# Patient Record
Sex: FEMALE | Race: BLACK OR AFRICAN AMERICAN | NOT HISPANIC OR LATINO | Employment: UNEMPLOYED | URBAN - METROPOLITAN AREA
[De-identification: names, ages, dates, MRNs, and addresses within clinical notes are randomized per-mention and may not be internally consistent; named-entity substitution may affect disease eponyms.]

---

## 2017-01-03 ENCOUNTER — PATIENT OUTREACH (OUTPATIENT)
Dept: ADMINISTRATIVE | Facility: HOSPITAL | Age: 62
End: 2017-01-03

## 2017-01-24 ENCOUNTER — PATIENT OUTREACH (OUTPATIENT)
Dept: ADMINISTRATIVE | Facility: HOSPITAL | Age: 62
End: 2017-01-24

## 2017-01-25 ENCOUNTER — LAB VISIT (OUTPATIENT)
Dept: LAB | Facility: HOSPITAL | Age: 62
End: 2017-01-25
Attending: FAMILY MEDICINE
Payer: COMMERCIAL

## 2017-01-25 ENCOUNTER — OFFICE VISIT (OUTPATIENT)
Dept: FAMILY MEDICINE | Facility: CLINIC | Age: 62
End: 2017-01-25
Payer: COMMERCIAL

## 2017-01-25 VITALS
OXYGEN SATURATION: 97 % | HEART RATE: 55 BPM | HEIGHT: 65 IN | TEMPERATURE: 95 F | DIASTOLIC BLOOD PRESSURE: 90 MMHG | SYSTOLIC BLOOD PRESSURE: 170 MMHG | BODY MASS INDEX: 26.55 KG/M2 | WEIGHT: 159.38 LBS

## 2017-01-25 DIAGNOSIS — I25.10 CORONARY ARTERY DISEASE INVOLVING NATIVE CORONARY ARTERY OF NATIVE HEART WITHOUT ANGINA PECTORIS: ICD-10-CM

## 2017-01-25 DIAGNOSIS — Z23 NEED FOR PROPHYLACTIC VACCINATION OR INOCULATION AGAINST DIPHTHERIA AND TETANUS: ICD-10-CM

## 2017-01-25 DIAGNOSIS — Z00.00 ANNUAL PHYSICAL EXAM: ICD-10-CM

## 2017-01-25 DIAGNOSIS — I10 ESSENTIAL HYPERTENSION: ICD-10-CM

## 2017-01-25 DIAGNOSIS — G47.33 OSA (OBSTRUCTIVE SLEEP APNEA): ICD-10-CM

## 2017-01-25 DIAGNOSIS — Z78.0 POSTMENOPAUSAL: ICD-10-CM

## 2017-01-25 DIAGNOSIS — Z12.11 COLON CANCER SCREENING: ICD-10-CM

## 2017-01-25 DIAGNOSIS — E78.5 HYPERLIPIDEMIA, UNSPECIFIED HYPERLIPIDEMIA TYPE: ICD-10-CM

## 2017-01-25 DIAGNOSIS — E11.9 TYPE 2 DIABETES MELLITUS WITHOUT COMPLICATION: ICD-10-CM

## 2017-01-25 DIAGNOSIS — Z00.00 ANNUAL PHYSICAL EXAM: Primary | ICD-10-CM

## 2017-01-25 DIAGNOSIS — M54.2 NECK PAIN: ICD-10-CM

## 2017-01-25 DIAGNOSIS — E04.2 MULTINODULAR GOITER: ICD-10-CM

## 2017-01-25 DIAGNOSIS — F41.9 ANXIETY: ICD-10-CM

## 2017-01-25 DIAGNOSIS — Z12.31 OTHER SCREENING MAMMOGRAM: ICD-10-CM

## 2017-01-25 DIAGNOSIS — M47.812 CERVICAL ARTHRITIS: ICD-10-CM

## 2017-01-25 DIAGNOSIS — M54.12 CERVICAL RADICULOPATHY: ICD-10-CM

## 2017-01-25 LAB
ALBUMIN SERPL BCP-MCNC: 3.7 G/DL
ALP SERPL-CCNC: 71 U/L
ALT SERPL W/O P-5'-P-CCNC: 17 U/L
ANION GAP SERPL CALC-SCNC: 6 MMOL/L
AST SERPL-CCNC: 22 U/L
BASOPHILS # BLD AUTO: 0.01 K/UL
BASOPHILS NFR BLD: 0.2 %
BILIRUB SERPL-MCNC: 0.7 MG/DL
BUN SERPL-MCNC: 16 MG/DL
CALCIUM SERPL-MCNC: 10.4 MG/DL
CHLORIDE SERPL-SCNC: 105 MMOL/L
CHOLEST/HDLC SERPL: 2.9 {RATIO}
CHOLEST/HDLC SERPL: 2.9 {RATIO}
CO2 SERPL-SCNC: 29 MMOL/L
CREAT SERPL-MCNC: 1 MG/DL
CREAT UR-MCNC: 104 MG/DL
DIFFERENTIAL METHOD: ABNORMAL
EOSINOPHIL # BLD AUTO: 0.1 K/UL
EOSINOPHIL NFR BLD: 2.9 %
ERYTHROCYTE [DISTWIDTH] IN BLOOD BY AUTOMATED COUNT: 12.3 %
EST. GFR  (AFRICAN AMERICAN): >60 ML/MIN/1.73 M^2
EST. GFR  (NON AFRICAN AMERICAN): >60 ML/MIN/1.73 M^2
GLUCOSE SERPL-MCNC: 120 MG/DL
HCT VFR BLD AUTO: 36.6 %
HDL/CHOLESTEROL RATIO: 34.5 %
HDL/CHOLESTEROL RATIO: 34.5 %
HDLC SERPL-MCNC: 171 MG/DL
HDLC SERPL-MCNC: 171 MG/DL
HDLC SERPL-MCNC: 59 MG/DL
HDLC SERPL-MCNC: 59 MG/DL
HGB BLD-MCNC: 12.8 G/DL
LDLC SERPL CALC-MCNC: 100.8 MG/DL
LDLC SERPL CALC-MCNC: 100.8 MG/DL
LYMPHOCYTES # BLD AUTO: 2.1 K/UL
LYMPHOCYTES NFR BLD: 43 %
MCH RBC QN AUTO: 29.9 PG
MCHC RBC AUTO-ENTMCNC: 35 %
MCV RBC AUTO: 86 FL
MONOCYTES # BLD AUTO: 0.3 K/UL
MONOCYTES NFR BLD: 7.1 %
NEUTROPHILS # BLD AUTO: 2.2 K/UL
NEUTROPHILS NFR BLD: 46.6 %
NONHDLC SERPL-MCNC: 112 MG/DL
NONHDLC SERPL-MCNC: 112 MG/DL
PLATELET # BLD AUTO: 371 K/UL
PMV BLD AUTO: 11.4 FL
POTASSIUM SERPL-SCNC: 4.5 MMOL/L
PROT SERPL-MCNC: 7.5 G/DL
PROT UR-MCNC: 11 MG/DL
PROT/CREAT RATIO, UR: 0.11
RBC # BLD AUTO: 4.28 M/UL
SODIUM SERPL-SCNC: 140 MMOL/L
T4 FREE SERPL-MCNC: 1.08 NG/DL
TRIGL SERPL-MCNC: 56 MG/DL
TRIGL SERPL-MCNC: 56 MG/DL
TSH SERPL DL<=0.005 MIU/L-ACNC: 0.39 UIU/ML
WBC # BLD AUTO: 4.81 K/UL

## 2017-01-25 PROCEDURE — 99999 PR PBB SHADOW E&M-EST. PATIENT-LVL IV: CPT | Mod: PBBFAC,,, | Performed by: FAMILY MEDICINE

## 2017-01-25 PROCEDURE — 84156 ASSAY OF PROTEIN URINE: CPT

## 2017-01-25 PROCEDURE — 90715 TDAP VACCINE 7 YRS/> IM: CPT | Mod: S$GLB,,, | Performed by: FAMILY MEDICINE

## 2017-01-25 PROCEDURE — 84443 ASSAY THYROID STIM HORMONE: CPT

## 2017-01-25 PROCEDURE — 83036 HEMOGLOBIN GLYCOSYLATED A1C: CPT

## 2017-01-25 PROCEDURE — 3077F SYST BP >= 140 MM HG: CPT | Mod: S$GLB,,, | Performed by: FAMILY MEDICINE

## 2017-01-25 PROCEDURE — 3080F DIAST BP >= 90 MM HG: CPT | Mod: S$GLB,,, | Performed by: FAMILY MEDICINE

## 2017-01-25 PROCEDURE — 80053 COMPREHEN METABOLIC PANEL: CPT

## 2017-01-25 PROCEDURE — 80061 LIPID PANEL: CPT

## 2017-01-25 PROCEDURE — 84439 ASSAY OF FREE THYROXINE: CPT

## 2017-01-25 PROCEDURE — 36415 COLL VENOUS BLD VENIPUNCTURE: CPT | Mod: PO

## 2017-01-25 PROCEDURE — 85025 COMPLETE CBC W/AUTO DIFF WBC: CPT

## 2017-01-25 PROCEDURE — 99396 PREV VISIT EST AGE 40-64: CPT | Mod: 25,S$GLB,, | Performed by: FAMILY MEDICINE

## 2017-01-25 PROCEDURE — 90471 IMMUNIZATION ADMIN: CPT | Mod: S$GLB,,, | Performed by: FAMILY MEDICINE

## 2017-01-25 RX ORDER — METFORMIN HYDROCHLORIDE 1000 MG/1
1000 TABLET ORAL 2 TIMES DAILY WITH MEALS
Qty: 180 TABLET | Refills: 1 | Status: SHIPPED | OUTPATIENT
Start: 2017-01-25 | End: 2017-07-26

## 2017-01-25 RX ORDER — SERTRALINE HYDROCHLORIDE 50 MG/1
50 TABLET, FILM COATED ORAL DAILY
Qty: 90 TABLET | Refills: 1 | Status: SHIPPED | OUTPATIENT
Start: 2017-01-25 | End: 2019-09-17 | Stop reason: SDUPTHER

## 2017-01-25 RX ORDER — BENAZEPRIL HYDROCHLORIDE 40 MG/1
40 TABLET ORAL DAILY
COMMUNITY
End: 2017-07-26 | Stop reason: SDUPTHER

## 2017-01-25 RX ORDER — HYDROCORTISONE ACETATE 25 MG/1
25 SUPPOSITORY RECTAL 2 TIMES DAILY PRN
Qty: 24 SUPPOSITORY | Refills: 0 | Status: SHIPPED | OUTPATIENT
Start: 2017-01-25 | End: 2017-02-04

## 2017-01-25 RX ORDER — CYCLOBENZAPRINE HCL 10 MG
10 TABLET ORAL NIGHTLY
Qty: 30 TABLET | Refills: 2 | Status: SHIPPED | OUTPATIENT
Start: 2017-01-25 | End: 2020-12-18

## 2017-01-25 NOTE — MR AVS SNAPSHOT
[facility]  [address] [address]  Phone: [phone]                  Lydia Danielson   2017 9:00 AM   Office Visit    Description:  Female : 1955   Provider:  Chelsea Bowen MD   Department:  [facility]           Reason for Visit     Annual Exam           Diagnoses this Visit        Comments    Annual physical exam    -  Primary     Essential hypertension         Type II or unspecified type diabetes mellitus without mention of complication, uncontrolled         Hyperlipidemia, unspecified hyperlipidemia type         Multinodular goiter         Coronary artery disease involving native coronary artery of native heart without angina pectoris         NATASHA (obstructive sleep apnea)         Neck pain         Cervical arthritis         Cervical radiculopathy         Anxiety         Postmenopausal         Need for prophylactic vaccination or inoculation against diphtheria and tetanus         Colon cancer screening         Other screening mammogram                To Do List           Future Appointments        Provider Department Dept Phone    2017 10:40 AM LABORATORY, [address] [facility] [phone]    2017 8:45 AM [facility] MRI [facility] [phone]    2/10/2017 8:15 AM Jatin Montez MD [facility] - Cardiology [phone]    3/6/2017 9:30 AM [facility] [id number]-SCR [facility] [phone]    2017 10:45 AM Chelsea Bowen MD [facility] [phone]      Goals (5 Years of Data)     None      Follow-Up and Disposition     Return in about 3 months (around 2017) for diabetes and blood pressure follow up.       These Medications        Disp Refills Start End    cyclobenzaprine (FLEXERIL) 10 MG tablet 30 tablet 2 2017     Take 1 tablet (10 mg total) by mouth nightly. - Oral    Pharmacy: [id number] - [place], [place] - [address] AT [facility] Ph #: [phone]       sertraline (ZOLOFT) 50 MG tablet 90 tablet 1 1/25/2017 1/25/2018    Take 1 tablet (50 mg total) by mouth once daily. For anxiety - Oral    Pharmacy: [facility] [address] [place] - [address] AT [facility] Ph #: [phone]       metformin (GLUCOPHAGE) 1000 MG tablet 180 tablet 1 1/25/2017     Take 1 tablet (1,000 mg total) by mouth 2 (two) times daily with meals. - Oral    Pharmacy: [facility] [address] [place] - [address] [facility] Ph #: [phone]       hydrocortisone (ANUSOL-HC) 25 mg suppository 24 suppository 0 1/25/2017 2/4/2017    Place 1 suppository (25 mg total) rectally 2 (two) times daily as needed for Hemorrhoids. - Rectal    Pharmacy: [facility] [address] [place] - [address]  AT [facility] Ph #: [phone]         [facility]     [facility] On Call Nurse Care Line - 24/7 Assistance  Registered nurses in the [facility] provide clinical advisement, health education, appointment booking, and other advisory services.  Call for this free service at [phone].             Medications           Message regarding Medications     Verify the changes and/or additions to your medication regime listed below are the same as discussed with your clinician today.  If any of these changes or additions are incorrect, please notify your healthcare provider.        START taking these NEW medications        Refills    sertraline (ZOLOFT) 50 MG tablet 1    Sig: Take 1 tablet (50 mg total) by mouth once daily. For anxiety    Class: Normal    Route: Oral    hydrocortisone (ANUSOL-HC) 25 mg suppository 0    Sig: Place 1 suppository (25 mg total) rectally 2 (two) times daily as needed for Hemorrhoids.    Class: Normal    Route: Rectal      CHANGE how you are taking these medications     Start Taking Instead of    metformin (GLUCOPHAGE) 1000 MG tablet metformin  "(GLUCOPHAGE) 1000 MG tablet    Dosage:  Take 1 tablet (1,000 mg total) by mouth 2 (two) times daily with meals. Dosage:  TAKE 1 TABLET BY MOUTH TWICE DAILY WITH MEALS    Reason for Change:  Reorder       STOP taking these medications     lisinopril (PRINIVIL,ZESTRIL) 20 MG tablet Take 20 mg by mouth 2 (two) times daily.           Verify that the below list of medications is an accurate representation of the medications you are currently taking.  If none reported, the list may be blank. If incorrect, please contact your healthcare provider. Carry this list with you in case of emergency.           Current Medications     amlodipine (NORVASC) 10 MG tablet Take 1 tablet (10 mg total) by mouth once daily.    atorvastatin (LIPITOR) 40 MG tablet TAKE 1 TABLET BY MOUTH EVERY NIGHT AT BEDTIME    benazepril (LOTENSIN) 40 MG tablet Take 40 mg by mouth once daily.    cyclobenzaprine (FLEXERIL) 10 MG tablet Take 1 tablet (10 mg total) by mouth nightly.    fluticasone (FLONASE) 50 mcg/actuation nasal spray 2 Spray, Suspension Nasal Every day.  each nostril    furosemide (LASIX) 40 MG tablet TAKE 1 TABLET BY MOUTH EVERY DAY    glimepiride (AMARYL) 4 MG tablet TAKE 1 TABLET BY MOUTH TWICE DAILY    ibuprofen (ADVIL,MOTRIN) 800 MG tablet Take by mouth. 1 Tablet Oral Twice a day .  with food    insulin glargine (TOUJEO SOLOSTAR) 300 unit/mL (1.5 mL) InPn Inject 10 Units into the skin every evening.    insulin needles, disposable, 32 x 5/32 " Ndle Use daily with Lantus    metoprolol succinate (TOPROL-XL) 25 MG 24 hr tablet Take 25 mg by mouth once daily.    saxagliptin (ONGLYZA) 5 mg Tab tablet Take by mouth. 1 Tablet Oral Every day    WELCHOL 3.75 gram PwPk TAKE 1 PACKET BY MOUTH EVERY DAY AS DIRECTED    hydrocortisone (ANUSOL-HC) 25 mg suppository Place 1 suppository (25 mg total) rectally 2 (two) times daily as needed for Hemorrhoids.    metformin (GLUCOPHAGE) 1000 MG tablet Take 1 tablet (1,000 mg total) by mouth 2 (two) times " "daily with meals.    sertraline (ZOLOFT) 50 MG tablet Take 1 tablet (50 mg total) by mouth once daily. For anxiety           Clinical Reference Information           Vital Signs - Last Recorded  Most recent update: 1/25/2017  9:12 AM by Geovany Bacon LPN    BP Pulse Temp Ht Wt SpO2    (!) 170/90 (!) 55 (!) 95.4 °F (35.2 °C) 5' 5" (1.651 m) 72.3 kg (159 lb 6.3 oz) 97%    BMI                26.52 kg/m2          Blood Pressure          Most Recent Value    BP  (!)  170/90      Allergies as of 1/25/2017     No Known Allergies      Immunizations Administered on Date of Encounter - 1/25/2017     Name Date Dose VIS Date Route    TDAP 1/25/2017 0.5 mL 2/24/2015 Intramuscular    influenza - Quadrivalent 1/25/2017 0.5 mL 8/7/2015 Intramuscular    influenza - Quadrivalent 1/25/2017 0.5 mL 8/7/2015 Intramuscular      Orders Placed During Today's Visit      Normal Orders This Visit    Ambulatory referral to Cardiology     Case request GI: COLONOSCOPY     Influenza - Quadrivalent     Influenza - Quadrivalent     Protein / creatinine ratio, urine     Tdap Vaccine (Adult)     Future Labs/Procedures Expected by Expires    CBC auto differential  1/25/2017 3/26/2018    Comprehensive metabolic panel  1/25/2017 3/26/2018    Hemoglobin A1c  1/25/2017 3/26/2018    Lipid panel  1/25/2017 3/26/2018    Mammo Digital Screening Bilat with CAD  1/25/2017 3/27/2018    MRI Cervical Spine W WO Cont  1/25/2017 1/25/2018    TSH  1/25/2017 3/26/2018      Instructions    Please call Dr. Bowen for your results.    Thanks.       "

## 2017-01-25 NOTE — PROGRESS NOTES
"HISTORY OF PRESENT ILLNESS: Ms. Danielson comes in today fasting and without taking medication and without acute problems for annual wellness examination.  She states she feels her blood pressure is elevated today as she does not drive and her transportation was changed at the last minute this morning.      END OF LIFE DECISION: She has no living will and does not desire life support.    Current Outpatient Prescriptions   Medication Sig    amlodipine (NORVASC) 10 MG tablet Take 1 tablet (10 mg total) by mouth once daily.    atorvastatin (LIPITOR) 40 MG tablet TAKE 1 TABLET BY MOUTH EVERY NIGHT AT BEDTIME    benazepril (LOTENSIN) 40 MG tablet Take 40 mg by mouth once daily.    cyclobenzaprine (FLEXERIL) 10 MG tablet Take 1 tablet (10 mg total) by mouth nightly.    fluticasone (FLONASE) 50 mcg/actuation nasal spray 2 Spray, Suspension Nasal Every day.  each nostril    furosemide (LASIX) 40 MG tablet TAKE 1 TABLET BY MOUTH EVERY DAY    glimepiride (AMARYL) 4 MG tablet TAKE 1 TABLET BY MOUTH TWICE DAILY    ibuprofen (ADVIL,MOTRIN) 800 MG tablet Take by mouth. 1 Tablet Oral Twice a day .  with food    insulin glargine (TOUJEO SOLOSTAR) 300 unit/mL (1.5 mL) InPn Inject 10 Units into the skin every evening.    insulin needles, disposable, 32 x 5/32 " Ndle Use daily with Lantus    metoprolol succinate (TOPROL-XL) 25 MG 24 hr tablet Take 25 mg by mouth once daily.    saxagliptin (ONGLYZA) 5 mg Tab tablet Take by mouth. 1 Tablet Oral Every day    WELCHOL 3.75 gram PwPk TAKE 1 PACKET BY MOUTH EVERY DAY AS DIRECTED    metformin (GLUCOPHAGE) 1000 MG tablet TAKE 1 TABLET BY MOUTH TWICE DAILY WITH MEALS (currently not taking as reports no refills for some time)       SCREENINGS:   Cholesterol: January 7, 2016.  FFS/Colonoscopy: September 26, 2006; repeat in 8 years. Poor prep on January 28, 2015; patient desires to proceed at this time.  Mammogram: March 2, 2016 - benign.   GYN Exam: January 7, 2016 - okay.   Dexa " Scan: August 30, 2013 - okay; repeat 5 - 7 years.  Eye Exam: Summer 2016 at [facility] per patient. She wears glasses.  Dental Exam: 2016 per patient. Due.  PPD: Negative in the past.  Immunizations: Td/Tdap - September 14, 2006. She desires.  Gardasil - N./A.  Zostavax - January 7, 2016.  Pneumovax - August 14, 2006.  Seasonal Flu - January 7, 2016. She desires.  Hepatitis B vaccines - in the past per patient.    ROS:  GENERAL: Denies fever, chills, fatigue or unusual weight change. Appetite normal. Weight 69.8 kg (153 lb 14.1 oz) at July 28, 2016 visit. Reports not exercising. Monitors diet.  SKIN: Denies rashes, itching, changes in mole, color or texture of skin or easy bruising.  HEAD: Denies recent head trauma or recent headaches.  EYES: Denies change in vision, pain, diplopia, redness or discharge. Wears glasses.  EARS: Denies ear pain, discharge, vertigo or decreased hearing.  NOSE: Denies loss of smell, epistaxis or rhinitis.  MOUTH & THROAT: Denies hoarseness or change in voice. Denies excessive gum bleeding or mouth sores. Denies sore throat.  NODES: Denies swollen glands.  CHEST: Denies HOYT, wheezing, cough, or sputum production. Reports does uses CPAP prn. Scheduled to see Dr. Simon, pulmonologist, on February 5, 2015 for NATASHA surveillance. Reports does not desire to follow up at this time.  CARDIOVASCULAR: Denies chest pain, PND, orthopnea or reduced exercise tolerance. Denies palpitations. Saw Dr. Montez, cardiologist, on January 7, 2015 for CAD, hypertension surveillance.   ABDOMEN: Denies diarrhea, nausea, vomiting, or blood in stool except reports occasional abdomen pain, constipation and occasionally take Dulcolax with help.  Also, reports occasional painful hemorrhoids with straining.  URINARY: Denies flank pain, urine frequency, dysuria or hematuria.  GENITOURINARY: Denies flank pain, dysuria, vaginal discharge or hematuria. Performs monthly breast self-exams.  HEME/LYMPH: Denies bleeding  "problems.  ENDOCRINE:Reports checks glucose levels twice daily with levels ranging high lately 200's - 300's (rarely 100's).  Reports has not taken metformin in 6 to 7 months due to no refills.  Reports saw endocrinologist Dr. Harsh Almaraz Spring 2013 for evaluation of multinodular goiter, slight abnormal TSH level and reports likely would need "radiation pill" for treatment.  PERIPHERAL VASCULAR:Denies claudication or cyanosis.  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  Reports some times takes Advil with help. Requests refill of Flexeril.  NEUROLOGIC: Denies history of seizures, tremors, paralysis, alteration of gait or coordination. Reports occasional numbness at right hand.  PSYCHIATRIC: Denies mood swings, depression, homicidal or suicidal thoughts. Reports rare sleep problems (insomnia) and reports anxiety mostly every day.    PE:   VS:   Visit Vitals    BP (!) 170/90    Pulse (!) 55    Temp (!) 95.4 °F (35.2 °C)    Ht 5' 5" (1.651 m)    Wt 72.3 kg (159 lb 6.3 oz)    SpO2 97%    BMI 26.52 kg/m2     APPEARANCE: Well nourished, well developed female, pleasant and overweight, alert and oriented in no acute distress.   HEAD: Non tender. Full range of motion.  EYES: PERRL, conjunctiva pink, lids no edema. She wears glasses.  EARS: External canal patent, no swelling or redness. TM's shiny and clear.  NOSE: Mucosa and turbinates pink, not swollen. No discharge. Non tender sinuses.  THROAT: No pharyngeal erythema or exudate. No stridor.   NECK: Supple, no mass except chronic, slight non tender goiter noted.  NODES: No cervical, axillary lymph node enlargement.  CHEST: Normal respiratory effort. Lungs clear to auscultation.  CARDIOVASCULAR: Normal S1, S2. No rubs, murmurs or gallops. PMI not displaced. No carotid bruit. Pedal pulses palpable bilaterally. No edema. Feet " look okay without ulcerations or skin breaks.  ABDOMEN: Bowel sounds present. Not distended. Soft. No tenderness, masses or organomegaly.  BREAST EXAM: Symmetrical, no external lesions, no discharge, no masses palpated.  PELVIC EXAM: Not examined as patient has had IKE/BSO due to noncancerous reasons.  RECTAL EXAM: No external hemorrhoids or anal fissures; however, non-inflamed hemorrhoidal tag noted. Heme-negative stool in the rectal vault.  MUSCULOSKELETAL: No joint deformities or stiffness. Slightly tender posterior neck with full range of motion noted. She is ambulatory without problems.  SKIN: No rashes or suspicious lesions, normal color and turgor except benign-appearing skin tag mid back and keloids at anterior chest and abdomen and umbilicus.  NEUROLOGIC: Cranial Nerves: II-XII grossly intact. DTR's: Knees, Ankles 2+ and equal bilaterally. Monofilament test unremarkable. Gait & Posture: Normal gait and fine motion.  PSYCHIATRIC: Patient alert, oriented x 3. Mood/Affect normal without acute anxiety or depression noted. Judgment/insight good as she makes appropriate decisions on today's examination.    Protective Sensation (w/ 10 gram monofilament):  Right: Intact  Left: Intact    Visual Inspection:  Normal -  Bilateral    Pedal Pulses:   Right: Present  Left: Present    Posterior tibialis:   Right:Present  Left: Present    Lab Results   Component Value Date    HGBA1C 8.7 (H) 01/07/2016       ASSESSMENT:    ICD-10-CM ICD-9-CM    1. Annual physical exam Z00.00 V70.0 Comprehensive metabolic panel      Protein / creatinine ratio, urine      Lipid panel      CBC auto differential      TSH      Hemoglobin A1c   2. Essential hypertension I10 401.9    3. Type II or unspecified type diabetes mellitus without mention of complication, uncontrolled E11.65 250.02    4. Hyperlipidemia, unspecified hyperlipidemia type E78.5 272.4    5. Multinodular goiter E04.2 241.1    6. Coronary artery disease involving native coronary  artery of native heart without angina pectoris I25.10 414.01 Ambulatory referral to Cardiology   7. NATASHA (obstructive sleep apnea) G47.33 327.23    8. Neck pain M54.2 723.1 MRI Cervical Spine W WO Cont   9. Cervical arthritis M46.92 721.0 MRI Cervical Spine W WO Cont   10. Cervical radiculopathy M54.12 723.4 MRI Cervical Spine W WO Cont   11. Anxiety F41.9 300.00    12. Postmenopausal Z78.0 V49.81    13. Need for prophylactic vaccination or inoculation against diphtheria and tetanus Z23 V06.5 Tdap Vaccine (Adult)   14. Colon cancer screening Z12.11 V76.51 Case request GI: COLONOSCOPY   15. Other screening mammogram Z12.31 V76.12 Mammo Digital Screening Bilat with CAD        PLAN:  1. Age-appropriate counseling-appropriate low-sodium, low-cholesterol, low carbohydrate diet and exercise daily as tolerated, monthly breast self-exam, annual wellness examination.  2. Patient advised to call for results.  3. Encouraged patient to follow up with cardiology (appointment made) and endocrinology for surveillance of heart and thyroid disease, respectively. Patient states she will make her own appointment with endocrinology.  4. Continue current medications.  5. Annual eye and dental exams encouraged.  6. See me in 3 months for diabetes and hypertension follow up.  7. Prescription refill - Flexeril 10 mg nightly prn pain, #30, 2 refills; Metformin 1000 mg twice daily, #180, 1 refill.  8. Start Zoloft 50 mg daily for anxiety #90, 1 refill.  9.  Anusol-HC suppository - 1 per rectum twice daily prn, #24, 0 refill.  10. Flu shot today.

## 2017-01-26 LAB
ESTIMATED AVG GLUCOSE: 235 MG/DL
ESTIMATED AVG GLUCOSE: 235 MG/DL
HBA1C MFR BLD HPLC: 9.8 %
HBA1C MFR BLD HPLC: 9.8 %

## 2017-01-27 RX ORDER — SODIUM, POTASSIUM,MAG SULFATES 17.5-3.13G
SOLUTION, RECONSTITUTED, ORAL ORAL
Qty: 354 ML | Refills: 0 | Status: SHIPPED | OUTPATIENT
Start: 2017-01-27 | End: 2017-03-17

## 2017-01-30 ENCOUNTER — TELEPHONE (OUTPATIENT)
Dept: FAMILY MEDICINE | Facility: CLINIC | Age: 62
End: 2017-01-30

## 2017-01-30 NOTE — TELEPHONE ENCOUNTER
----- Message from Lea Cole sent at 1/30/2017  8:40 AM CST -----  Contact: pt  Pt calling for her results of lab test done on 1/25,please...[phone] (owtw)

## 2017-02-03 ENCOUNTER — TELEPHONE (OUTPATIENT)
Dept: RADIOLOGY | Facility: HOSPITAL | Age: 62
End: 2017-02-03

## 2017-02-06 ENCOUNTER — TELEPHONE (OUTPATIENT)
Dept: FAMILY MEDICINE | Facility: CLINIC | Age: 62
End: 2017-02-06

## 2017-02-06 ENCOUNTER — HOSPITAL ENCOUNTER (OUTPATIENT)
Dept: RADIOLOGY | Facility: HOSPITAL | Age: 62
Discharge: HOME | End: 2017-02-06
Attending: FAMILY MEDICINE
Payer: COMMERCIAL

## 2017-02-06 DIAGNOSIS — M47.812 CERVICAL ARTHRITIS: ICD-10-CM

## 2017-02-06 DIAGNOSIS — E04.2 MULTINODULAR GOITER: Primary | Chronic | ICD-10-CM

## 2017-02-06 DIAGNOSIS — M54.12 CERVICAL RADICULOPATHY: ICD-10-CM

## 2017-02-06 DIAGNOSIS — M54.2 NECK PAIN: ICD-10-CM

## 2017-02-06 DIAGNOSIS — R79.89 ABNORMAL TSH: ICD-10-CM

## 2017-02-06 RX ORDER — DIAZEPAM 2 MG/1
2 TABLET ORAL ONCE AS NEEDED
Qty: 1 TABLET | Refills: 0 | Status: SHIPPED | OUTPATIENT
Start: 2017-02-06 | End: 2020-12-18

## 2017-02-06 NOTE — TELEPHONE ENCOUNTER
----- Message from Talita Amezquita sent at 2/6/2017  1:40 PM CST -----  Contact: Pt   Pt called and stated she needed to the nurse. She stated she needs a referral to the thyroid specialist. She can be reached at [phone].    Thanks,  Tf

## 2017-02-06 NOTE — TELEPHONE ENCOUNTER
----- Message from Anastasiya Holden sent at 2/6/2017  2:15 PM CST -----  Regarding: MRI patient  Patient is claustrophobic and wants a mild sedation. She is rescheduled for 2/15. Thanks.

## 2017-02-10 ENCOUNTER — OFFICE VISIT (OUTPATIENT)
Dept: CARDIOLOGY | Facility: CLINIC | Age: 62
End: 2017-02-10
Payer: COMMERCIAL

## 2017-02-10 ENCOUNTER — CLINICAL SUPPORT (OUTPATIENT)
Dept: CARDIOLOGY | Facility: CLINIC | Age: 62
End: 2017-02-10
Payer: COMMERCIAL

## 2017-02-10 VITALS
HEIGHT: 65 IN | WEIGHT: 152 LBS | BODY MASS INDEX: 25.33 KG/M2 | HEART RATE: 58 BPM | DIASTOLIC BLOOD PRESSURE: 80 MMHG | SYSTOLIC BLOOD PRESSURE: 194 MMHG

## 2017-02-10 DIAGNOSIS — G47.33 OSA (OBSTRUCTIVE SLEEP APNEA): ICD-10-CM

## 2017-02-10 DIAGNOSIS — E04.2 MULTINODULAR GOITER: Chronic | ICD-10-CM

## 2017-02-10 DIAGNOSIS — I25.10 CORONARY ARTERY DISEASE, ANGINA PRESENCE UNSPECIFIED, UNSPECIFIED VESSEL OR LESION TYPE, UNSPECIFIED WHETHER NATIVE OR TRANSPLANTED HEART: ICD-10-CM

## 2017-02-10 DIAGNOSIS — E78.5 HYPERLIPIDEMIA, UNSPECIFIED HYPERLIPIDEMIA TYPE: Chronic | ICD-10-CM

## 2017-02-10 DIAGNOSIS — I10 ESSENTIAL HYPERTENSION: Primary | Chronic | ICD-10-CM

## 2017-02-10 PROCEDURE — 4010F ACE/ARB THERAPY RXD/TAKEN: CPT | Mod: S$GLB,,, | Performed by: INTERNAL MEDICINE

## 2017-02-10 PROCEDURE — 2022F DILAT RTA XM EVC RTNOPTHY: CPT | Mod: S$GLB,,, | Performed by: INTERNAL MEDICINE

## 2017-02-10 PROCEDURE — 3077F SYST BP >= 140 MM HG: CPT | Mod: S$GLB,,, | Performed by: INTERNAL MEDICINE

## 2017-02-10 PROCEDURE — 93000 ELECTROCARDIOGRAM COMPLETE: CPT | Mod: S$GLB,,, | Performed by: INTERNAL MEDICINE

## 2017-02-10 PROCEDURE — 3079F DIAST BP 80-89 MM HG: CPT | Mod: S$GLB,,, | Performed by: INTERNAL MEDICINE

## 2017-02-10 PROCEDURE — 99213 OFFICE O/P EST LOW 20 MIN: CPT | Mod: S$GLB,,, | Performed by: INTERNAL MEDICINE

## 2017-02-10 PROCEDURE — 3046F HEMOGLOBIN A1C LEVEL >9.0%: CPT | Mod: S$GLB,,, | Performed by: INTERNAL MEDICINE

## 2017-02-10 PROCEDURE — 99999 PR PBB SHADOW E&M-EST. PATIENT-LVL III: CPT | Mod: PBBFAC,,, | Performed by: INTERNAL MEDICINE

## 2017-02-10 RX ORDER — HYDROCHLOROTHIAZIDE 25 MG/1
25 TABLET ORAL DAILY
Qty: 30 TABLET | Refills: 11 | Status: SHIPPED | OUTPATIENT
Start: 2017-02-10 | End: 2017-03-29 | Stop reason: ALTCHOICE

## 2017-02-10 NOTE — PROGRESS NOTES
Subjective:   Patient ID:  Lydia Danielson is a 61 y.o. female who presents for follow up of No chief complaint on file.      HPI  A 60 yo female with h/o htn diabetes  hlp is here for f/u. Her diabetes is not well controlled fluctuates a lot she claims compliance with salt.her blood pressure is always elevated at the doctor office she has it under control at home with her machine which ahs not been checked. She ahs no other complaints of ehadaches. Has no shortness of breath or chest pain. No legs welling no orthopnea has nocturia. No palpitation syncope near syncope.   Past Medical History   Diagnosis Date    Allergic rhinitis, cause unspecified     CAD (coronary artery disease)      S/P MI in 2002; followed by Dr. Blevins, cardiologist    Diabetes mellitus, type 2     External hemorrhoid     GERD (gastroesophageal reflux disease)     Hyperlipidemia     Hypertension     Keloid     Multinodular goiter     Neck arthritis 07/28/2016    NATASHA (obstructive sleep apnea)     Postmenopausal      No history of abnormal pap smear       Past Surgical History   Procedure Laterality Date    Left breast lumpectomy       NO cancer    Tlh/bso       Due to noncancerous reasons    Appendectomy  2011    Cardiac catheterization         Social History   Substance Use Topics    Smoking status: Former Smoker     Packs/day: 0.10     Years: 0.20     Types: Cigarettes     Quit date: 7/8/1993    Smokeless tobacco: Never Used    Alcohol use No       Family History   Problem Relation Age of Onset    Arthritis Mother     Lupus Sister     Diabetes Brother     Hypertension Brother     Diabetes Paternal Aunt     Cancer Cousin     Cancer Cousin     Heart disease Sister     Diabetes Sister     Hypertension Sister     Diabetes Sister     Hypertension Sister     Diabetes Brother     Hypertension Brother     Cancer Brother      Pancreatic and lung cancer (Smoker)    Hypertension Son     Stroke Neg Hx        Current  "Outpatient Prescriptions   Medication Sig    amlodipine (NORVASC) 10 MG tablet Take 1 tablet (10 mg total) by mouth once daily.    atorvastatin (LIPITOR) 40 MG tablet TAKE 1 TABLET BY MOUTH EVERY NIGHT AT BEDTIME    benazepril (LOTENSIN) 40 MG tablet Take 40 mg by mouth once daily.    cyclobenzaprine (FLEXERIL) 10 MG tablet Take 1 tablet (10 mg total) by mouth nightly.    diazePAM (VALIUM) 2 MG tablet Take 1 tablet (2 mg total) by mouth once as needed for Anxiety (30 minutes prior to procedure).    fluticasone (FLONASE) 50 mcg/actuation nasal spray 2 Spray, Suspension Nasal Every day.  each nostril    furosemide (LASIX) 40 MG tablet TAKE 1 TABLET BY MOUTH EVERY DAY    glimepiride (AMARYL) 4 MG tablet TAKE 1 TABLET BY MOUTH TWICE DAILY    ibuprofen (ADVIL,MOTRIN) 800 MG tablet Take by mouth. 1 Tablet Oral Twice a day .  with food    insulin glargine (TOUJEO SOLOSTAR) 300 unit/mL (1.5 mL) InPn Inject 10 Units into the skin every evening.    insulin needles, disposable, 32 x 5/32 " Ndle Use daily with Lantus    metformin (GLUCOPHAGE) 1000 MG tablet Take 1 tablet (1,000 mg total) by mouth 2 (two) times daily with meals.    metoprolol succinate (TOPROL-XL) 25 MG 24 hr tablet Take 25 mg by mouth once daily.    saxagliptin (ONGLYZA) 5 mg Tab tablet Take by mouth. 1 Tablet Oral Every day    sertraline (ZOLOFT) 50 MG tablet Take 1 tablet (50 mg total) by mouth once daily. For anxiety    sodium,potassium,mag sulfates (SUPREP BOWEL PREP KIT) 17.5-3.13-1.6 gram SolR As directed    WELCHOL 3.75 gram PwPk TAKE 1 PACKET BY MOUTH EVERY DAY AS DIRECTED     No current facility-administered medications for this visit.      Current Outpatient Prescriptions on File Prior to Visit   Medication Sig    amlodipine (NORVASC) 10 MG tablet Take 1 tablet (10 mg total) by mouth once daily.    atorvastatin (LIPITOR) 40 MG tablet TAKE 1 TABLET BY MOUTH EVERY NIGHT AT BEDTIME    benazepril (LOTENSIN) 40 MG tablet Take 40 mg by " "mouth once daily.    cyclobenzaprine (FLEXERIL) 10 MG tablet Take 1 tablet (10 mg total) by mouth nightly.    diazePAM (VALIUM) 2 MG tablet Take 1 tablet (2 mg total) by mouth once as needed for Anxiety (30 minutes prior to procedure).    fluticasone (FLONASE) 50 mcg/actuation nasal spray 2 Spray, Suspension Nasal Every day.  each nostril    furosemide (LASIX) 40 MG tablet TAKE 1 TABLET BY MOUTH EVERY DAY    glimepiride (AMARYL) 4 MG tablet TAKE 1 TABLET BY MOUTH TWICE DAILY    ibuprofen (ADVIL,MOTRIN) 800 MG tablet Take by mouth. 1 Tablet Oral Twice a day .  with food    insulin glargine (TOUJEO SOLOSTAR) 300 unit/mL (1.5 mL) InPn Inject 10 Units into the skin every evening.    insulin needles, disposable, 32 x 5/32 " Ndle Use daily with Lantus    metformin (GLUCOPHAGE) 1000 MG tablet Take 1 tablet (1,000 mg total) by mouth 2 (two) times daily with meals.    metoprolol succinate (TOPROL-XL) 25 MG 24 hr tablet Take 25 mg by mouth once daily.    saxagliptin (ONGLYZA) 5 mg Tab tablet Take by mouth. 1 Tablet Oral Every day    sertraline (ZOLOFT) 50 MG tablet Take 1 tablet (50 mg total) by mouth once daily. For anxiety    sodium,potassium,mag sulfates (SUPREP BOWEL PREP KIT) 17.5-3.13-1.6 gram SolR As directed    WELCHOL 3.75 gram PwPk TAKE 1 PACKET BY MOUTH EVERY DAY AS DIRECTED     No current facility-administered medications on file prior to visit.        Review of Systems   Constitution: Negative for diaphoresis, weakness, malaise/fatigue and weight gain.   HENT: Negative for headaches and hoarse voice.    Eyes: Negative for double vision and visual disturbance.   Cardiovascular: Negative for chest pain, claudication, cyanosis, dyspnea on exertion, irregular heartbeat, leg swelling, near-syncope, orthopnea, palpitations, paroxysmal nocturnal dyspnea and syncope.   Respiratory: Negative for cough, hemoptysis, shortness of breath and snoring.    Hematologic/Lymphatic: Negative for bleeding problem. Does " not bruise/bleed easily.   Skin: Negative for color change and poor wound healing.   Musculoskeletal: Negative for muscle cramps, muscle weakness and myalgias.   Gastrointestinal: Negative for bloating, abdominal pain, change in bowel habit, diarrhea, heartburn, hematemesis, hematochezia, melena and nausea.   Genitourinary: Positive for nocturia.   Neurological: Negative for excessive daytime sleepiness, dizziness, light-headedness, loss of balance and numbness.   Psychiatric/Behavioral: Negative for memory loss. The patient does not have insomnia.    Allergic/Immunologic: Negative for hives.       Objective:   Physical Exam   Constitutional: She is oriented to person, place, and time. She appears well-developed and well-nourished. She does not appear ill. No distress.   HENT:   Head: Normocephalic and atraumatic.   Eyes: EOM are normal. Pupils are equal, round, and reactive to light. No scleral icterus.   Neck: Normal range of motion. Neck supple. Normal carotid pulses, no hepatojugular reflux and no JVD present. Carotid bruit is not present. No tracheal deviation present. No thyromegaly present.   Cardiovascular: Normal rate, regular rhythm, normal heart sounds and normal pulses.  Exam reveals no gallop and no friction rub.    No murmur heard.  Pulmonary/Chest: Effort normal and breath sounds normal. No respiratory distress. She has no wheezes. She has no rhonchi. She has no rales. She exhibits no tenderness.   Abdominal: Soft. Normal appearance, normal aorta and bowel sounds are normal. She exhibits no abdominal bruit, no ascites and no pulsatile midline mass. There is no hepatomegaly. There is no tenderness.   Musculoskeletal: She exhibits no edema.        Right shoulder: She exhibits no deformity.   Neurological: She is alert and oriented to person, place, and time. She has normal strength. No cranial nerve deficit. Coordination normal.   Skin: Skin is warm and dry. No rash noted. She is not diaphoretic. No  "cyanosis or erythema. Nails show no clubbing.   Psychiatric: She has a normal mood and affect. Her speech is normal and behavior is normal.   Nursing note and vitals reviewed.    Vitals:    02/10/17 0813   BP: (!) 194/80   BP Location: Right arm   Patient Position: Sitting   BP Method: Manual   Pulse: (!) 58   Weight: 68.9 kg (152 lb)   Height: 5' 5" (1.651 m)     Lab Results   Component Value Date    CHOL 171 01/25/2017    CHOL 171 01/25/2017    CHOL 184 01/07/2016     Lab Results   Component Value Date    HDL 59 01/25/2017    HDL 59 01/25/2017    HDL 61 01/07/2016     Lab Results   Component Value Date    LDLCALC 100.8 01/25/2017    LDLCALC 100.8 01/25/2017    LDLCALC 110.0 01/07/2016     Lab Results   Component Value Date    TRIG 56 01/25/2017    TRIG 56 01/25/2017    TRIG 65 01/07/2016     Lab Results   Component Value Date    CHOLHDL 34.5 01/25/2017    CHOLHDL 34.5 01/25/2017    CHOLHDL 33.2 01/07/2016       Chemistry        Component Value Date/Time     01/25/2017 1035    K 4.5 01/25/2017 1035     01/25/2017 1035    CO2 29 01/25/2017 1035    BUN 16 01/25/2017 1035    CREATININE 1.0 01/25/2017 1035     (H) 01/25/2017 1035        Component Value Date/Time    CALCIUM 10.4 01/25/2017 1035    ALKPHOS 71 01/25/2017 1035    AST 22 01/25/2017 1035    ALT 17 01/25/2017 1035    BILITOT 0.7 01/25/2017 1035          Lab Results   Component Value Date    TSH 0.387 (L) 01/25/2017     No results found for: INR, PROTIME  Lab Results   Component Value Date    WBC 4.81 01/25/2017    HGB 12.8 01/25/2017    HCT 36.6 (L) 01/25/2017    MCV 86 01/25/2017     (H) 01/25/2017     BMP  Sodium   Date Value Ref Range Status   01/25/2017 140 136 - 145 mmol/L Final     Potassium   Date Value Ref Range Status   01/25/2017 4.5 3.5 - 5.1 mmol/L Final     Chloride   Date Value Ref Range Status   01/25/2017 105 95 - 110 mmol/L Final     CO2   Date Value Ref Range Status   01/25/2017 29 23 - 29 mmol/L Final     BUN, Bld "   Date Value Ref Range Status   01/25/2017 16 8 - 23 mg/dL Final     Creatinine   Date Value Ref Range Status   01/25/2017 1.0 0.5 - 1.4 mg/dL Final     Calcium   Date Value Ref Range Status   01/25/2017 10.4 8.7 - 10.5 mg/dL Final     Anion Gap   Date Value Ref Range Status   01/25/2017 6 (L) 8 - 16 mmol/L Final     eGFR if    Date Value Ref Range Status   01/25/2017 >60.0 >60 mL/min/1.73 m^2 Final     eGFR if non    Date Value Ref Range Status   01/25/2017 >60.0 >60 mL/min/1.73 m^2 Final     Comment:     Calculation used to obtain the estimated glomerular filtration  rate (eGFR) is the CKD-EPI equation. Since race is unknown   in our information system, the eGFR values for   -American and Non--American patients are given   for each creatinine result.       CrCl cannot be calculated (Patient has no serum creatinine result on file.).    Assessment:     1. Essential hypertension    2. Coronary artery disease, angina presence unspecified, unspecified vessel or lesion type, unspecified whether native or transplanted heart    3. NATASHA (obstructive sleep apnea)    4. Hyperlipidemia, unspecified hyperlipidemia type    5. Multinodular goiter    6. Type II or unspecified type diabetes mellitus without mention of complication, uncontrolled      Uncontrolled htn needs complaince get her machine checked and add diuretics.  Plan:   hctz 25 mg po daily.  Record bp for 2 weeks   F/u with machine and bp reading with mid level in 2 weeks.

## 2017-02-10 NOTE — LETTER
February 10, 2017      Chelsea Bowen MD  [address] [place] [address]           [facility]  [address] [place] [address]  Phone: [phone]  Fax: [phone]          Patient: Lydia Danielson   MR Number: [id number]   YOB: 1955   Date of Visit: 2/10/2017       Dear Dr. Chelsea Bowen:    Thank you for referring Lydia Danielson to me for evaluation. Attached you will find relevant portions of my assessment and plan of care.    If you have questions, please do not hesitate to call me. I look forward to following Lydia Danielson along with you.    Sincerely,    aJtin Montez MD    Enclosure  CC:  No Recipients    If you would like to receive this communication electronically, please contact [email or web address] or [phone] to request more information on [employer] Link access.    For providers and/or their staff who would like to refer a patient to [facility], please contact us through our one-stop-shop provider referral line, [facility], at [phone].    If you feel you have received this communication in error or would no longer like to receive these types of communications, please e-mail [email or web address]

## 2017-02-10 NOTE — MR AVS SNAPSHOT
[facility]  [address]  [facility] [address]  Phone: [phone]  Fax: [phone]                  Lydia Danielson   2/10/2017 8:15 AM   Office Visit    Description:  Female : 1955   Provider:  Jatin Montez MD   Department:  [facility]           Diagnoses this Visit        Comments    Essential hypertension    -  Primary     Coronary artery disease, angina presence unspecified, unspecified vessel or lesion type, unspecified whether native or transplanted heart         NATASHA (obstructive sleep apnea)         Hyperlipidemia, unspecified hyperlipidemia type         Multinodular goiter         Type II or unspecified type diabetes mellitus without mention of complication, uncontrolled                To Do List           Future Appointments        Provider Department Dept Phone    2/15/2017 1:15 PM [facility] MRI [facility] [phone]    3/6/2017 9:30 AM [facility] [id number] [facility] [phone]    2017 10:45 AM Chelsea Bowen MD [facility] [phone]      Your Future Surgeries/Procedures     Mar 01, 2017   Surgery with Chase Moffett III, MD   [facility])    [address]  [facility] [address] [phone]              Goals (5 Years of Data)     None      Follow-Up and Disposition     Return in about 2 weeks (around 2017).       These Medications        Disp Refills Start End    hydrochlorothiazide (HYDRODIURIL) 25 MG tablet 30 tablet 11 2/10/2017 2/10/2018    Take 1 tablet (25 mg total) by mouth once daily. - Oral    Pharmacy: [id number] - [place], [place] - [address] AT [facility] Ph #: [phone]         [facility]     [facility] Nurse Care Line -  Assistance  Registered nurses in the [facility] provide clinical advisement, health education, appointment booking, and other advisory  "services.  Call for this free service at [phone].             Medications           Message regarding Medications     Verify the changes and/or additions to your medication regime listed below are the same as discussed with your clinician today.  If any of these changes or additions are incorrect, please notify your healthcare provider.        START taking these NEW medications        Refills    hydrochlorothiazide (HYDRODIURIL) 25 MG tablet 11    Sig: Take 1 tablet (25 mg total) by mouth once daily.    Class: Normal    Route: Oral           Verify that the below list of medications is an accurate representation of the medications you are currently taking.  If none reported, the list may be blank. If incorrect, please contact your healthcare provider. Carry this list with you in case of emergency.           Current Medications     amlodipine (NORVASC) 10 MG tablet Take 1 tablet (10 mg total) by mouth once daily.    atorvastatin (LIPITOR) 40 MG tablet TAKE 1 TABLET BY MOUTH EVERY NIGHT AT BEDTIME    benazepril (LOTENSIN) 40 MG tablet Take 40 mg by mouth once daily.    cyclobenzaprine (FLEXERIL) 10 MG tablet Take 1 tablet (10 mg total) by mouth nightly.    diazePAM (VALIUM) 2 MG tablet Take 1 tablet (2 mg total) by mouth once as needed for Anxiety (30 minutes prior to procedure).    fluticasone (FLONASE) 50 mcg/actuation nasal spray 2 Spray, Suspension Nasal Every day.  each nostril    furosemide (LASIX) 40 MG tablet TAKE 1 TABLET BY MOUTH EVERY DAY    glimepiride (AMARYL) 4 MG tablet TAKE 1 TABLET BY MOUTH TWICE DAILY    ibuprofen (ADVIL,MOTRIN) 800 MG tablet Take by mouth. 1 Tablet Oral Twice a day .  with food    insulin glargine (TOUJEO SOLOSTAR) 300 unit/mL (1.5 mL) InPn Inject 10 Units into the skin every evening.    insulin needles, disposable, 32 x 5/32 " Ndle Use daily with Lantus    metformin (GLUCOPHAGE) 1000 MG tablet Take 1 tablet (1,000 mg total) by mouth 2 (two) times daily with meals.    " "metoprolol succinate (TOPROL-XL) 25 MG 24 hr tablet Take 25 mg by mouth once daily.    saxagliptin (ONGLYZA) 5 mg Tab tablet Take by mouth. 1 Tablet Oral Every day    sertraline (ZOLOFT) 50 MG tablet Take 1 tablet (50 mg total) by mouth once daily. For anxiety    sodium,potassium,mag sulfates (SUPREP BOWEL PREP KIT) 17.5-3.13-1.6 gram SolR As directed    WELCHOL 3.75 gram PwPk TAKE 1 PACKET BY MOUTH EVERY DAY AS DIRECTED    hydrochlorothiazide (HYDRODIURIL) 25 MG tablet Take 1 tablet (25 mg total) by mouth once daily.           Clinical Reference Information           Your Vitals Were     BP Pulse Height Weight BMI    194/80 (BP Location: Right arm, Patient Position: Sitting, BP Method: Manual) 58 5' 5" (1.651 m) 68.9 kg (152 lb) 25.29 kg/m2      Blood Pressure          Most Recent Value    Right Arm BP - Sitting  194/80    Left Arm BP - Sitting  190/88    BP  (!)  194/80      Allergies as of 2/10/2017     No Known Allergies      Immunizations Administered on Date of Encounter - 2/10/2017     None      Orders Placed During Today's Visit     Future Labs/Procedures Expected by Expires    Basic metabolic panel  2/24/2017 (Approximate) 2/10/2018    SCHEDULED EKG 12-LEAD (to Muse)  As directed 2/7/2018      Language Assistance Services     ATTENTION: Language assistance services are available, free of charge. Please call [phone].      ATENCIÓN: Si habla español, tiene a franks disposición servicios gratuitos de asistencia lingüística. Llame al [phone].     [facility] Ý: N?u b?n nói Ti?ng Vi?t, có các d?ch v? h? tr? ngôn ng? mi?n phí dành cho b?n. G?i s? [phone].         [facility] complies with applicable Federal civil rights laws and does not discriminate on the basis of race, color, national origin, age, disability, or sex.        "

## 2017-02-14 ENCOUNTER — TELEPHONE (OUTPATIENT)
Dept: RADIOLOGY | Facility: HOSPITAL | Age: 62
End: 2017-02-14

## 2017-02-15 ENCOUNTER — HOSPITAL ENCOUNTER (OUTPATIENT)
Dept: RADIOLOGY | Facility: HOSPITAL | Age: 62
Discharge: HOME | End: 2017-02-15
Attending: FAMILY MEDICINE
Payer: COMMERCIAL

## 2017-02-15 PROCEDURE — 72141 MRI NECK SPINE W/O DYE: CPT | Mod: TC,PO

## 2017-02-15 PROCEDURE — 72141 MRI NECK SPINE W/O DYE: CPT | Mod: 26,,, | Performed by: RADIOLOGY

## 2017-03-01 ENCOUNTER — SURGERY (OUTPATIENT)
Age: 62
End: 2017-03-01

## 2017-03-01 ENCOUNTER — HOSPITAL ENCOUNTER (OUTPATIENT)
Facility: HOSPITAL | Age: 62
Discharge: HOME | End: 2017-03-01
Attending: INTERNAL MEDICINE | Admitting: INTERNAL MEDICINE
Payer: COMMERCIAL

## 2017-03-01 ENCOUNTER — ANESTHESIA EVENT (OUTPATIENT)
Dept: ENDOSCOPY | Facility: HOSPITAL | Age: 62
End: 2017-03-01
Payer: COMMERCIAL

## 2017-03-01 ENCOUNTER — ANESTHESIA (OUTPATIENT)
Dept: ENDOSCOPY | Facility: HOSPITAL | Age: 62
End: 2017-03-01
Payer: COMMERCIAL

## 2017-03-01 VITALS
OXYGEN SATURATION: 99 % | SYSTOLIC BLOOD PRESSURE: 171 MMHG | HEART RATE: 66 BPM | TEMPERATURE: 97 F | DIASTOLIC BLOOD PRESSURE: 73 MMHG | BODY MASS INDEX: 25.33 KG/M2 | HEIGHT: 65 IN | WEIGHT: 152 LBS | RESPIRATION RATE: 18 BRPM

## 2017-03-01 DIAGNOSIS — E04.2 MULTINODULAR GOITER: Chronic | ICD-10-CM

## 2017-03-01 DIAGNOSIS — M47.812 CERVICAL ARTHRITIS: ICD-10-CM

## 2017-03-01 DIAGNOSIS — Z12.11 COLON CANCER SCREENING: Primary | ICD-10-CM

## 2017-03-01 DIAGNOSIS — Z78.0 ASYMPTOMATIC POSTMENOPAUSAL STATUS (AGE-RELATED) (NATURAL): Chronic | ICD-10-CM

## 2017-03-01 DIAGNOSIS — I10 ESSENTIAL HYPERTENSION: Chronic | ICD-10-CM

## 2017-03-01 DIAGNOSIS — G47.33 OSA (OBSTRUCTIVE SLEEP APNEA): ICD-10-CM

## 2017-03-01 DIAGNOSIS — G47.30 UNSPECIFIED SLEEP APNEA: ICD-10-CM

## 2017-03-01 DIAGNOSIS — M54.12 CERVICAL RADICULOPATHY: ICD-10-CM

## 2017-03-01 DIAGNOSIS — K57.30 DIVERTICULOSIS OF LARGE INTESTINE WITHOUT HEMORRHAGE: ICD-10-CM

## 2017-03-01 DIAGNOSIS — M54.2 NECK PAIN: ICD-10-CM

## 2017-03-01 LAB — POCT GLUCOSE: 192 MG/DL (ref 70–110)

## 2017-03-01 PROCEDURE — G0121 COLON CA SCRN NOT HI RSK IND: HCPCS | Mod: ,,, | Performed by: INTERNAL MEDICINE

## 2017-03-01 PROCEDURE — 37000009 HC ANESTHESIA EA ADD 15 MINS: Performed by: INTERNAL MEDICINE

## 2017-03-01 PROCEDURE — G0121 COLON CA SCRN NOT HI RSK IND: HCPCS | Performed by: INTERNAL MEDICINE

## 2017-03-01 PROCEDURE — 37000008 HC ANESTHESIA 1ST 15 MINUTES: Performed by: INTERNAL MEDICINE

## 2017-03-01 PROCEDURE — 25000003 PHARM REV CODE 250: Performed by: INTERNAL MEDICINE

## 2017-03-01 PROCEDURE — 82962 GLUCOSE BLOOD TEST: CPT | Performed by: INTERNAL MEDICINE

## 2017-03-01 PROCEDURE — 63600175 PHARM REV CODE 636 W HCPCS: Performed by: NURSE ANESTHETIST, CERTIFIED REGISTERED

## 2017-03-01 PROCEDURE — 25000003 PHARM REV CODE 250: Performed by: NURSE ANESTHETIST, CERTIFIED REGISTERED

## 2017-03-01 RX ORDER — LIDOCAINE HYDROCHLORIDE 20 MG/ML
INJECTION, SOLUTION EPIDURAL; INFILTRATION; INTRACAUDAL; PERINEURAL
Status: DISCONTINUED | OUTPATIENT
Start: 2017-03-01 | End: 2017-03-01

## 2017-03-01 RX ORDER — SODIUM CHLORIDE, SODIUM LACTATE, POTASSIUM CHLORIDE, CALCIUM CHLORIDE 600; 310; 30; 20 MG/100ML; MG/100ML; MG/100ML; MG/100ML
INJECTION, SOLUTION INTRAVENOUS CONTINUOUS
Status: DISCONTINUED | OUTPATIENT
Start: 2017-03-01 | End: 2017-03-01 | Stop reason: HOSPADM

## 2017-03-01 RX ORDER — PROPOFOL 10 MG/ML
VIAL (ML) INTRAVENOUS
Status: DISCONTINUED | OUTPATIENT
Start: 2017-03-01 | End: 2017-03-01

## 2017-03-01 RX ADMIN — LIDOCAINE HYDROCHLORIDE 40 MG: 20 INJECTION, SOLUTION EPIDURAL; INFILTRATION; INTRACAUDAL; PERINEURAL at 11:03

## 2017-03-01 RX ADMIN — PROPOFOL 50 MG: 10 INJECTION, EMULSION INTRAVENOUS at 11:03

## 2017-03-01 RX ADMIN — PROPOFOL 150 MG: 10 INJECTION, EMULSION INTRAVENOUS at 11:03

## 2017-03-01 NOTE — ANESTHESIA PREPROCEDURE EVALUATION
03/01/2017  Lydia Danielson is a 61 y.o., female.    [facility] Anesthesia Evaluation    I have reviewed the Patient Summary Reports.    I have reviewed the Nursing Notes.   I have reviewed the Medications.     Review of Systems  Anesthesia Hx:  No problems with previous Anesthesia  Denies Family Hx of Anesthesia complications.   Denies Personal Hx of Anesthesia complications.   Social:  Non-Smoker, No Alcohol Use    Cardiovascular:   Exercise tolerance: good Hypertension, well controlled CAD asymptomatic  hyperlipidemia    Pulmonary:   Sleep Apnea, CPAP    Renal/:  Renal/ Normal     Hepatic/GI:   Bowel Prep. GERD, well controlled Water last at 5 am   Neurological:   TIA, Neuromuscular Disease,    Endocrine:   Diabetes, poorly controlled, type 2 Hyperthyroidism        Physical Exam  General:  Well nourished    Airway/Jaw/Neck:  Airway Findings: Mouth Opening: Normal Tongue: Normal  General Airway Assessment: Adult  Mallampati: II  TM Distance: Normal, at least 6 cm       Chest/Lungs:  Chest/Lungs Findings: Clear to auscultation     Heart/Vascular:  Heart Findings: Rate: Normal  Rhythm: Regular Rhythm  Sounds: Normal             Anesthesia Plan  Type of Anesthesia, risks & benefits discussed:  Anesthesia Type:  MAC  Patient's Preference:   Intra-op Monitoring Plan:   Intra-op Monitoring Plan Comments:   Post Op Pain Control Plan:   Post Op Pain Control Plan Comments:   Induction:    Beta Blocker:  Patient is on a Beta-Blocker and has received one dose within the past 24 hours (No further documentation required).       Informed Consent: Patient understands risks and agrees with Anesthesia plan.  Questions answered. Anesthesia consent signed with patient.  ASA Score: 3     Day of Surgery Review of History & Physical: I have interviewed and examined the patient. I have reviewed the patient's H&P dated:  There are  no significant changes.          Ready For Surgery From Anesthesia Perspective.

## 2017-03-01 NOTE — TRANSFER OF CARE
"Anesthesia Transfer of Care Note    Patient: Lydia Danielson    Procedure(s) Performed: Procedure(s) (LRB):  COLONOSCOPY (N/A)    Patient location: PACU    Anesthesia Type: MAC    Transport from OR: Transported from OR on room air with adequate spontaneous ventilation    Post pain: adequate analgesia    Post assessment: no apparent anesthetic complications and tolerated procedure well    Post vital signs: stable    Level of consciousness: awake and responds to stimulation    Nausea/Vomiting: no nausea/vomiting    Complications: none          Last vitals:   Visit Vitals    BP (!) 150/94    Pulse (!) 58    Temp 36 °C (96.8 °F)    Resp 14    Ht 5' 5" (1.651 m)    Wt 68.9 kg (152 lb)    SpO2 (!) 94%    Breastfeeding No    BMI 25.29 kg/m2     "

## 2017-03-01 NOTE — DISCHARGE INSTRUCTIONS

## 2017-03-01 NOTE — ANESTHESIA RELEASE NOTE
"Anesthesia Release from PACU Note    Patient: Lydia Danielson    Procedure(s) Performed: Procedure(s) (LRB):  COLONOSCOPY (N/A)    Anesthesia type: MAC    Post pain: Adequate analgesia    Post assessment: no apparent anesthetic complications, tolerated procedure well and no evidence of recall    Last Vitals:   Visit Vitals    BP (!) 150/94    Pulse (!) 58    Temp 36 °C (96.8 °F)    Resp 14    Ht 5' 5" (1.651 m)    Wt 68.9 kg (152 lb)    SpO2 (!) 94%    Breastfeeding No    BMI 25.29 kg/m2       Post vital signs: stable    Level of consciousness: awake, alert  and oriented    Nausea/Vomiting: no nausea/no vomiting    Complications: none    Airway Patency: patent    Respiratory: unassisted, spontaneous ventilation, room air    Cardiovascular: stable and blood pressure at baseline    Hydration: euvolemic  "

## 2017-03-01 NOTE — ANESTHESIA POSTPROCEDURE EVALUATION
"Anesthesia Post Evaluation    Patient: Lydia Danielson    Procedure(s) Performed: Procedure(s) (LRB):  COLONOSCOPY (N/A)    Final Anesthesia Type: MAC  Patient location during evaluation: PACU  Patient participation: Yes- Able to Participate  Level of consciousness: awake and alert and oriented  Post-procedure vital signs: reviewed and stable  Pain management: adequate  Airway patency: patent  PONV status at discharge: No PONV  Anesthetic complications: no      Cardiovascular status: blood pressure returned to baseline, hemodynamically stable and stable  Respiratory status: unassisted, room air and spontaneous ventilation  Hydration status: euvolemic  Follow-up not needed.        Visit Vitals    BP (!) 150/94    Pulse (!) 58    Temp 36 °C (96.8 °F)    Resp 14    Ht 5' 5" (1.651 m)    Wt 68.9 kg (152 lb)    SpO2 (!) 94%    Breastfeeding No    BMI 25.29 kg/m2       Pain/Maida Score: Pain Assessment Performed: Yes (3/1/2017 12:06 PM)  Presence of Pain: non-verbal indicators absent (3/1/2017 12:06 PM)  Maida Score: 9 (3/1/2017 12:06 PM)      "

## 2017-03-01 NOTE — IP AVS SNAPSHOT
[address] [place] [address]           Patient Discharge Instructions     Our goal is to set you up for success. This packet includes information on your condition, medications, and your home care. It will help you to care for yourself so you don't get sicker and need to go back to the hospital.     Please ask your nurse if you have any questions.        There are many details to remember when preparing to leave the hospital. Here is what you will need to do:    1. Take your medicine. If you are prescribed medications, review your Medication List in the following pages. You may have new medications to  at the pharmacy and others that you'll need to stop taking. Review the instructions for how and when to take your medications. Talk with your doctor or nurses if you are unsure of what to do.     2. Go to your follow-up appointments. Specific follow-up information is listed in the following pages. Your may be contacted by a transition nurse or clinical provider about future appointments. Be sure we have all of the phone numbers to reach you, if needed. Please contact your provider's office if you are unable to make an appointment.     3. Watch for warning signs. Your doctor or nurse will give you detailed warning signs to watch for and when to call for assistance. These instructions may also include educational information about your condition. If you experience any of warning signs to your health, call your doctor.               ** Verify the list of medication(s) below is accurate and up to date. Carry this with you in case of emergency. If your medications have changed, please notify your healthcare provider.             Medication List      ASK your doctor about these medications        Additional Info                      amlodipine 10 MG tablet   Commonly known as:  NORVASC   Quantity:  90 tablet   Refills:  0   Dose:  10 mg   Comments:  **Patient  requests 90 days supply**    Instructions:  Take 1 tablet (10 mg total) by mouth once daily.     Begin Date    AM    Noon    PM    Bedtime       atorvastatin 40 MG tablet   Commonly known as:  LIPITOR   Quantity:  90 tablet   Refills:  1   Comments:  **Patient requests 90 days supply**    Instructions:  TAKE 1 TABLET BY MOUTH EVERY NIGHT AT BEDTIME     Begin Date    AM    Noon    PM    Bedtime       benazepril 40 MG tablet   Commonly known as:  LOTENSIN   Refills:  0   Dose:  40 mg    Instructions:  Take 40 mg by mouth once daily.     Begin Date    AM    Noon    PM    Bedtime       cyclobenzaprine 10 MG tablet   Commonly known as:  FLEXERIL   Quantity:  30 tablet   Refills:  2   Dose:  10 mg    Instructions:  Take 1 tablet (10 mg total) by mouth nightly.     Begin Date    AM    Noon    PM    Bedtime       diazePAM 2 MG tablet   Commonly known as:  VALIUM   Quantity:  1 tablet   Refills:  0   Dose:  2 mg    Instructions:  Take 1 tablet (2 mg total) by mouth once as needed for Anxiety (30 minutes prior to procedure).     Begin Date    AM    Noon    PM    Bedtime       FLONASE 50 mcg/actuation nasal spray   Refills:  0   Generic drug:  fluticasone    Instructions:  2 Spray, Suspension Nasal Every day.  each nostril     Begin Date    AM    Noon    PM    Bedtime       furosemide 40 MG tablet   Commonly known as:  LASIX   Quantity:  90 tablet   Refills:  1   Comments:  **Patient requests 90 days supply**    Instructions:  TAKE 1 TABLET BY MOUTH EVERY DAY     Begin Date    AM    Noon    PM    Bedtime       glimepiride 4 MG tablet   Commonly known as:  AMARYL   Quantity:  180 tablet   Refills:  1   Comments:  **Patient requests 90 days supply**    Instructions:  TAKE 1 TABLET BY MOUTH TWICE DAILY     Begin Date    AM    Noon    PM    Bedtime       hydrochlorothiazide 25 MG tablet   Commonly known as:  HYDRODIURIL   Quantity:  30 tablet   Refills:  11   Dose:  25 mg    Instructions:  Take 1 tablet (25 mg total) by mouth once  "daily.     Begin Date    AM    Noon    PM    Bedtime       ibuprofen 800 MG tablet   Commonly known as:  ADVIL,MOTRIN   Refills:  0    Instructions:  Take by mouth. 1 Tablet Oral Twice a day .  with food     Begin Date    AM    Noon    PM    Bedtime       insulin glargine (TOUJEO) 300 unit/mL (1.5 mL) Inpn pen   Commonly known as:  TOUJEO SOLOSTAR   Quantity:  1.5 mL   Refills:  5   Dose:  10 Units    Instructions:  Inject 10 Units into the skin every evening.     Begin Date    AM    Noon    PM    Bedtime       metformin 1000 MG tablet   Commonly known as:  GLUCOPHAGE   Quantity:  180 tablet   Refills:  1   Dose:  1000 mg    Instructions:  Take 1 tablet (1,000 mg total) by mouth 2 (two) times daily with meals.     Begin Date    AM    Noon    PM    Bedtime       metoprolol succinate 25 MG 24 hr tablet   Commonly known as:  TOPROL-XL   Refills:  0   Dose:  25 mg    Instructions:  Take 25 mg by mouth once daily.     Begin Date    AM    Noon    PM    Bedtime       ONGLYZA 5 mg Tab tablet   Refills:  0   Generic drug:  SAXagliptin    Instructions:  Take by mouth. 1 Tablet Oral Every day     Begin Date    AM    Noon    PM    Bedtime       pen needle, diabetic 32 gauge x 5/32" Ndle   Quantity:  100 each   Refills:  2    Instructions:  Use daily with Lantus     Begin Date    AM    Noon    PM    Bedtime       sertraline 50 MG tablet   Commonly known as:  ZOLOFT   Quantity:  90 tablet   Refills:  1   Dose:  50 mg    Instructions:  Take 1 tablet (50 mg total) by mouth once daily. For anxiety     Begin Date    AM    Noon    PM    Bedtime       sodium,potassium,mag sulfates 17.5-3.13-1.6 gram Solr   Commonly known as:  SUPREP BOWEL PREP KIT   Quantity:  354 mL   Refills:  0    Instructions:  As directed     Begin Date    AM    Noon    PM    Bedtime       WELCHOL 3.75 gram Pwpk   Quantity:  90 each   Refills:  1   Comments:  **Patient requests 90 days supply**   Generic drug:  colesevelam    Instructions:  TAKE 1 PACKET BY MOUTH " EVERY DAY AS DIRECTED     Begin Date    AM    Noon    PM    Bedtime                  Please bring to all follow up appointments:    1. A copy of your discharge instructions.  2. All medicines you are currently taking in their original bottles.  3. Identification and insurance card.    Please arrive 15 minutes ahead of scheduled appointment time.    Please call 24 hours in advance if you must reschedule your appointment and/or time.        Your Scheduled Appointments     Mar 06, 2017  9:30 AM CST   Mammo Screening with [facility] [id number]   [facility] ([facility])    [address] [address]   [phone]            Mar 20, 2017 10:00 AM CDT   Established Patient Visit with JESSIE Whitaker   [facility] - Cardiology ([facility])    [address] [address]   [phone]            Apr 26, 2017 10:45 AM CDT   Established Patient Visit with Chelsea Bowen MD   [facility] ([facility])    [address] [address]   [phone]                  Discharge Instructions         Diverticulosis    Diverticulosis means that small pouches have formed in the wall of your large intestine (colon). Most often, this problem causes no symptoms and is common as people age. But the pouches in the colon are at risk of becoming infected. When this happens, the condition is called diverticulitis. Although most people with diverticulosis never develop diverticulitis, it is still not uncommon. Rectal bleeding can also occur and in less common situations, a type of colon inflammation called colitis.  While most people do not have symptoms, some people with diverticulosis may have:  · Abdominal cramps and pain  · Bloating  · Constipation  · Change in bowel habits  Causes  The exact cause of diverticulosis (and diverticulitis) has not been proved, but a few things are associated with the condition:  · Low-fiber diet  · Constipation  · Lack of  exercise  Your healthcare provider will talk with you about how to manage your condition. Diet changes may be all that are needed to help control diverticulosis and prevent progression to diverticulitis. If you develop diverticulitis, you will likely need other treatments.  Home care  You may be told to take fiber supplements daily. Fiber adds bulk to the stool so that it passes through the colon more easily. Stool softeners may be recommended. You may also be given medications for pain relief. Be sure to take all medications as directed.  In the past, people were told to avoid corn, nuts, and seeds. This is no longer necessary.  Follow these guidelines when caring for yourself at home:  · Eat unprocessed foods that are high in fiber. Whole grains, fruits, and vegetables are good choices.  · Drink 6 to 8 glasses of water every day unless your healthcare provider has you limit how much fluid you should have.  · Watch for changes in your bowel movements. Tell your provider if you notice any changes.  · Begin an exercise program. Ask your provider how to get started. Generally, walking is the best.  · Get plenty of rest and sleep.  Follow-up care  Follow up with your healthcare provider, or as advised. Regular visits may be needed to check on your health. Sometimes special procedures such as colonoscopy, are needed after an episode of diverticulitis or blooding. Be sure to keep all your appointments.  If a stool sample was taken, or cultures were done, you should be told if they are positive, or if your treatment needs to be changed. You can call as directed for the results.  If X-rays were done, a radiologist will look at them. You will be told if there is a change in your treatment.  If antibiotics were prescribed, be sure to finish them all.  When to seek medical advice  Call your healthcare provider right away if any of these occur:  · Fever of 100.4°F (38°C) or higher, or as directed by your healthcare  provider  · Severe cramps in the lower left side of the abdomen or pain that is getting worse  · Tenderness in the lower left side of the abdomen or worsening pain throughout the abdomen  · Diarrhea or constipation that doesn't get better within 24 hours  · Nausea and vomiting  · Bleeding from the rectum  Call 911  Call emergency services if any of the following occur:  · Trouble breathing  · Confusion  · Very drowsy or trouble awakening  · Fainting or loss of consciousness  · Rapid heart rate  · Chest pain  Date Last Reviewed: 12/30/2015  © 3464-3100 [employer]. [address] [place] [address]. All rights reserved. This information is not intended as a substitute for professional medical care. Always follow your healthcare professional's instructions.            Admission Information     Date & Time Provider Department CSN    3/1/2017  9:03 AM Chase Moffett III, MD [facility] -  [id number]      Care Providers     Provider Role Specialty Primary office phone    Chase Moffett III, MD Attending Provider Gastroenterology [phone]    Chase Moffett III, MD Surgeon  Gastroenterology [phone]      Your Vitals Were     BP                   150/94           Recent Lab Values        5/15/2012 10/15/2012 2/19/2013 8/30/2013 12/8/2014 3/5/2015 1/7/2016 1/25/2017     10:10 AM 10:53 AM 11:05 AM  7:17 AM 11:21 AM  3:05 PM 10:03 AM 10:35 AM    A1C 8.0 (H) 8.9 (H) 8.5 (H) 7.7 (H) 8.5 (H) 9.1 (H) 8.7 (H) 9.8 (H)            9.8 (H)    Comment for A1C at 10:35 AM on 1/25/2017:  According to ADA guidelines, hemoglobin A1C <7.0% represents  optimal control in non-pregnant diabetic patients.  Different  metrics may apply to specific populations.   Standards of Medical Care in Diabetes - 2016.  For the purpose of screening for the presence of diabetes:  <5.7%     Consistent with the absence of diabetes  5.7-6.4%  Consistent with increasing risk for diabetes   (prediabetes)  >or=6.5%   Consistent with diabetes  Currently no consensus exists for use of hemoglobin A1C  for diagnosis of diabetes for children.      Comment for A1C at 10:35 AM on 1/25/2017:  According to ADA guidelines, hemoglobin A1C <7.0% represents  optimal control in non-pregnant diabetic patients.  Different  metrics may apply to specific populations.   Standards of Medical Care in Diabetes - 2016.  For the purpose of screening for the presence of diabetes:  <5.7%     Consistent with the absence of diabetes  5.7-6.4%  Consistent with increasing risk for diabetes   (prediabetes)  >or=6.5%  Consistent with diabetes  Currently no consensus exists for use of hemoglobin A1C  for diagnosis of diabetes for children.        Allergies as of 3/1/2017     No Known Allergies      [facility]     [facility] Care Line - 24/7 Assistance  Unless otherwise directed by your provider, please contact [facility], our nurse care line that is available for 24/7 assistance.     Registered nurses in the [facility] provide clinical advisement, health education, appointment booking, and other advisory services.  Call for this free service at [phone].        Advance Directives     An advance directive is a document which, in the event you are no longer able to make decisions for yourself, tells your healthcare team what kind of treatment you do or do not want to receive, or who you would like to make those decisions for you.  If you do not currently have an advance directive, [facility] encourages you to create one.  For more information call:  [phone] ([phone]), [phone] ([phone]),  or log on to [email or web address].        Smoking Cessation     If you would like to quit smoking:   You may be eligible for free services if you are a [place] resident and started smoking cigarettes before September 1, 1988.  Call the Smoking Cessation [employer] (SCT) toll free at (251) 903-0308 or (807)  107-0058.   Call 1-800-QUIT-NOW if you do not meet the above criteria.            Language Assistance Services     ATTENTION: Language assistance services are available, free of charge. Please call [phone].      ATENCIÓN: Si habla cait, tiene a franks disposición servicios gratuitos de asistencia lingüística. Llame al [phone].     CHÚ Ý: N?u b?n nói Ti?ng Vi?t, có các d?ch v? h? tr? ngôn ng? mi?n phí dành cho b?n. G?i s? [phone].        Diabetes Discharge Instructions                                    [facility] -  complies with applicable Federal civil rights laws and does not discriminate on the basis of race, color, national origin, age, disability, or sex.

## 2017-03-01 NOTE — INTERVAL H&P NOTE
The patient has been examined and the H&P has been reviewed:I have reviewed this note and I agree with this assessment. The patient remains stable for endoscopy at the time of this present evaluation. GH        Anesthesia/Surgery risks, benefits and alternative options discussed and understood by patient/family.          Active Hospital Problems    Diagnosis  POA    Colon cancer screening [Z12.11]  Not Applicable      Resolved Hospital Problems    Diagnosis Date Resolved POA   No resolved problems to display.

## 2017-03-01 NOTE — H&P
Short Stay Endoscopy History and Physical    PCP - Chelsea Bowen MD    Procedure - Colonoscopy  ASA - 3  Mallampati - per anesthesia  History of Anesthesia problems - no  Family history Anesthesia problems -  no     HPI:  This is a 61 y.o.female here for evaluation of : Colon Cancer Screen    Reflux - no  Dysphagia - no  Abdominal pain - no  Diarrhea - no  Anemia - no  GI bleeding - no  Nausea and vomiting-no  Early satiety-no  aversion to sight or smell of food-no    ROS:  Constitutional: No fevers, chills, No weight loss  ENT: No allergies  CV: No chest pain  Pulm: No cough, No shortness of breath  Ophtho: No vision changes  GI: see HPI  Derm: No rash  Heme: No lymphadenopathy, No bruising  MSK: No arthritis  : No dysuria, No hematuria  Endo: No hot or cold intolerance  Neuro: No syncope, No seizure  Psych: No anxiety, No depression    Medical History:  Past Medical History:   Diagnosis Date    Allergic rhinitis, cause unspecified     CAD (coronary artery disease)     S/P MI in 2002; followed by Dr. Blevins, cardiologist    Diabetes mellitus, type 2     External hemorrhoid     GERD (gastroesophageal reflux disease)     Hyperlipidemia     Hypertension     Keloid     Multinodular goiter     Neck arthritis 07/28/2016    NATASHA (obstructive sleep apnea)     Postmenopausal     No history of abnormal pap smear       Surgical History:  Past Surgical History:   Procedure Laterality Date    APPENDECTOMY  2011    CARDIAC CATHETERIZATION      Left breast lumpectomy      NO cancer    TLH/BSO      Due to noncancerous reasons       Family History:  Family History   Problem Relation Age of Onset    Arthritis Mother     Lupus Sister     Diabetes Brother     Hypertension Brother     Diabetes Paternal Aunt     Cancer Cousin     Cancer Cousin     Heart disease Sister     Diabetes Sister     Hypertension Sister     Diabetes Sister     Hypertension Sister     Diabetes Brother     Hypertension Brother      "Cancer Brother      Pancreatic and lung cancer (Smoker)    Hypertension Son     Stroke Neg Hx        Social History:  Social History     Social History    Marital status:      Spouse name: N/A    Number of children: 1    Years of education: N/A     Occupational History    [employer]     [employer]     Social History Main Topics    Smoking status: Former Smoker     Packs/day: 0.10     Years: 0.20     Types: Cigarettes     Quit date: 7/8/1993    Smokeless tobacco: Never Used    Alcohol use No    Drug use: No    Sexual activity: Not Currently     Partners: Male     Birth control/ protection: Surgical, Post-menopausal     Other Topics Concern    Not on file     Social History Narrative    She wears seatbelt.       Allergies: Review of patient's allergies indicates:  No Known Allergies    Medications:   No current facility-administered medications on file prior to encounter.      Current Outpatient Prescriptions on File Prior to Encounter   Medication Sig Dispense Refill    amlodipine (NORVASC) 10 MG tablet Take 1 tablet (10 mg total) by mouth once daily. 90 tablet 0    atorvastatin (LIPITOR) 40 MG tablet TAKE 1 TABLET BY MOUTH EVERY NIGHT AT BEDTIME 90 tablet 1    benazepril (LOTENSIN) 40 MG tablet Take 40 mg by mouth once daily.      cyclobenzaprine (FLEXERIL) 10 MG tablet Take 1 tablet (10 mg total) by mouth nightly. 30 tablet 2    fluticasone (FLONASE) 50 mcg/actuation nasal spray 2 Spray, Suspension Nasal Every day.  each nostril      furosemide (LASIX) 40 MG tablet TAKE 1 TABLET BY MOUTH EVERY DAY 90 tablet 1    glimepiride (AMARYL) 4 MG tablet TAKE 1 TABLET BY MOUTH TWICE DAILY 180 tablet 1    ibuprofen (ADVIL,MOTRIN) 800 MG tablet Take by mouth. 1 Tablet Oral Twice a day .  with food      insulin glargine (TOUJEO SOLOSTAR) 300 unit/mL (1.5 mL) InPn Inject 10 Units into the skin every evening. 1.5 mL 5    insulin needles, disposable, 32 x 5/32 " Ndle Use " daily with Lantus 100 each 2    metformin (GLUCOPHAGE) 1000 MG tablet Take 1 tablet (1,000 mg total) by mouth 2 (two) times daily with meals. 180 tablet 1    metoprolol succinate (TOPROL-XL) 25 MG 24 hr tablet Take 25 mg by mouth once daily.      saxagliptin (ONGLYZA) 5 mg Tab tablet Take by mouth. 1 Tablet Oral Every day      sertraline (ZOLOFT) 50 MG tablet Take 1 tablet (50 mg total) by mouth once daily. For anxiety 90 tablet 1    WELCHOL 3.75 gram PwPk TAKE 1 PACKET BY MOUTH EVERY DAY AS DIRECTED 90 each 1       Objective Findings:    Vital Signs:There were no vitals filed for this visit.        Physical Exam:  General Appearance: Well appearing in no acute distress  Eyes:    No scleral icterus  ENT: Neck supple, Lips, mucosa, and tongue normal; teeth and gums normal  Lungs: CTA bilaterally in anterior and posterior fields, no wheezes, no crackles.  Heart:  Regular rate, S1, S2 normal, no murmurs heard.  Abdomen: Soft, non tender, non distended with normal bowel sounds. No hepatosplenomegaly, ascites, or mass.  Extremities: No clubbing, cyanosis or edema  Skin: No rash    Labs:  Reviewed    Plan:Colonoscopy  I have explained the risks and benefits of endoscopy procedures to the patient including but not limited to bleeding, perforation, infection, and death. The patient wishes to proceed.

## 2017-03-01 NOTE — DISCHARGE SUMMARY
[facility]  Brief Operative Note     SUMMARY     Surgery Date: 3/1/2017     Surgeon(s) and Role:     * Chase Moffett III, MD - Primary    Assisting Surgeon: None    Pre-op Diagnosis:  Colon cancer screening [Z12.11]    Post-op Diagnosis:  Post-Op Diagnosis Codes:     * Colon cancer screening [Z12.11]     - Diverticulosis  Procedure(s) (LRB):  COLONOSCOPY (N/A)    Anesthesia: Choice    Description of the findings of the procedure: Procedures completed. See Procedure note for full details.    Findings/Key Components: Procedures completed. See Procedure note for full details.    Prosthetics/Devices: None    Estimated Blood Loss: * No values recorded between 3/1/2017 12:00 AM and 3/1/2017 12:17 PM *         Specimens:   Specimen     None          Discharge Note    SUMMARY     Admit Date: 3/1/2017    Discharge Date and Time: 3/1/2017    Hospital Course (synopsis of major diagnoses, care, treatment, and services provided during the course of the hospital stay):  Procedures completed. See Procedure note for full details. Discharge patient when discharge criteria met.    Final Diagnosis: Post-Op Diagnosis Codes:     * Colon cancer screening [Z12.11]       Diverticulosis  Disposition: Discharge patient when discharge criteria met.    Follow Up/Patient Instructions:       Medications:  Reconciled Home Medications: Current Discharge Medication List      CONTINUE these medications which have NOT CHANGED    Details   amlodipine (NORVASC) 10 MG tablet Take 1 tablet (10 mg total) by mouth once daily.  Qty: 90 tablet, Refills: 0    Comments: **Patient requests 90 days supply**      atorvastatin (LIPITOR) 40 MG tablet TAKE 1 TABLET BY MOUTH EVERY NIGHT AT BEDTIME  Qty: 90 tablet, Refills: 1    Comments: **Patient requests 90 days supply**      benazepril (LOTENSIN) 40 MG tablet Take 40 mg by mouth once daily.      furosemide (LASIX) 40 MG tablet TAKE 1 TABLET BY MOUTH EVERY DAY  Qty: 90 tablet, Refills: 1     "Comments: **Patient requests 90 days supply**      glimepiride (AMARYL) 4 MG tablet TAKE 1 TABLET BY MOUTH TWICE DAILY  Qty: 180 tablet, Refills: 1    Comments: **Patient requests 90 days supply**      hydrochlorothiazide (HYDRODIURIL) 25 MG tablet Take 1 tablet (25 mg total) by mouth once daily.  Qty: 30 tablet, Refills: 11    Associated Diagnoses: Essential hypertension      ibuprofen (ADVIL,MOTRIN) 800 MG tablet Take by mouth. 1 Tablet Oral Twice a day .  with food      insulin glargine (TOUJEO SOLOSTAR) 300 unit/mL (1.5 mL) InPn Inject 10 Units into the skin every evening.  Qty: 1.5 mL, Refills: 5      insulin needles, disposable, 32 x 5/32 " Ndle Use daily with Lantus  Qty: 100 each, Refills: 2      metformin (GLUCOPHAGE) 1000 MG tablet Take 1 tablet (1,000 mg total) by mouth 2 (two) times daily with meals.  Qty: 180 tablet, Refills: 1      metoprolol succinate (TOPROL-XL) 25 MG 24 hr tablet Take 25 mg by mouth once daily.      WELCHOL 3.75 gram PwPk TAKE 1 PACKET BY MOUTH EVERY DAY AS DIRECTED  Qty: 90 each, Refills: 1    Comments: **Patient requests 90 days supply**      cyclobenzaprine (FLEXERIL) 10 MG tablet Take 1 tablet (10 mg total) by mouth nightly.  Qty: 30 tablet, Refills: 2      diazePAM (VALIUM) 2 MG tablet Take 1 tablet (2 mg total) by mouth once as needed for Anxiety (30 minutes prior to procedure).  Qty: 1 tablet, Refills: 0      fluticasone (FLONASE) 50 mcg/actuation nasal spray 2 Spray, Suspension Nasal Every day.  each nostril      saxagliptin (ONGLYZA) 5 mg Tab tablet Take by mouth. 1 Tablet Oral Every day      sertraline (ZOLOFT) 50 MG tablet Take 1 tablet (50 mg total) by mouth once daily. For anxiety  Qty: 90 tablet, Refills: 1      sodium,potassium,mag sulfates (SUPREP BOWEL PREP KIT) 17.5-3.13-1.6 gram SolR As directed  Qty: 354 mL, Refills: 0              Discharge Procedure Orders  Diet general     Activity as tolerated       "

## 2017-03-05 DIAGNOSIS — M50.30 DDD (DEGENERATIVE DISC DISEASE), CERVICAL: Primary | ICD-10-CM

## 2017-03-17 ENCOUNTER — INITIAL CONSULT (OUTPATIENT)
Dept: PHYSICAL MEDICINE AND REHAB | Facility: CLINIC | Age: 62
End: 2017-03-17
Payer: COMMERCIAL

## 2017-03-17 VITALS
HEIGHT: 65 IN | DIASTOLIC BLOOD PRESSURE: 86 MMHG | WEIGHT: 152 LBS | HEART RATE: 60 BPM | SYSTOLIC BLOOD PRESSURE: 170 MMHG | BODY MASS INDEX: 25.33 KG/M2

## 2017-03-17 DIAGNOSIS — R20.9 SKIN SENSATION DISTURBANCE: ICD-10-CM

## 2017-03-17 DIAGNOSIS — M79.18 MYOFACIAL MUSCLE PAIN: ICD-10-CM

## 2017-03-17 DIAGNOSIS — M67.911 DYSFUNCTION OF RIGHT ROTATOR CUFF: ICD-10-CM

## 2017-03-17 DIAGNOSIS — M47.812 DJD (DEGENERATIVE JOINT DISEASE), CERVICAL: ICD-10-CM

## 2017-03-17 DIAGNOSIS — M54.12 CERVICAL RADICULITIS: Primary | ICD-10-CM

## 2017-03-17 PROCEDURE — 1160F RVW MEDS BY RX/DR IN RCRD: CPT | Mod: S$GLB,,, | Performed by: PHYSICIAN ASSISTANT

## 2017-03-17 PROCEDURE — 99204 OFFICE O/P NEW MOD 45 MIN: CPT | Mod: S$GLB,,, | Performed by: PHYSICIAN ASSISTANT

## 2017-03-17 PROCEDURE — 3077F SYST BP >= 140 MM HG: CPT | Mod: S$GLB,,, | Performed by: PHYSICIAN ASSISTANT

## 2017-03-17 PROCEDURE — 99999 PR PBB SHADOW E&M-EST. PATIENT-LVL V: CPT | Mod: PBBFAC,,, | Performed by: PHYSICIAN ASSISTANT

## 2017-03-17 PROCEDURE — 3079F DIAST BP 80-89 MM HG: CPT | Mod: S$GLB,,, | Performed by: PHYSICIAN ASSISTANT

## 2017-03-17 RX ORDER — MELOXICAM 15 MG/1
15 TABLET ORAL DAILY
Qty: 30 TABLET | Refills: 1 | Status: SHIPPED | OUTPATIENT
Start: 2017-03-17 | End: 2019-12-09

## 2017-03-17 NOTE — LETTER
March 17, 2017      Chelsea Bowen MD  [address] [place] [address]           [facility] - Physiatry  [address] [facility] [address] [facility]  [place] [address]  Phone: [phone]  Fax: [phone]          Patient: Lydia Danielson   MR Number: [id number]   YOB: 1955   Date of Visit: 3/17/2017       Dear Dr. Chelsea Bowen:    Thank you for referring Lydia Danielson to me for evaluation. Attached you will find relevant portions of my assessment and plan of care.    If you have questions, please do not hesitate to call me. I look forward to following Lydia Danielson along with you.    Sincerely,    Brigido French PA-C    Enclosure  CC:  No Recipients    If you would like to receive this communication electronically, please contact [email or web address] or [phone] to request more information on [employer] Link access.    For providers and/or their staff who would like to refer a patient to [facility], please contact us through our one-stop-shop provider referral line, [facility] , at [phone].    If you feel you have received this communication in error or would no longer like to receive these types of communications, please e-mail [email or web address]

## 2017-03-17 NOTE — PROGRESS NOTES
Subjective:      Patient ID: Lydia Danielson is a 61 y.o. female.    Chief Complaint: Neck Pain    HPI Comments: Body part: Neck Pain    Occupation: [employer]    Dominant hand: Right    Referred by: Chelsea Bowen MD    Date of Injury: None    Patient's visit goal: To get some pain relief    Problem Description: Neck Pain since January 2017.  Complaining of popping in her neck. She has regular and intermittent R arm numbness- daily and not limited to sleep or activity.  SHe does lose some strength in the hand at times, but overall she remains fairly functional.  She complains of right shoulder discomfort.  Has been evaluated by her PCP and had an x-ray of the neck as well as MRI.  She denies any new bowel or bladder incontinence.  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  She is currently on a muscle relaxant and ibuprofen with some benefit, but her symptoms quickly returned.          Neck Pain    The current episode started more than 1 month ago (pain since January 2017). The problem occurs constantly. The problem has been gradually worsening. The pain is associated with nothing. The quality of the pain is described as aching. The pain is at a severity of 10/10. The pain is severe. Exacerbated by: activity. The pain is same all the time. Associated symptoms include chest pain. Pertinent negatives include no fever, numbness or weight loss. She has tried nothing for the symptoms. The treatment provided no relief.       Review of Systems   Constitution: Negative for chills, fever and weight loss.   HENT: Positive for ear pain and sore throat. Negative for congestion and hearing loss.    Eyes: Negative for double vision and pain.   Cardiovascular: Positive for chest pain. Negative for irregular heartbeat.   Respiratory: Positive for shortness of breath. Negative for  cough.    Endocrine: Negative for polyuria.   Hematologic/Lymphatic: Does not bruise/bleed easily.   Skin: Positive for itching. Negative for poor wound healing, rash and suspicious lesions.   Musculoskeletal: Positive for joint pain and neck pain. Negative for arthritis and joint swelling.   Gastrointestinal: Negative for abdominal pain, nausea and vomiting.   Genitourinary: Negative for bladder incontinence and frequency.   Neurological: Negative for loss of balance, numbness, paresthesias, sensory change and tremors.   Psychiatric/Behavioral: Negative for depression. The patient is not nervous/anxious.    Allergic/Immunologic: Negative for hives.         Objective:            General    Nursing note and vitals reviewed.  Constitutional: She is oriented to person, place, and time. She appears well-developed and well-nourished. No distress.   Neurological: She is alert and oriented to person, place, and time.   Psychiatric: She has a normal mood and affect. Her behavior is normal. Judgment and thought content normal.     General Musculoskeletal Exam   Gait: normal     Back (L-Spine & T-Spine) / Neck (C-Spine) Exam     Tenderness   The patient is tender to palpation of the right trapezial and left trapezial.     Back (L-Spine & T-Spine) Range of Motion   Extension: normal   Flexion: normal   Lateral Bend Right: normal   Lateral Bend Left: normal   Rotation Right: normal   Rotation Left: normal     Neck (C-Spine) Range of Motion   Flexion:     Mild and limited  Extension: Moderate and limited  Right Lateral Bend: abnormal  Left Lateral Bend: normal  Right Rotation: abnormal  Left Rotation: normal    Back (L-Spine & T-Spine) Tests   Right Side Tests  Femoral Stretch: negative  Left Side Tests  Femoral Stretch: negative    Neck (C-Spine) Tests   Spurling's Test   Left:  Negative  Right: negative    Comments:  Inspection: No deformity of spinal alignment.  She does stand with a mildly protracted and right shoulder  slouch.  Palpation: No vertebral tenderness to palpation.  The moderate amount supportive myofascial tenderness with radiation into the splenius.  Sensaton:tested to light touch - intact in all four limbs, but diminished in the right upper extremity diffusely suggesting possible combination of cervical and/or myofascial involvement  No focal atrophy is noted of either upper or lower extremity.  bilateral forearm tattoos      Neurological Exam:  Cranial Nerves: II-XII grossly intact  Left Hand/Wrist Exam     Tests   Phalens Sign: positive        Right Shoulder Exam     Inspection/Observation   Scars: absent  Deformity: absent    Tenderness   The patient is tender to palpation of the biceps tendon, acromioclavicular joint and greater tuberosity.    Range of Motion   Active Abduction: 100   External Rotation 0 degrees: normal   Internal Rotation 0 degrees: normal     Left Shoulder Exam     Inspection/Observation   Scars: absent  Deformity: absent    Tenderness   The patient is tender to palpation of the biceps tendon.    Range of Motion   Active Abduction: normal   External Rotation 0 degrees: normal   Internal Rotation 0 degrees: normal       Muscle Strength   Right Upper Extremity   Shoulder Abduction: 4/5   Shoulder Internal Rotation: 4/5   Shoulder External Rotation: 4/5   Biceps: /5   Deltoid:  4/5  Wrist Extension: 5/5 (-)/5   Wrist Flexion: /   : /5   Finger Flexors:  /5  Finger Extensors:  5/5  Left Upper Extremity  Shoulder Abduction:    Shoulder Internal Rotation: /5   Shoulder External Rotation: /   Biceps: /5   Deltoid:  5/5  Wrist Extension: /   Wrist Flexion: /   :  /   Finger Flexors:  5/5  Finger Extensors:  5/5  Right Lower Extremity   Hip Abduction: 5/   Hip Flexion: 5/   Quadriceps:  5/5   Hamstrin/   Gastrocsoleus:  /  EHL:  5/5  Left Lower Extremity   Hip Abduction: 5/5   Hip Flexion: /   Quadriceps:  /   Hamstrin/   Gastrocsoleus:   5/5/5  EHL:  5/5    Reflexes     Left Side  Biceps:  2+  Triceps:  2+  Brachioradialis:  2+  Achilles:  2+  Left Alatorre's Sign:  Absent  Ankle Clonus:  absent    Right Side   Biceps:  2+  Triceps:  2+  Brachioradialis:  2+  Achilles:  2+  Right Alatorre's Sign:  absent  Ankle Clonus:  absent    Vascular Exam     Right Pulses      Radial:                    2+      Left Pulses      Radial:                    2+      Capillary Refill  Right Hand: normal capillary refill  Left Hand: normal capillary refill      I have reviewed the films and report. I agree with the radiologist interpretation of the radiographic findings:  Findings: There is slight reversal of the cervical lordosis.  Mild degenerative vertebral endplate spurring with disc space narrowing and desiccation observed from C3-C4 through C5-C6.  Discogenic subendplate marrow signal changes present at the same levels.  Cervical cord and craniocervical junction are unremarkable.  Paravertebral soft tissues are symmetric and normal in appearance.  Left lobe of the thyroid gland is asymmetrically enlarged with several small cysts and equivocal isointense exophytic nodule in the posterior interpolar region.    C2-C3: Unremarkable.    C3-C4: Mild uncovertebral hypertrophy and left facet arthropathy.  Moderate left and mild right foraminal narrowing.  No canal stenosis.    C4-C5: Posterior disc osteophyte with slight right paracentral asymmetry and bilateral uncovertebral hypertrophy resulting in moderate bilateral foraminal narrowing.  No canal stenosis.    C5-C6: Posterior disc osteophyte with left paracentral protrusion and bilateral uncovertebral hypertrophy.  Moderate to marked foraminal narrowing, greater on the left.  No significant canal stenosis.    C6-C7: Minimal posterior disc bulge with left greater than right uncovertebral and facet hypertrophy resulting in marked left and moderate right foraminal narrowing.    C7-T1: Unremarkable        Assessment:        Encounter Diagnoses   Name Primary?    Cervical radiculitis Yes    DJD (degenerative joint disease), cervical     Myofacial muscle pain     Skin sensation disturbance     Dysfunction of right rotator cuff           Plan:       Lydia was seen today for neck pain.    Diagnoses and all orders for this visit:    Cervical radiculitis  -     Ambulatory Referral to ENT  -     Ambulatory Referral to Physical/Occupational Therapy  -     Nerve conduction test; Future    DJD (degenerative joint disease), cervical  -     Ambulatory Referral to ENT  -     Ambulatory Referral to Physical/Occupational Therapy  -     Nerve conduction test; Future    Myofacial muscle pain  -     Ambulatory Referral to ENT  -     Ambulatory Referral to Physical/Occupational Therapy  -     Nerve conduction test; Future    Skin sensation disturbance  -     Ambulatory Referral to ENT  -     Ambulatory Referral to Physical/Occupational Therapy  -     Nerve conduction test; Future    Dysfunction of right rotator cuff  -     Ambulatory Referral to ENT  -     Ambulatory Referral to Physical/Occupational Therapy    Other orders  -     meloxicam (MOBIC) 15 MG tablet; Take 1 tablet (15 mg total) by mouth once daily.    We reviewed the x-rays and MRI in the room.  She does have positive findings on the MRI, she first 2 trial conservative care.  We've enrolled her in to physical therapy with consideration of myofascial release and dry needling closer to her home/work.  We have changed her anti-inflammatory.  She will continue the muscle relaxant.  We discussed moist heat over the shoulders.  In the future, we may need to assess for underlying rotator cuff disease.  She will have an upper extremity EMG to assess for double crush syndrome as well.  I've asked her to see interventional spine in approximately 7 weeks for evaluation and consideration of cervical injections if no improvement with PT.       We have referred her to ENT to evaluate her swallowing  difficulty in the presence of positive thyroid findings on MRI.    The patient understands, chooses and consents to this plan and accepts all   the risks which include but are not limited to the risks mentioned above.   Pt understands the alternative of having no testing, intervention or       treatment at this time. Pt left content and without questions.     Disclaimer: This note was prepared using a voice recognition system and is likely to have sound alike errors within the text.

## 2017-03-24 ENCOUNTER — OFFICE VISIT (OUTPATIENT)
Dept: OTOLARYNGOLOGY | Facility: CLINIC | Age: 62
End: 2017-03-24
Payer: COMMERCIAL

## 2017-03-24 VITALS
SYSTOLIC BLOOD PRESSURE: 187 MMHG | WEIGHT: 157.44 LBS | HEART RATE: 51 BPM | DIASTOLIC BLOOD PRESSURE: 79 MMHG | BODY MASS INDEX: 26.19 KG/M2 | TEMPERATURE: 98 F

## 2017-03-24 DIAGNOSIS — E04.2 MULTINODULAR GOITER: Primary | ICD-10-CM

## 2017-03-24 PROCEDURE — 99244 OFF/OP CNSLTJ NEW/EST MOD 40: CPT | Mod: S$GLB,,, | Performed by: OTOLARYNGOLOGY

## 2017-03-24 PROCEDURE — 99999 PR PBB SHADOW E&M-EST. PATIENT-LVL III: CPT | Mod: PBBFAC,,, | Performed by: OTOLARYNGOLOGY

## 2017-03-24 NOTE — LETTER
March 24, 2017      Brigido French PA-C  [address] [place] [address]           [facility] - ENT  [address] [place] [address]  Phone: [phone]  Fax: [phone]          Patient: Lydia Danielson   MR Number: [id number]   YOB: 1955   Date of Visit: 3/24/2017       Dear Brigido French:    Thank you for referring Lydia Danielson to me for evaluation. Attached you will find relevant portions of my assessment and plan of care.    If you have questions, please do not hesitate to call me. I look forward to following Lydia Danielson along with you.    Sincerely,    Richy Medina MD    Enclosure  CC:  No Recipients    If you would like to receive this communication electronically, please contact [email or web address] or [phone] to request more information on [employer] Link access.    For providers and/or their staff who would like to refer a patient to [facility], please contact us through our one-stop-shop provider referral line, [facility] , at [phone].    If you feel you have received this communication in error or would no longer like to receive these types of communications, please e-mail [email or web address]

## 2017-03-24 NOTE — MR AVS SNAPSHOT
[facility] - ENT  [address] [place] [address]  Phone: [phone]  Fax: [phone]                  Lydia Danielson   3/24/2017 11:15 AM   Office Visit    Description:  Female : 1955   Provider:  Richy Medina MD   Department[facility]  [facility] - ENT           Reason for Visit     Thyroid Nodule           Diagnoses this Visit        Comments    Multinodular goiter    -  Primary            To Do List           Future Appointments        Provider Department Dept Phone    3/28/2017 10:00 AM [facility] [phone]    2017 1:00 PM Mikayla Prescott MD [facility] Phys[facility] [phone]    2017 10:45 AM Chelsea Bowen MD [facility] [phone]    5/15/2017 9:00 AM Rupesh Tucker MD [facility] [phone]      Goals (5 Years of Data)     None      [facility]     [facility] Care [facility] -  Assistance  Registered nurses in the [facility] provide clinical advisement, health education, appointment booking, and other advisory services.  Call for this free service at [phone].             Medications           Message regarding Medications     Verify the changes and/or additions to your medication regime listed below are the same as discussed with your clinician today.  If any of these changes or additions are incorrect, please notify your healthcare provider.             Verify that the below list of medications is an accurate representation of the medications you are currently taking.  If none reported, the list may be blank. If incorrect, please contact your healthcare provider. Carry this list with you in case of emergency.           Current Medications     amlodipine (NORVASC) 10 MG tablet Take 1 tablet (10 mg total) by mouth once daily.    atorvastatin (LIPITOR) 40 MG tablet TAKE 1 TABLET BY MOUTH EVERY NIGHT AT BEDTIME    benazepril (LOTENSIN) 40 MG tablet Take 40 mg by mouth once daily.     "cyclobenzaprine (FLEXERIL) 10 MG tablet Take 1 tablet (10 mg total) by mouth nightly.    diazePAM (VALIUM) 2 MG tablet Take 1 tablet (2 mg total) by mouth once as needed for Anxiety (30 minutes prior to procedure).    fluticasone (FLONASE) 50 mcg/actuation nasal spray 2 Spray, Suspension Nasal Every day.  each nostril    furosemide (LASIX) 40 MG tablet TAKE 1 TABLET BY MOUTH EVERY DAY    glimepiride (AMARYL) 4 MG tablet TAKE 1 TABLET BY MOUTH TWICE DAILY    hydrochlorothiazide (HYDRODIURIL) 25 MG tablet Take 1 tablet (25 mg total) by mouth once daily.    ibuprofen (ADVIL,MOTRIN) 800 MG tablet Take by mouth. 1 Tablet Oral Twice a day .  with food    insulin glargine (TOUJEO SOLOSTAR) 300 unit/mL (1.5 mL) InPn Inject 10 Units into the skin every evening.    insulin needles, disposable, 32 x 5/32 " Ndle Use daily with Lantus    meloxicam (MOBIC) 15 MG tablet Take 1 tablet (15 mg total) by mouth once daily.    metformin (GLUCOPHAGE) 1000 MG tablet Take 1 tablet (1,000 mg total) by mouth 2 (two) times daily with meals.    metoprolol succinate (TOPROL-XL) 25 MG 24 hr tablet Take 25 mg by mouth once daily.    saxagliptin (ONGLYZA) 5 mg Tab tablet Take by mouth. 1 Tablet Oral Every day    sertraline (ZOLOFT) 50 MG tablet Take 1 tablet (50 mg total) by mouth once daily. For anxiety    WELCHOL 3.75 gram PwPk TAKE 1 PACKET BY MOUTH EVERY DAY AS DIRECTED           Clinical Reference Information           Your Vitals Were     BP Pulse Temp Weight BMI    187/79 51 98 °F (36.7 °C) (Tympanic) 71.4 kg (157 lb 6.5 oz) 26.19 kg/m2      Blood Pressure          Most Recent Value    BP  (!)  187/79      Allergies as of 3/24/2017     No Known Allergies      Immunizations Administered on Date of Encounter - 3/24/2017     None      Orders Placed During Today's Visit     Future Labs/Procedures Expected by Expires    US Soft Tissue Head Neck Thyroid  3/24/2017 3/24/2018      Language Assistance Services     ATTENTION: Language assistance " services are available, free of charge. Please call [phone].      ATENCIÓN: Si habla cait, tiene a franks disposición servicios gratuitos de asistencia lingüística. Llame al [phone].     CHÚ Ý: N?u b?n nói Ti?ng Vi?t, có các d?ch v? h? tr? ngôn ng? mi?n phí dành cho b?n. G?i s? [phone].         [facility] complies with applicable Federal civil rights laws and does not discriminate on the basis of race, color, national origin, age, disability, or sex.

## 2017-03-24 NOTE — PROGRESS NOTES
Referring Provider:    Brigido French Pa-c  [address] [facility] [place] [address]  Subjective:   Patient: Lydia Danielson [id number], :1955   Visit date:3/24/2017 4:37 PM    Chief Complaint:  Thyroid Nodule (review MRI )    HPI:  Lydia is a 61 y.o. female who I was asked to see in consultation for evaluation of the following issue(s):    COMPRESSIVE SYMPTOMS OR MINOR RISK FACTORS FOR THYROID CANCER:  Increasing in size over the past 6 months:  No  Dysphagia: No  Dyspnea on exertion:  No  Orthopnea:  No  Hemoptysis:  No  Voice changes:  No  Pain:  No  AGE >45  Yes  FEMALE Yes    HIGH RISK HISTORY FOR THYROID CANCER:  Thyroid cancer in 1 or more first degree relatives:  No-  Mother and sisters had thyroid surgery for MNG  History of radiation:  No  Prior thyroidectomy with dx of thyroid cancer:  No  PET positive nodule:  No  Multiple Endocrine Neoplasia:  No  Familial Medullary Thyroid Cancer:  No    (2009 REVISED [employer]ICAN [employer]IATION MANAGEMENT GUIDELINES FOR PATIENTS WITH THYROID NODULES AND DIFFERENTIATED THYROID CANCER)      TSH:  normal ; slightly low    Review of Systems:  Negative unless checked off.  Gen:  []fever   []fatigue  HENT:  []nosebleeds  []dental problem   Eyes:  []photophobia  []visual disturbance  Resp:  []chest tightness []wheezing  Card:  []chest pain  []leg swelling  GI:  []abdominal pain []blood in stool  :  []dysuria  []hematuria  Musc:  []joint swelling  []gait problem  Skin:  []color change  []pallor  Neuro:  []seizures  []numbness  Hem:  []bruise/bleed easily  Psych:  []hallucinations  []behavioral problems  Allergy/Imm: has No Known Allergies.    Her meds, allergies, medical, surgical, social & family histories were reviewed & updated:  -     She has a current medication list which includes the following prescription(s): amlodipine, atorvastatin, benazepril, cyclobenzaprine, diazepam, fluticasone, furosemide, glimepiride, hydrochlorothiazide, ibuprofen, insulin  glargine (toujeo), pen needle, diabetic, meloxicam, metformin, metoprolol succinate, saxagliptin, sertraline, and welchol.  -     She  has a past medical history of Allergic rhinitis, cause unspecified; CAD (coronary artery disease); Diabetes mellitus, type 2; External hemorrhoid; GERD (gastroesophageal reflux disease); Hyperlipidemia; Hypertension; Keloid; Multinodular goiter; Neck arthritis (07/28/2016); NATASHA (obstructive sleep apnea); and Postmenopausal.   -     She  does not have any pertinent problems on file.   -     She  has a past surgical history that includes Left breast lumpectomy; TLH/BSO; Appendectomy (2011); Cardiac catheterization; and Colonoscopy (N/A, 3/1/2017).  -     She  reports that she quit smoking about 23 years ago. Her smoking use included Cigarettes. She has a 0.02 pack-year smoking history. She has never used smokeless tobacco. She reports that she does not drink alcohol or use illicit drugs.  -     Her family history includes Arthritis in her mother; Cancer in her brother, cousin, and cousin; Diabetes in her brother, brother, paternal aunt, sister, and sister; Heart disease in her sister; Hypertension in her brother, brother, sister, sister, and son; Lupus in her sister. There is no history of Stroke.  -     She has No Known Allergies.    Objective:   Physical Exam:  Vitals:  BP (!) 187/79  Pulse (!) 51  Temp 98 °F (36.7 °C) (Tympanic)   Wt 71.4 kg (157 lb 6.5 oz)  BMI 26.19 kg/m2  General appearance:  Well developed, well nourished    Eyes:  Extraocular motions intact, PERRL    Communication:  no hoarseness, no dysphonia    Ears:  Otoscopy of external auditory canals and tympanic membranes was normal, clinical speech reception thresholds grossly intact, no mass/lesion of auricle.  Nose:  No masses/lesions of external nose, nasal mucosa, septum, and turbinates were within normal limits.  Mouth:  No mass/lesion of lips, teeth, gums, hard/soft palate, tongue, tonsils, or  oropharynx.    Cardiovascular:  No pedal edema; Radial Pulses +2     Neck & Lymphatics:  No cervical lymphadenopathy, no neck mass/crepitus/ asymmetry, trachea is midline, palpable left thyroid nodule    Psych: Oriented x3,  Alert with normal mood and affect.     Respiration/Chest:  Symmetric expansion during respiration, normal respiratory effort.    Skin:  Warm and intact. No ulcerations of face, scalp, neck.      ULTRASOUND:  Last US 2011.   She had MRI which demonstrated numerous nodules     PATHOLOGY:  None recently.  She had an FNA about 10 years ago and was told it was benign  Assessment & Plan:   Lydia was seen today for thyroid nodule.    Diagnoses and all orders for this visit:    Multinodular goiter  -     US Soft Tissue Head Neck Thyroid; Future      Lydia has presents for initial evaluation with a complex thyroid problem.  The imaging and laboratory values were reviewed at length.  Lydia does not have compressive symptoms or cosmetic deformity from the size of the mass.  Lydia does not have a HIGH RISK HISTORY for thyroid cancer.    Lydia has had a prior biopsy:  Many years ago.  She was told it was benign.     Bilateral nodules: yes  Nodules 3cm or greater: No    Based on a lengthy discussion of treatment options and the above information, my recommendation is repeat Ultrasound.  Will call with results.  If FNA is needed will order US guided FNA.

## 2017-03-27 ENCOUNTER — TELEPHONE (OUTPATIENT)
Dept: RADIOLOGY | Facility: HOSPITAL | Age: 62
End: 2017-03-27

## 2017-03-28 ENCOUNTER — HOSPITAL ENCOUNTER (OUTPATIENT)
Dept: RADIOLOGY | Facility: HOSPITAL | Age: 62
Discharge: HOME | End: 2017-03-28
Attending: OTOLARYNGOLOGY
Payer: COMMERCIAL

## 2017-03-28 ENCOUNTER — OFFICE VISIT (OUTPATIENT)
Dept: CARDIOLOGY | Facility: CLINIC | Age: 62
End: 2017-03-28
Payer: COMMERCIAL

## 2017-03-28 VITALS
HEIGHT: 65 IN | HEART RATE: 54 BPM | BODY MASS INDEX: 25.38 KG/M2 | WEIGHT: 152.31 LBS | SYSTOLIC BLOOD PRESSURE: 156 MMHG | DIASTOLIC BLOOD PRESSURE: 74 MMHG

## 2017-03-28 DIAGNOSIS — I10 ESSENTIAL HYPERTENSION: Primary | Chronic | ICD-10-CM

## 2017-03-28 DIAGNOSIS — E78.5 HYPERLIPIDEMIA, UNSPECIFIED HYPERLIPIDEMIA TYPE: Chronic | ICD-10-CM

## 2017-03-28 DIAGNOSIS — I25.10 CORONARY ARTERY DISEASE INVOLVING NATIVE CORONARY ARTERY OF NATIVE HEART WITHOUT ANGINA PECTORIS: Chronic | ICD-10-CM

## 2017-03-28 DIAGNOSIS — G47.30 UNSPECIFIED SLEEP APNEA: ICD-10-CM

## 2017-03-28 DIAGNOSIS — E04.2 MULTINODULAR GOITER: ICD-10-CM

## 2017-03-28 PROCEDURE — 3077F SYST BP >= 140 MM HG: CPT | Mod: S$GLB,,, | Performed by: PHYSICIAN ASSISTANT

## 2017-03-28 PROCEDURE — 76536 US EXAM OF HEAD AND NECK: CPT | Mod: 26,,, | Performed by: RADIOLOGY

## 2017-03-28 PROCEDURE — 3078F DIAST BP <80 MM HG: CPT | Mod: S$GLB,,, | Performed by: PHYSICIAN ASSISTANT

## 2017-03-28 PROCEDURE — 99213 OFFICE O/P EST LOW 20 MIN: CPT | Mod: S$GLB,,, | Performed by: PHYSICIAN ASSISTANT

## 2017-03-28 PROCEDURE — 3046F HEMOGLOBIN A1C LEVEL >9.0%: CPT | Mod: S$GLB,,, | Performed by: PHYSICIAN ASSISTANT

## 2017-03-28 PROCEDURE — 76536 US EXAM OF HEAD AND NECK: CPT | Mod: TC,PO

## 2017-03-28 PROCEDURE — 99999 PR PBB SHADOW E&M-EST. PATIENT-LVL IV: CPT | Mod: PBBFAC,,, | Performed by: PHYSICIAN ASSISTANT

## 2017-03-28 PROCEDURE — 4010F ACE/ARB THERAPY RXD/TAKEN: CPT | Mod: S$GLB,,, | Performed by: PHYSICIAN ASSISTANT

## 2017-03-28 PROCEDURE — 1160F RVW MEDS BY RX/DR IN RCRD: CPT | Mod: S$GLB,,, | Performed by: PHYSICIAN ASSISTANT

## 2017-03-28 PROCEDURE — 2022F DILAT RTA XM EVC RTNOPTHY: CPT | Mod: S$GLB,,, | Performed by: PHYSICIAN ASSISTANT

## 2017-03-28 NOTE — PROGRESS NOTES
Subjective:    Patient ID:  Lydia Danielson is a 61 y.o. female who presents for follow-up of HTN    HPI   Ms. Danielson is a 61 year old female patient with a PMHx of HTN, DM, and hyperlipidemia who presents today for follow-up. Patient previously seen by Dr. Montez and started on HCTZ for better BP control. She returns today and states she is doing well. No cardiac complaints. Denies any chest pain, SOB, or other anginal signs or symptoms. No sign/symptoms of CHF. No lightheadedness, dizziness, palpitations, near syncope, or syncope. BP elevated today in office. Home monitor reviewed-similar readings-160's/90's at times. Patient states she has been trying to watch her salt intake. BMP from today pending.     Review of Systems   Constitution: Negative for chills, decreased appetite, fever, weakness and malaise/fatigue.   HENT: Negative for congestion, headaches, hoarse voice and sore throat.    Eyes: Negative for blurred vision and discharge.   Cardiovascular: Negative for chest pain, claudication, cyanosis, dyspnea on exertion, irregular heartbeat, leg swelling, near-syncope, orthopnea, palpitations and paroxysmal nocturnal dyspnea.   Respiratory: Negative for cough, hemoptysis, shortness of breath, snoring, sputum production and wheezing.    Endocrine: Negative for cold intolerance and heat intolerance.   Hematologic/Lymphatic: Negative for bleeding problem. Does not bruise/bleed easily.   Skin: Negative for rash.   Musculoskeletal: Negative for arthritis, back pain, joint pain, joint swelling, muscle cramps, muscle weakness and myalgias.   Gastrointestinal: Negative for abdominal pain, constipation, diarrhea, heartburn, melena and nausea.   Genitourinary: Negative for hematuria.   Neurological: Negative for dizziness, focal weakness, light-headedness, loss of balance, numbness, paresthesias and seizures.   Psychiatric/Behavioral: Negative for memory loss. The patient does not have insomnia.   "  Allergic/Immunologic: Negative for hives.     BP (!) 156/74  Pulse (!) 54  Ht 5' 5" (1.651 m)  Wt 69.1 kg (152 lb 5.4 oz)  BMI 25.35 kg/m2    Objective:    Physical Exam   Constitutional: She is oriented to person, place, and time. She appears well-developed and well-nourished. No distress.   HENT:   Head: Normocephalic and atraumatic.   Eyes: Pupils are equal, round, and reactive to light. Right eye exhibits no discharge. Left eye exhibits no discharge.   Neck: Neck supple. No JVD present. No tracheal deviation present. No thyromegaly present.   Cardiovascular: Normal rate, regular rhythm, S1 normal, S2 normal and normal heart sounds.  PMI is not displaced.  Exam reveals no gallop, no S3, no S4 and no friction rub.    No murmur heard.  Pulses:       Radial pulses are 2+ on the right side, and 2+ on the left side.   Pulmonary/Chest: Effort normal and breath sounds normal. No respiratory distress. She has no wheezes. She has no rales.   Abdominal: Soft. She exhibits no distension. There is no tenderness. There is no rebound.   Musculoskeletal: She exhibits no edema.   Neurological: She is alert and oriented to person, place, and time.   Skin: Skin is warm and dry. She is not diaphoretic. No erythema.   Psychiatric: She has a normal mood and affect. Her behavior is normal. Thought content normal.   Nursing note and vitals reviewed.          Chemistry        Component Value Date/Time     01/25/2017 1035    K 4.5 01/25/2017 1035     01/25/2017 1035    CO2 29 01/25/2017 1035    BUN 16 01/25/2017 1035    CREATININE 1.0 01/25/2017 1035     (H) 01/25/2017 1035        Component Value Date/Time    CALCIUM 10.4 01/25/2017 1035    ALKPHOS 71 01/25/2017 1035    AST 22 01/25/2017 1035    ALT 17 01/25/2017 1035    BILITOT 0.7 01/25/2017 1035        2D Echo CONCLUSIONS     1 - Mild left atrial enlargement.     2 - Concentric hypertrophy.     3 - Normal left ventricular systolic function (EF 55-60%).     4 - " Left ventricular diastolic dysfunction.     5 - Normal right ventricular systolic function .     6 - Trivial mitral regurgitation.     7 - Mild tricuspid regurgitation.       Assessment:       1. Essential hypertension    2. Coronary artery disease involving native coronary artery of native heart without angina pectoris    3. Type II or unspecified type diabetes mellitus without mention of complication, uncontrolled    4. Hyperlipidemia, unspecified hyperlipidemia type    5. Unspecified sleep apnea       Doing clinically well. BP improved but still elevated. Check BMP today, will change/add medication pending results. Low salt diet encouraged. Continue other meds.   Plan:     -BMP today with phone review  -Continue other meds  -Cardiac low salt diet  -Med changes pending labs results  -Follow-up TBA          Chart reviewed. Dr. Montez agrees with plan as outlined above.

## 2017-03-28 NOTE — MR AVS SNAPSHOT
[facility]  [address] [place] [address]  Phone: [phone]  Fax: [phone]                  Lydia Danielson   3/28/2017 9:30 AM   Office Visit    Description:  Female : 1955   Provider:  JESSIE Whitaker   Department:  [facility]           Reason for Visit     Coronary Artery Disease     Hypertension     Hyperlipidemia           Diagnoses this Visit        Comments    Essential hypertension    -  Primary     Coronary artery disease involving native coronary artery of native heart without angina pectoris         Type II or unspecified type diabetes mellitus without mention of complication, uncontrolled         Hyperlipidemia, unspecified hyperlipidemia type         Unspecified sleep apnea                To Do List           Future Appointments        Provider Department Dept Phone    3/28/2017 10:00 AM [facility] [phone]    2017 1:00 PM Mikayla Prescott MD [facility] [phone]    2017 10:45 AM Chelsea Bowen MD [facility] [phone]    5/15/2017 9:00 AM Rupesh Tucker MD [facility] [phone]      Goals (5 Years of Data)     None      [facility]     [facility] Nurse Care [facility] -  Assistance  Registered nurses in the [facility] provide clinical advisement, health education, appointment booking, and other advisory services.  Call for this free service at [phone].             Medications           Message regarding Medications     Verify the changes and/or additions to your medication regime listed below are the same as discussed with your clinician today.  If any of these changes or additions are incorrect, please notify your healthcare provider.             Verify that the below list of medications is an accurate representation of the medications you are currently taking.  If none reported, the list may be blank. If incorrect, please  "contact your healthcare provider. Carry this list with you in case of emergency.           Current Medications     amlodipine (NORVASC) 10 MG tablet Take 1 tablet (10 mg total) by mouth once daily.    atorvastatin (LIPITOR) 40 MG tablet TAKE 1 TABLET BY MOUTH EVERY NIGHT AT BEDTIME    benazepril (LOTENSIN) 40 MG tablet Take 40 mg by mouth once daily.    cyclobenzaprine (FLEXERIL) 10 MG tablet Take 1 tablet (10 mg total) by mouth nightly.    diazePAM (VALIUM) 2 MG tablet Take 1 tablet (2 mg total) by mouth once as needed for Anxiety (30 minutes prior to procedure).    fluticasone (FLONASE) 50 mcg/actuation nasal spray 2 Spray, Suspension Nasal Every day.  each nostril    furosemide (LASIX) 40 MG tablet TAKE 1 TABLET BY MOUTH EVERY DAY    glimepiride (AMARYL) 4 MG tablet TAKE 1 TABLET BY MOUTH TWICE DAILY    hydrochlorothiazide (HYDRODIURIL) 25 MG tablet Take 1 tablet (25 mg total) by mouth once daily.    ibuprofen (ADVIL,MOTRIN) 800 MG tablet Take by mouth. 1 Tablet Oral Twice a day .  with food    insulin glargine (TOUJEO SOLOSTAR) 300 unit/mL (1.5 mL) InPn Inject 10 Units into the skin every evening.    insulin needles, disposable, 32 x 5/32 " Ndle Use daily with Lantus    meloxicam (MOBIC) 15 MG tablet Take 1 tablet (15 mg total) by mouth once daily.    metformin (GLUCOPHAGE) 1000 MG tablet Take 1 tablet (1,000 mg total) by mouth 2 (two) times daily with meals.    metoprolol succinate (TOPROL-XL) 25 MG 24 hr tablet Take 25 mg by mouth once daily.    saxagliptin (ONGLYZA) 5 mg Tab tablet Take by mouth. 1 Tablet Oral Every day    sertraline (ZOLOFT) 50 MG tablet Take 1 tablet (50 mg total) by mouth once daily. For anxiety    WELCHOL 3.75 gram PwPk TAKE 1 PACKET BY MOUTH EVERY DAY AS DIRECTED           Clinical Reference Information           Your Vitals Were     BP Pulse Height Weight BMI    156/74 54 5' 5" (1.651 m) 69.1 kg (152 lb 5.4 oz) 25.35 kg/m2      Blood Pressure          Most Recent Value    BP  (!)  " 156/74      Allergies as of 3/28/2017     No Known Allergies      Immunizations Administered on Date of Encounter - 3/28/2017     None      Orders Placed During Today's Visit     Future Labs/Procedures Expected by Expires    Basic metabolic panel  3/28/2017 5/27/2018      Language Assistance Services     ATTENTION: Language assistance services are available, free of charge. Please call [phone].      ATENCIÓN: Si habla español, tiene a franks disposición servicios gratuitos de asistencia lingüística. Llame al [phone].     CHÚ Ý: N?u b?n nói Ti?ng Vi?t, có các d?ch v? h? tr? ngôn ng? mi?n phí dành cho b?n. G?i s? [phone].         [facility] complies with applicable Federal civil rights laws and does not discriminate on the basis of race, color, national origin, age, disability, or sex.

## 2017-03-29 ENCOUNTER — TELEPHONE (OUTPATIENT)
Dept: CARDIOLOGY | Facility: CLINIC | Age: 62
End: 2017-03-29

## 2017-03-29 DIAGNOSIS — I10 ESSENTIAL HYPERTENSION: Primary | ICD-10-CM

## 2017-03-29 RX ORDER — ISOSORBIDE DINITRATE AND HYDRALAZINE HYDROCHLORIDE 37.5; 2 MG/1; MG/1
1 TABLET ORAL 2 TIMES DAILY
Qty: 60 TABLET | Refills: 11 | Status: SHIPPED | OUTPATIENT
Start: 2017-03-29 | End: 2017-03-31 | Stop reason: SDUPTHER

## 2017-03-29 NOTE — TELEPHONE ENCOUNTER
----- Message from JESSIE Whitaker sent at 3/29/2017  2:41 PM CDT -----  Please phone patient. Discussed with Dr. Montez. Stop HCTZ. Start Bidil 1 pill BID. Needs f/u 2 weeks for BP check.     Thanks

## 2017-03-30 ENCOUNTER — PATIENT MESSAGE (OUTPATIENT)
Dept: OTOLARYNGOLOGY | Facility: CLINIC | Age: 62
End: 2017-03-30

## 2017-03-30 ENCOUNTER — TELEPHONE (OUTPATIENT)
Dept: FAMILY MEDICINE | Facility: CLINIC | Age: 62
End: 2017-03-30

## 2017-03-30 DIAGNOSIS — D35.1 PARATHYROID ADENOMA: Primary | ICD-10-CM

## 2017-03-30 RX ORDER — PROMETHAZINE HYDROCHLORIDE AND DEXTROMETHORPHAN HYDROBROMIDE 6.25; 15 MG/5ML; MG/5ML
SYRUP ORAL
Qty: 240 ML | Refills: 0 | Status: SHIPPED | OUTPATIENT
Start: 2017-03-30 | End: 2019-09-17

## 2017-03-30 NOTE — TELEPHONE ENCOUNTER
----- Message from Tatum Richard sent at 3/30/2017 10:42 AM CDT -----  Contact: self [phone]  States that she would like something called in for sinus issues. Pt uses     [facility] [id number] - [place] - [address] AT [facility]  [address] [place] [address]  Phone: [phone] Fax: [phone]    Please call back at [phone]//thank you acc

## 2017-03-30 NOTE — TELEPHONE ENCOUNTER
----- Message from Michael Stevens sent at 3/30/2017  1:37 PM CDT -----  Contact: pt  She's calling in regards to a missed call, please advise, [phone] (home)

## 2017-03-30 NOTE — TELEPHONE ENCOUNTER
Pt c/o non-productive cough, sneezing, post nasal drip, HA  Tried advil for the headache but nothing else.   States she does not have a way to get her for appt, would like something called in.

## 2017-03-31 ENCOUNTER — TELEPHONE (OUTPATIENT)
Dept: CARDIOLOGY | Facility: CLINIC | Age: 62
End: 2017-03-31

## 2017-03-31 DIAGNOSIS — I10 ESSENTIAL HYPERTENSION: ICD-10-CM

## 2017-03-31 RX ORDER — ISOSORBIDE DINITRATE AND HYDRALAZINE HYDROCHLORIDE 37.5; 2 MG/1; MG/1
1 TABLET ORAL 2 TIMES DAILY
Qty: 60 TABLET | Refills: 11 | Status: SHIPPED | OUTPATIENT
Start: 2017-03-31 | End: 2019-09-17 | Stop reason: SDUPTHER

## 2017-03-31 NOTE — TELEPHONE ENCOUNTER
The patient has been notified of this information and all questions answered.      Medication has been sent to Jenna for approval.

## 2017-03-31 NOTE — TELEPHONE ENCOUNTER
----- Message from JESSIE Whitaker sent at 3/30/2017  4:35 PM CDT -----  Please try to reach patient again    Thanks

## 2017-03-31 NOTE — TELEPHONE ENCOUNTER
Spoke with patient and verbally read message results as she had not read them yet. Patient verbalized understanding and scheduled labs for Monday 4/3/17.

## 2017-04-04 ENCOUNTER — LAB VISIT (OUTPATIENT)
Dept: LAB | Facility: HOSPITAL | Age: 62
End: 2017-04-04
Attending: OTOLARYNGOLOGY
Payer: COMMERCIAL

## 2017-04-04 DIAGNOSIS — D35.1 PARATHYROID ADENOMA: ICD-10-CM

## 2017-04-04 LAB
CA-I BLDV-SCNC: 1.34 MMOL/L
PTH-INTACT SERPL-MCNC: 71 PG/ML

## 2017-04-04 PROCEDURE — 82330 ASSAY OF CALCIUM: CPT

## 2017-04-04 PROCEDURE — 83970 ASSAY OF PARATHORMONE: CPT

## 2017-04-04 PROCEDURE — 36415 COLL VENOUS BLD VENIPUNCTURE: CPT | Mod: PO

## 2017-04-07 ENCOUNTER — PATIENT MESSAGE (OUTPATIENT)
Dept: OTOLARYNGOLOGY | Facility: CLINIC | Age: 62
End: 2017-04-07

## 2017-04-07 DIAGNOSIS — E04.2 MULTINODULAR GOITER: Primary | ICD-10-CM

## 2017-04-19 ENCOUNTER — HOSPITAL ENCOUNTER (OUTPATIENT)
Dept: RADIOLOGY | Facility: HOSPITAL | Age: 62
Discharge: HOME | End: 2017-04-19
Attending: OTOLARYNGOLOGY
Payer: COMMERCIAL

## 2017-04-19 DIAGNOSIS — E04.2 MULTINODULAR GOITER: ICD-10-CM

## 2017-04-19 PROCEDURE — 88173 CYTOPATH EVAL FNA REPORT: CPT | Performed by: PATHOLOGY

## 2017-04-19 PROCEDURE — 88173 CYTOPATH EVAL FNA REPORT: CPT | Mod: 26,,, | Performed by: PATHOLOGY

## 2017-04-19 PROCEDURE — 76942 ECHO GUIDE FOR BIOPSY: CPT | Mod: TC

## 2017-04-19 NOTE — DISCHARGE SUMMARY
Procedure was performed by the radiologist.  Sterile technique was performed in the anterior, lidocaine was used as a local anesthetic.  Multiple samples taken from the 3 nodules bilaterally.  Pt tolerated the procedure well without immediate complications.  Please see radiologist report for details. F/u with PCP and/or ordering physician.

## 2017-04-24 ENCOUNTER — PATIENT MESSAGE (OUTPATIENT)
Dept: OTOLARYNGOLOGY | Facility: CLINIC | Age: 62
End: 2017-04-24

## 2017-04-24 DIAGNOSIS — E04.2 MULTINODULAR GOITER: Primary | ICD-10-CM

## 2017-04-26 ENCOUNTER — OFFICE VISIT (OUTPATIENT)
Dept: FAMILY MEDICINE | Facility: CLINIC | Age: 62
End: 2017-04-26
Payer: COMMERCIAL

## 2017-04-26 ENCOUNTER — LAB VISIT (OUTPATIENT)
Dept: LAB | Facility: HOSPITAL | Age: 62
End: 2017-04-26
Attending: FAMILY MEDICINE
Payer: COMMERCIAL

## 2017-04-26 VITALS
HEIGHT: 65 IN | DIASTOLIC BLOOD PRESSURE: 96 MMHG | RESPIRATION RATE: 18 BRPM | SYSTOLIC BLOOD PRESSURE: 160 MMHG | WEIGHT: 156.75 LBS | HEART RATE: 60 BPM | BODY MASS INDEX: 26.12 KG/M2 | TEMPERATURE: 96 F

## 2017-04-26 DIAGNOSIS — I10 ESSENTIAL HYPERTENSION: Primary | ICD-10-CM

## 2017-04-26 DIAGNOSIS — Z79.4 UNCONTROLLED TYPE 2 DIABETES MELLITUS WITH HYPERGLYCEMIA, WITH LONG-TERM CURRENT USE OF INSULIN: ICD-10-CM

## 2017-04-26 DIAGNOSIS — E04.2 MULTINODULAR GOITER: ICD-10-CM

## 2017-04-26 DIAGNOSIS — E11.65 UNCONTROLLED TYPE 2 DIABETES MELLITUS WITH HYPERGLYCEMIA, WITH LONG-TERM CURRENT USE OF INSULIN: ICD-10-CM

## 2017-04-26 DIAGNOSIS — M50.30 DDD (DEGENERATIVE DISC DISEASE), CERVICAL: ICD-10-CM

## 2017-04-26 PROCEDURE — 99999 PR PBB SHADOW E&M-EST. PATIENT-LVL III: CPT | Mod: PBBFAC,,, | Performed by: FAMILY MEDICINE

## 2017-04-26 PROCEDURE — 4010F ACE/ARB THERAPY RXD/TAKEN: CPT | Mod: S$GLB,,, | Performed by: FAMILY MEDICINE

## 2017-04-26 PROCEDURE — 1160F RVW MEDS BY RX/DR IN RCRD: CPT | Mod: S$GLB,,, | Performed by: FAMILY MEDICINE

## 2017-04-26 PROCEDURE — 36415 COLL VENOUS BLD VENIPUNCTURE: CPT | Mod: PO

## 2017-04-26 PROCEDURE — 3077F SYST BP >= 140 MM HG: CPT | Mod: S$GLB,,, | Performed by: FAMILY MEDICINE

## 2017-04-26 PROCEDURE — 83036 HEMOGLOBIN GLYCOSYLATED A1C: CPT

## 2017-04-26 PROCEDURE — 99214 OFFICE O/P EST MOD 30 MIN: CPT | Mod: S$GLB,,, | Performed by: FAMILY MEDICINE

## 2017-04-26 PROCEDURE — 3080F DIAST BP >= 90 MM HG: CPT | Mod: S$GLB,,, | Performed by: FAMILY MEDICINE

## 2017-04-26 PROCEDURE — 3046F HEMOGLOBIN A1C LEVEL >9.0%: CPT | Mod: S$GLB,,, | Performed by: FAMILY MEDICINE

## 2017-04-26 NOTE — MR AVS SNAPSHOT
[facility]  [address] [place] [address]  Phone: [phone]                  Lydia Danielson   2017 10:45 AM   Office Visit    Description:  Female : 1955   Provider:  Chelsea Bowen MD   Department:  [facility]           Reason for Visit     Hypertension     Diabetes     Follow-up           Diagnoses this Visit        Comments    Essential hypertension    -  Primary     Uncontrolled type 2 diabetes mellitus with hyperglycemia, with long-term current use of insulin         Multinodular goiter         DDD (degenerative disc disease), cervical                To Do List           Future Appointments        Provider Department Dept Phone    2017 12:10 PM LABORATORY, [address] [facility] [phone]    2017 9:30 AM Richy Medina MD [facility] - [phone]    5/15/2017 9:00 AM Rupesh Tucker MD [facility] [phone]    2017 9:30 AM Chelsea Bowen MD [facility] [phone]    10/25/2017 9:30 AM [facility] [phone]      Goals (5 Years of Data)     None      Follow-Up and Disposition     Return in about 3 months (around 2017) for bp, dm f/u.      [facility]     [facility] Care [facility] -  Assistance  Unless otherwise directed by your provider, please contact [facility], our nurse care line that is available for  assistance.     Registered nurses in the [facility] provide: appointment scheduling, clinical advisement, health education, and other advisory services.  Call: [phone] (toll free)               Medications           Message regarding Medications     Verify the changes and/or additions to your medication regime listed below are the same as discussed with your clinician today.  If any of these changes or additions are incorrect, please notify your healthcare provider.       "       Verify that the below list of medications is an accurate representation of the medications you are currently taking.  If none reported, the list may be blank. If incorrect, please contact your healthcare provider. Carry this list with you in case of emergency.           Current Medications     atorvastatin (LIPITOR) 40 MG tablet TAKE 1 TABLET BY MOUTH EVERY NIGHT AT BEDTIME    benazepril (LOTENSIN) 40 MG tablet Take 40 mg by mouth once daily.    cyclobenzaprine (FLEXERIL) 10 MG tablet Take 1 tablet (10 mg total) by mouth nightly.    diazePAM (VALIUM) 2 MG tablet Take 1 tablet (2 mg total) by mouth once as needed for Anxiety (30 minutes prior to procedure).    fluticasone (FLONASE) 50 mcg/actuation nasal spray 2 Spray, Suspension Nasal Every day.  each nostril    furosemide (LASIX) 40 MG tablet TAKE 1 TABLET BY MOUTH EVERY DAY    glimepiride (AMARYL) 4 MG tablet TAKE 1 TABLET BY MOUTH TWICE DAILY    ibuprofen (ADVIL,MOTRIN) 800 MG tablet Take by mouth. 1 Tablet Oral Twice a day .  with food    insulin glargine (TOUJEO SOLOSTAR) 300 unit/mL (1.5 mL) InPn Inject 10 Units into the skin every evening.    insulin needles, disposable, 32 x 5/32 " Ndle Use daily with Lantus    isosorbide-hydrALAZINE 20-37.5 mg (BIDIL) 20-37.5 mg Tab Take 1 tablet by mouth 2 (two) times daily.    meloxicam (MOBIC) 15 MG tablet Take 1 tablet (15 mg total) by mouth once daily.    metformin (GLUCOPHAGE) 1000 MG tablet Take 1 tablet (1,000 mg total) by mouth 2 (two) times daily with meals.    metoprolol succinate (TOPROL-XL) 25 MG 24 hr tablet Take 25 mg by mouth once daily.    promethazine-dextromethorphan (PROMETHAZINE-DM) 6.25-15 mg/5 mL Syrp Take 1 teaspoon every 4 - 6 hours prn cough    saxagliptin (ONGLYZA) 5 mg Tab tablet Take by mouth. 1 Tablet Oral Every day    sertraline (ZOLOFT) 50 MG tablet Take 1 tablet (50 mg total) by mouth once daily. For anxiety    WELCHOL 3.75 gram PwPk TAKE 1 PACKET BY MOUTH EVERY DAY AS DIRECTED    " "amlodipine (NORVASC) 10 MG tablet Take 1 tablet (10 mg total) by mouth once daily.           Clinical Reference Information           Your Vitals Were     BP Pulse Temp Resp    160/96 (BP Location: Left arm, Patient Position: Sitting, BP Method: Manual) 60 95.9 °F (35.5 °C) (Tympanic) 18    Height Weight BMI    5' 5" (1.651 m) 71.1 kg (156 lb 12 oz) 26.08 kg/m2      Blood Pressure          Most Recent Value    BP  (!)  160/96      Allergies as of 4/26/2017     No Known Allergies      Immunizations Administered on Date of Encounter - 4/26/2017     None      Orders Placed During Today's Visit     Future Labs/Procedures Expected by Expires    Hemoglobin A1c  4/26/2017 4/26/2018      Instructions    Please call Dr. Bowen for your results.     Thanks.         Language Assistance Services     ATTENTION: Language assistance services are available, free of charge. Please call [phone].      ATENCIÓN: Si habla español, tiene a franks disposición servicios gratuitos de asistencia lingüística. Llame al [phone].     CHÚ Ý: N?u b?n nói Ti?ng Vi?t, có các d?ch v? h? tr? ngôn ng? mi?n phí dành cho b?n. G?i s? [phone].         [facility] complies with applicable Federal civil rights laws and does not discriminate on the basis of race, color, national origin, age, disability, or sex.        "

## 2017-04-26 NOTE — PROGRESS NOTES
Subjective:       Patient ID: Lydia Danielson is a 61 y.o. female.    Chief Complaint: Hypertension; Diabetes; and Follow-up    HPI Comments: Ms. Danielson comes in today for 3-month diabetes and hypertension follow-up.  She has taken blood pressure medication today but states she has more to take later.  She states she is fasting today and as a result has headache at this time.  She states she exercises very little but monitors her diet.    She states she performs home glucose checks with levels ranging 300-400's.  She states she cannot take metformin as it makes her feel sick (tired with nausea and vomiting).  She states she has not been taking amlodipine 10 mg daily as she states she was not aware she was to continue taking it following her visit with cardiology in February 2017.    She denies having fever, chills, fatigue, appetite change; shortness of breath, cough, wheezing; chest pain, palpitations, leg swelling; abdominal pain, nausea, vomiting, diarrhea, constipation; polydipsia, polyuria, polyphagia; unusual urinary symptoms; depression or anxiety.  She reports having rare neck pain but also reports having back pain today.    She saw cardiologist Dr. Montez on February 10, 2017 for hypertension, coronary artery disease surveillance and was prescribed by deal to take twice daily in addition to taking other blood pressure medications.  She had follow-up with JESSIE Rubio on March 28, 2017 with elevated blood pressure.    She saw ENT Dr. Medina on March 24, 2017 for multinodular goiter and subsequently had FNA performed on April 19, 2017 which was benign.  He advised her to follow-up in 6 months for thyroid ultrasound.      Current Outpatient Prescriptions:  atorvastatin (LIPITOR) 40 MG tablet, TAKE 1 TABLET BY MOUTH EVERY NIGHT AT BEDTIME  benazepril (LOTENSIN) 40 MG tablet, Take 40 mg by mouth once daily.  cyclobenzaprine (FLEXERIL) 10 MG tablet, Take 1 tablet (10 mg total) by mouth nightly.  diazePAM  "(VALIUM) 2 MG tablet, Take 1 tablet (2 mg total) by mouth once as needed for Anxiety (30 minutes prior to procedure).  fluticasone (FLONASE) 50 mcg/actuation nasal spray, 2 Spray, Suspension Nasal Every day.  each nostril  furosemide (LASIX) 40 MG tablet, TAKE 1 TABLET BY MOUTH EVERY DAY  glimepiride (AMARYL) 4 MG tablet, TAKE 1 TABLET BY MOUTH TWICE DAILY  ibuprofen (ADVIL,MOTRIN) 800 MG tablet, Take by mouth. 1 Tablet Oral Twice a day .  with food  insulin glargine (TOUJEO SOLOSTAR) 300 unit/mL (1.5 mL) InPn, Inject 10 Units into the skin every evening.  insulin needles, disposable, 32 x 5/32 " Ndle, Use daily with Lantus  isosorbide-hydrALAZINE 20-37.5 mg (BIDIL) 20-37.5 mg Tab, Take 1 tablet by mouth 2 (two) times daily.  meloxicam (MOBIC) 15 MG tablet, Take 1 tablet (15 mg total) by mouth once daily.  metformin (GLUCOPHAGE) 1000 MG tablet, Take 1 tablet (1,000 mg total) by mouth 2 (two) times daily with meals.  metoprolol succinate (TOPROL-XL) 25 MG 24 hr tablet, Take 25 mg by mouth once daily.  promethazine-dextromethorphan (PROMETHAZINE-DM) 6.25-15 mg/5 mL Syrp, Take 1 teaspoon every 4 - 6 hours prn cough  saxagliptin (ONGLYZA) 5 mg Tab tablet, Take by mouth. 1 Tablet Oral Every day  sertraline (ZOLOFT) 50 MG tablet, Take 1 tablet (50 mg total) by mouth once daily. For anxiety  WELCHOL 3.75 gram PwPk, TAKE 1 PACKET BY MOUTH EVERY DAY AS DIRECTED  amlodipine (NORVASC) 10 MG tablet, Take 1 tablet (10 mg total) by mouth once daily.      Labs:                  WBC                      4.81                01/25/2017                 HGB                      12.8                01/25/2017                 HCT                      36.6 (L)            01/25/2017                 PLT                      371 (H)             01/25/2017              LDLCALC                  100.8               01/25/2017                 LDLCALC                  100.8               01/25/2017                          CHOL                    "  171                 01/25/2017                 CHOL                     171                 01/25/2017                 TRIG                     56                  01/25/2017                 TRIG                     56                  01/25/2017                 HDL                      59                  01/25/2017                 HDL                      59                  01/25/2017                 ALT                      17                  01/25/2017                 AST                      22                  01/25/2017                 NA                       137                 03/28/2017                 K                        4.9                 03/28/2017                 CL                       101                 03/28/2017                 CREATININE               1.2                 03/28/2017                 BUN                      19                  03/28/2017                 CO2                      25                  03/28/2017                 TSH                      0.387 (L)           01/25/2017                 HGBA1C                   9.8 (H)             01/25/2017                 HGBA1C                   9.8 (H)             01/25/2017              Hypertension   Associated symptoms include headaches and neck pain. Pertinent negatives include no chest pain, palpitations or shortness of breath.   Diabetes   Hypoglycemia symptoms include headaches. Pertinent negatives for hypoglycemia include no nervousness/anxiousness. Pertinent negatives for diabetes include no chest pain, no fatigue, no polydipsia, no polyphagia and no polyuria.     Review of Systems   Constitutional: Negative for activity change, appetite change, chills, fatigue and fever.        Weight 72.3 kg (159 lb 6.3 oz) at January 25, 2017 visit.   Respiratory: Negative for cough, shortness of breath and wheezing.    Cardiovascular: Negative for chest pain, palpitations and leg swelling.        See history of present illness.    Gastrointestinal: Negative for abdominal pain, constipation, diarrhea, nausea and vomiting.   Endocrine: Negative for polydipsia, polyphagia and polyuria.        See history of present illness.   Genitourinary: Negative for difficulty urinating.   Musculoskeletal: Positive for back pain and neck pain.   Neurological: Positive for headaches.        Positive for tingling into arms.   Psychiatric/Behavioral: Negative for dysphoric mood. The patient is not nervous/anxious.        Objective:      Physical Exam   Constitutional: She is oriented to person, place, and time. She appears well-developed and well-nourished. No distress.   Pleasant.   Neck: Normal range of motion. Neck supple. No thyromegaly present.   Cardiovascular: Normal rate, regular rhythm, normal heart sounds and intact distal pulses.    No murmur heard.  Pulses:       Dorsalis pedis pulses are 3+ on the right side, and 3+ on the left side.        Posterior tibial pulses are 3+ on the right side, and 3+ on the left side.   Pulmonary/Chest: Effort normal and breath sounds normal. No respiratory distress. She has no wheezes.   Abdominal: Soft. Bowel sounds are normal. She exhibits no distension and no mass. There is no tenderness. There is no rebound and no guarding.   Musculoskeletal: Normal range of motion. She exhibits tenderness. She exhibits no edema.   Feet look okay without ulcerations or skin breaks.  She is ambulatory without problems. Slight tender neck pain with full range of motion noted. Non tender back but stiff with movement.   Feet:   Right Foot:   Protective Sensation: 5 sites tested. 5 sites sensed.   Skin Integrity: Negative for ulcer or skin breakdown.   Left Foot:   Protective Sensation: 5 sites tested. 5 sites sensed.   Skin Integrity: Negative for ulcer or skin breakdown.   Lymphadenopathy:     She has no cervical adenopathy.   Neurological: She is alert and oriented to person, place, and time. She has normal reflexes.   Skin: She  is not diaphoretic.   Psychiatric: She has a normal mood and affect. Her behavior is normal. Judgment and thought content normal.   Vitals reviewed.      Assessment:       1. Essential hypertension    2. Uncontrolled type 2 diabetes mellitus with hyperglycemia, with long-term current use of insulin    3. Multinodular goiter    4. DDD (degenerative disc disease), cervical        Plan:       1.  Labs:  A1c.  Patient advised to call for results.  2.  Continue current medications (including take Amlodipine 10 mg daily, increase Toujeo 20 units daily, but stop Metformin), follow low sodium, low cholesterol, low carb diet, daily walks.  3.  Keep follow up with specialists.  4.  Flu shot this fall.  5.  See me in 3 months for diabetes and hypertension follow up.

## 2017-04-27 LAB
ESTIMATED AVG GLUCOSE: 266 MG/DL
HBA1C MFR BLD HPLC: 10.9 %

## 2017-04-27 RX ORDER — INSULIN GLARGINE 300 [IU]/ML
20 INJECTION, SOLUTION SUBCUTANEOUS NIGHTLY
Qty: 9 ML | Refills: 1 | Status: SHIPPED | OUTPATIENT
Start: 2017-04-27 | End: 2017-08-08 | Stop reason: SDUPTHER

## 2017-04-27 RX ORDER — BENAZEPRIL HYDROCHLORIDE 40 MG/1
TABLET ORAL
Qty: 90 TABLET | Refills: 1 | Status: SHIPPED | OUTPATIENT
Start: 2017-04-27 | End: 2017-12-04 | Stop reason: SDUPTHER

## 2017-05-15 RX ORDER — MELOXICAM 15 MG/1
TABLET ORAL
Qty: 90 TABLET | Refills: 1 | OUTPATIENT
Start: 2017-05-15

## 2017-06-02 NOTE — TELEPHONE ENCOUNTER
----- Message from Ivory Portillo sent at 6/2/2017 10:18 AM CDT -----  Contact: pt  Pt states she needs a refill on diabetic strips, pt can be reached at [phone]///[id number]

## 2017-07-11 ENCOUNTER — PATIENT OUTREACH (OUTPATIENT)
Dept: ADMINISTRATIVE | Facility: HOSPITAL | Age: 62
End: 2017-07-11

## 2017-07-26 ENCOUNTER — OFFICE VISIT (OUTPATIENT)
Dept: FAMILY MEDICINE | Facility: CLINIC | Age: 62
End: 2017-07-26
Payer: COMMERCIAL

## 2017-07-26 ENCOUNTER — LAB VISIT (OUTPATIENT)
Dept: LAB | Facility: HOSPITAL | Age: 62
End: 2017-07-26
Attending: FAMILY MEDICINE
Payer: COMMERCIAL

## 2017-07-26 VITALS
BODY MASS INDEX: 26.3 KG/M2 | SYSTOLIC BLOOD PRESSURE: 140 MMHG | HEIGHT: 65 IN | OXYGEN SATURATION: 98 % | DIASTOLIC BLOOD PRESSURE: 90 MMHG | WEIGHT: 157.88 LBS | TEMPERATURE: 96 F | RESPIRATION RATE: 18 BRPM | HEART RATE: 60 BPM

## 2017-07-26 DIAGNOSIS — Z79.4 UNCONTROLLED TYPE 2 DIABETES MELLITUS WITH HYPERGLYCEMIA, WITH LONG-TERM CURRENT USE OF INSULIN: ICD-10-CM

## 2017-07-26 DIAGNOSIS — E78.5 HYPERLIPIDEMIA, UNSPECIFIED HYPERLIPIDEMIA TYPE: ICD-10-CM

## 2017-07-26 DIAGNOSIS — Z12.31 OTHER SCREENING MAMMOGRAM: ICD-10-CM

## 2017-07-26 DIAGNOSIS — Z78.0 POSTMENOPAUSAL: ICD-10-CM

## 2017-07-26 DIAGNOSIS — I10 ESSENTIAL HYPERTENSION: Primary | ICD-10-CM

## 2017-07-26 DIAGNOSIS — I25.10 CORONARY ARTERY DISEASE INVOLVING NATIVE CORONARY ARTERY OF NATIVE HEART WITHOUT ANGINA PECTORIS: ICD-10-CM

## 2017-07-26 DIAGNOSIS — E11.65 UNCONTROLLED TYPE 2 DIABETES MELLITUS WITH HYPERGLYCEMIA, WITH LONG-TERM CURRENT USE OF INSULIN: ICD-10-CM

## 2017-07-26 DIAGNOSIS — R21 RASH: ICD-10-CM

## 2017-07-26 DIAGNOSIS — E04.2 MULTINODULAR GOITER: ICD-10-CM

## 2017-07-26 LAB
ANION GAP SERPL CALC-SCNC: 13 MMOL/L
BUN SERPL-MCNC: 17 MG/DL
CALCIUM SERPL-MCNC: 10.9 MG/DL
CHLORIDE SERPL-SCNC: 102 MMOL/L
CO2 SERPL-SCNC: 26 MMOL/L
CREAT SERPL-MCNC: 1.1 MG/DL
EST. GFR  (AFRICAN AMERICAN): >60 ML/MIN/1.73 M^2
EST. GFR  (NON AFRICAN AMERICAN): 53.9 ML/MIN/1.73 M^2
GLUCOSE SERPL-MCNC: 122 MG/DL
POTASSIUM SERPL-SCNC: 4.4 MMOL/L
SODIUM SERPL-SCNC: 141 MMOL/L

## 2017-07-26 PROCEDURE — 83036 HEMOGLOBIN GLYCOSYLATED A1C: CPT

## 2017-07-26 PROCEDURE — 4010F ACE/ARB THERAPY RXD/TAKEN: CPT | Mod: S$GLB,,, | Performed by: FAMILY MEDICINE

## 2017-07-26 PROCEDURE — 80048 BASIC METABOLIC PNL TOTAL CA: CPT

## 2017-07-26 PROCEDURE — 99999 PR PBB SHADOW E&M-EST. PATIENT-LVL IV: CPT | Mod: PBBFAC,,, | Performed by: FAMILY MEDICINE

## 2017-07-26 PROCEDURE — 36415 COLL VENOUS BLD VENIPUNCTURE: CPT | Mod: PO

## 2017-07-26 PROCEDURE — 99214 OFFICE O/P EST MOD 30 MIN: CPT | Mod: S$GLB,,, | Performed by: FAMILY MEDICINE

## 2017-07-26 PROCEDURE — 3046F HEMOGLOBIN A1C LEVEL >9.0%: CPT | Mod: S$GLB,,, | Performed by: FAMILY MEDICINE

## 2017-07-26 RX ORDER — HYDROCHLOROTHIAZIDE 25 MG/1
25 TABLET ORAL DAILY
COMMUNITY
Start: 2017-07-21 | End: 2017-12-04 | Stop reason: SDUPTHER

## 2017-07-26 RX ORDER — TRIAMCINOLONE ACETONIDE 1 MG/G
CREAM TOPICAL 2 TIMES DAILY PRN
Qty: 15 G | Refills: 0 | Status: SHIPPED | OUTPATIENT
Start: 2017-07-26 | End: 2020-05-18

## 2017-07-26 NOTE — PROGRESS NOTES
Subjective:       Patient ID: Lydia Danielson is a 61 y.o. female.    Chief Complaint: Hypertension; Diabetes; and Follow-up    Ms. Danielson comes in today for hypertension and diabetes follow-up.  She is fasting and has not taken medications today.  She states she exercises very little but monitors her diet.    She states she performs home glucose checks with levels ranging 120-220's in the morning and 's at night.    She reports having intermittent itchy bumps on her arms for one week.  She states the bumps go away but returned.  She states she used new soap Dove 2 weeks ago then returned to using Dial but states she still has itching.  Otherwise, she reports not using other new products.  She denies known bites.  She states she's been applying Neosporin without help.    Otherwise, she denies having fever, chills, fatigue, appetite change; shortness of breath, cough, wheezing; chest pain, palpitations, leg swelling; abdominal pain, nausea, vomiting, diarrhea, constipation; polydipsia, polyuria, polyphagia; unusual urinary symptoms; back pain; headaches; anxiety or depression.     She saw cardiologist Dr. Montez on February 10, 2017 for hypertension, coronary artery disease surveillance and was prescribed by deal to take twice daily in addition to taking other blood pressure medications.  She had follow-up with JESSIE Rubio on March 28, 2017 with elevated blood pressure.     She saw ENT Dr. Medina on March 24, 2017 for multinodular goiter and subsequently had FNA performed on April 19, 2017 which was benign.  He advised her to follow-up in 6 months for thyroid ultrasound.    Current Outpatient Prescriptions:  amlodipine (NORVASC) 10 MG tablet, Take 1 tablet (10 mg total) by mouth once daily.  atorvastatin (LIPITOR) 40 MG tablet, TAKE 1 TABLET BY MOUTH EVERY NIGHT AT BEDTIME  benazepril (LOTENSIN) 40 MG tablet, TAKE 1 TABLET BY MOUTH EVERY DAY  blood sugar diagnostic (ONETOUCH ULTRA TEST) Strp, 1 strip by  "Misc.(Non-Drug; Combo Route) route 2 (two) times daily.  cyclobenzaprine (FLEXERIL) 10 MG tablet, Take 1 tablet (10 mg total) by mouth nightly.  diazePAM (VALIUM) 2 MG tablet, Take 1 tablet (2 mg total) by mouth once as needed for Anxiety (30 minutes prior to procedure).  fluticasone (FLONASE) 50 mcg/actuation nasal spray, 2 Spray, Suspension Nasal Every day.  each nostril  furosemide (LASIX) 40 MG tablet, TAKE 1 TABLET BY MOUTH EVERY DAY  glimepiride (AMARYL) 4 MG tablet, TAKE 1 TABLET BY MOUTH TWICE DAILY  hydrochlorothiazide (HYDRODIURIL) 25 MG tablet, Take 25 mg by mouth once daily.   ibuprofen (ADVIL,MOTRIN) 800 MG tablet, Take by mouth. 1 Tablet Oral Twice a day .  with food  insulin glargine, TOUJEO, (TOUJEO SOLOSTAR) 300 unit/mL (1.5 mL) InPn pen, Inject 20 Units into the skin every evening.  insulin needles, disposable, 32 x 5/32 " Ndle, Use daily with Lantus  isosorbide-hydrALAZINE 20-37.5 mg (BIDIL) 20-37.5 mg Tab, Take 1 tablet by mouth 2 (two) times daily.  meloxicam (MOBIC) 15 MG tablet, Take 1 tablet (15 mg total) by mouth once daily. (Patient taking differently: Take 15 mg by mouth daily as needed. )  metoprolol succinate (TOPROL-XL) 25 MG 24 hr tablet, Take 25 mg by mouth once daily.  promethazine-dextromethorphan (PROMETHAZINE-DM) 6.25-15 mg/5 mL Syrp, Take 1 teaspoon every 4 - 6 hours prn cough  saxagliptin (ONGLYZA) 5 mg Tab tablet, Take by mouth. 1 Tablet Oral Every day  sertraline (ZOLOFT) 50 MG tablet, Take 1 tablet (50 mg total) by mouth once daily. For anxiety  WELCHOL 3.75 gram PwPk, TAKE 1 PACKET BY MOUTH EVERY DAY AS DIRECTED      Labs:                 WBC                      4.81                01/25/2017                 HGB                      12.8                01/25/2017                 HCT                      36.6 (L)            01/25/2017                 PLT                      371 (H)             01/25/2017                LDLCALC                  100.8               01/25/2017 "                          CHOL                     171                 01/25/2017                TRIG                     56                  01/25/2017                HDL                      59                  01/25/2017                 AST                      22                  01/25/2017                 NA                       137                 03/28/2017                 K                        4.9                 03/28/2017                 CL                       101                 03/28/2017                 CREATININE               1.2                 03/28/2017                 BUN                      19                  03/28/2017                 CO2                      25                  03/28/2017                 TSH                      0.387 (L)           01/25/2017                 HGBA1C                   10.9 (H)            04/26/2017                Hypertension   Pertinent negatives include no chest pain, headaches, palpitations or shortness of breath.   Diabetes   Pertinent negatives for hypoglycemia include no headaches or nervousness/anxiousness. Pertinent negatives for diabetes include no chest pain, no fatigue, no polydipsia, no polyphagia and no polyuria.     Review of Systems   Constitutional: Negative for activity change, appetite change, chills, fatigue and fever.        Weight 71.1 kg (156 lb 12 oz) at April 26, 2017 visit.   Respiratory: Negative for cough, shortness of breath and wheezing.    Cardiovascular: Negative for chest pain, palpitations and leg swelling.        See history of present illness.   Gastrointestinal: Negative for abdominal pain, constipation, diarrhea, nausea and vomiting.   Endocrine: Negative for polydipsia, polyphagia and polyuria.        See history of present illness.   Genitourinary: Negative for difficulty urinating.   Musculoskeletal: Negative for back pain.   Skin: Positive for rash.   Neurological: Negative for headaches.   Psychiatric/Behavioral:  Negative for dysphoric mood. The patient is not nervous/anxious.        Objective:      Physical Exam   Constitutional: She is oriented to person, place, and time. She appears well-developed and well-nourished. No distress.   Pleasant.   Neck: Normal range of motion. Neck supple. No thyromegaly present.   Cardiovascular: Normal rate, regular rhythm, normal heart sounds and intact distal pulses.    No murmur heard.  Pulses:       Dorsalis pedis pulses are 3+ on the right side, and 3+ on the left side.        Posterior tibial pulses are 3+ on the right side, and 3+ on the left side.   Pulmonary/Chest: Effort normal and breath sounds normal. No respiratory distress. She has no wheezes.   Abdominal: Soft. Bowel sounds are normal. She exhibits no distension and no mass. There is no tenderness. There is no rebound and no guarding.   Musculoskeletal: Normal range of motion. She exhibits no edema or tenderness.   Feet look okay without ulcerations or skin breaks.  She is ambulatory without problems.    Feet:   Right Foot:   Protective Sensation: 5 sites tested. 5 sites sensed.   Skin Integrity: Negative for ulcer or skin breakdown.   Left Foot:   Protective Sensation: 5 sites tested. 5 sites sensed.   Skin Integrity: Negative for ulcer or skin breakdown.   Lymphadenopathy:     She has no cervical adenopathy.   Neurological: She is alert and oriented to person, place, and time. She has normal reflexes.   Skin: She is not diaphoretic.   Small bumps at arms - 1 at each arm without drainage or tenderness noted.   Psychiatric: She has a normal mood and affect. Her behavior is normal. Judgment and thought content normal.   Vitals reviewed.      Assessment:       1. Essential hypertension    2. Uncontrolled type 2 diabetes mellitus with hyperglycemia, with long-term current use of insulin    3. Multinodular goiter    4. Hyperlipidemia, unspecified hyperlipidemia type    5. Coronary artery disease involving native coronary artery  of native heart without angina pectoris    6. Rash    7. Postmenopausal    8. Other screening mammogram        Plan:       1.  Labs:  BMP, A1c.  Patient advised to call for results.  2.  Continue current medications, follow low sodium, low cholesterol, low carb diet, daily walks.  3.  Keep follow up with specialists.  4.  Triamcinolone acetonide 0.1% cream - apply twice daily prn, #15 gram, 0 refill.  5.  Flu shot this fall.  6.  See me in 3 months for diabetes and hypertension follow up.

## 2017-07-27 ENCOUNTER — TELEPHONE (OUTPATIENT)
Dept: FAMILY MEDICINE | Facility: CLINIC | Age: 62
End: 2017-07-27

## 2017-07-27 LAB
ESTIMATED AVG GLUCOSE: 226 MG/DL
HBA1C MFR BLD HPLC: 9.5 %

## 2017-07-27 NOTE — TELEPHONE ENCOUNTER
----- Message from Julisa Gleason sent at 7/27/2017  4:35 PM CDT -----  Contact: Patient  Patient called for results. She can be contacted at [phone].    Thanks,  Julisa

## 2017-07-27 NOTE — TELEPHONE ENCOUNTER
Advise pt diabetes level shows little improvement, slightly high calcium level but kidney function has improved.  Continue current medications but increase Toujeo to 26 units daily. Thanks for call.

## 2017-07-28 NOTE — TELEPHONE ENCOUNTER
Spoke with patient notified her of results and to continue taking medication also to increased in Toujeo to 26 units daily. Patient verbally understand.

## 2017-08-01 ENCOUNTER — TELEPHONE (OUTPATIENT)
Dept: FAMILY MEDICINE | Facility: CLINIC | Age: 62
End: 2017-08-01

## 2017-08-01 RX ORDER — HYDROCORTISONE VALERATE CREAM 2 MG/G
CREAM TOPICAL 2 TIMES DAILY PRN
Qty: 15 G | Refills: 0 | Status: SHIPPED | OUTPATIENT
Start: 2017-08-01 | End: 2019-12-09

## 2017-08-01 NOTE — TELEPHONE ENCOUNTER
Stop TAC. Try Westcort - apply twice daily prn.  If not helpful, needs to see Dermatologist. Thanks.

## 2017-08-01 NOTE — TELEPHONE ENCOUNTER
Patient stated that the cream you prescribed her for itching do work. She would like to know can you call in something else to her pharmacy.

## 2017-08-01 NOTE — TELEPHONE ENCOUNTER
----- Message from Grecia Keller sent at 8/1/2017  8:32 AM CDT -----  Contact: [phone]  Patient would like a different creme for he itching because the current medication is not working. Please call back at [phone].    Pt uses:  [facility] [id number] - [place] - [address] AT [facility]  [address] [place] [address]  Phone: [phone] Fax: [phone]      Thanks,  Grecia Keller

## 2017-08-08 RX ORDER — INSULIN GLARGINE 300 [IU]/ML
26 INJECTION, SOLUTION SUBCUTANEOUS NIGHTLY
Qty: 9 ML | Refills: 1 | Status: SHIPPED | OUTPATIENT
Start: 2017-08-08 | End: 2019-10-28 | Stop reason: SDUPTHER

## 2017-08-08 NOTE — TELEPHONE ENCOUNTER
----- Message from Tere Prajapati sent at 8/8/2017  9:51 AM CDT -----  Contact: patient  1. What is the name of the medication you are requesting? Insulin Toujoe  2. What is the dose? 26 units  3. How do you take the medication? Orally, topically, etc? Injection  4. How often do you take this medication? 1 daily  5. Do you need a 30 day or 90 day supply? 30 day  6. How many refills are you requesting? 1  7. What is your preferred pharmacy and location of the pharmacy?   [facility] [id number]  [place] - [address] AT [facility]  [address] [place] [address]  Phone: [phone] Fax: [phone]      8. Who can we contact with further questions? Patient @ [phone].     Thanks, radha

## 2017-08-25 RX ORDER — AMLODIPINE BESYLATE 10 MG/1
10 TABLET ORAL DAILY
Qty: 90 TABLET | Refills: 1 | Status: SHIPPED | OUTPATIENT
Start: 2017-08-25 | End: 2017-08-25 | Stop reason: SDUPTHER

## 2017-08-28 RX ORDER — AMLODIPINE BESYLATE 10 MG/1
TABLET ORAL
Qty: 90 TABLET | Refills: 1 | Status: SHIPPED | OUTPATIENT
Start: 2017-08-28 | End: 2017-12-04 | Stop reason: SDUPTHER

## 2017-10-11 ENCOUNTER — PATIENT OUTREACH (OUTPATIENT)
Dept: ADMINISTRATIVE | Facility: HOSPITAL | Age: 62
End: 2017-10-11

## 2017-10-24 ENCOUNTER — TELEPHONE (OUTPATIENT)
Dept: RADIOLOGY | Facility: HOSPITAL | Age: 62
End: 2017-10-24

## 2017-12-04 ENCOUNTER — HOSPITAL ENCOUNTER (EMERGENCY)
Facility: HOSPITAL | Age: 62
Discharge: HOME | End: 2017-12-04
Attending: EMERGENCY MEDICINE
Payer: MEDICAID

## 2017-12-04 VITALS
HEIGHT: 65 IN | RESPIRATION RATE: 20 BRPM | BODY MASS INDEX: 25.33 KG/M2 | SYSTOLIC BLOOD PRESSURE: 137 MMHG | DIASTOLIC BLOOD PRESSURE: 76 MMHG | TEMPERATURE: 98 F | OXYGEN SATURATION: 100 % | WEIGHT: 152 LBS | HEART RATE: 85 BPM

## 2017-12-04 DIAGNOSIS — R05.9 COUGH: ICD-10-CM

## 2017-12-04 DIAGNOSIS — R51.9 SINUS HEADACHE: Primary | ICD-10-CM

## 2017-12-04 DIAGNOSIS — I10 HTN (HYPERTENSION): ICD-10-CM

## 2017-12-04 LAB
ALBUMIN SERPL BCP-MCNC: 4.1 G/DL
ALP SERPL-CCNC: 87 U/L
ALT SERPL W/O P-5'-P-CCNC: 40 U/L
ANION GAP SERPL CALC-SCNC: 9 MMOL/L
AST SERPL-CCNC: 20 U/L
BACTERIA #/AREA URNS HPF: NORMAL /HPF
BASOPHILS # BLD AUTO: 0.01 K/UL
BASOPHILS NFR BLD: 0.1 %
BILIRUB SERPL-MCNC: 0.5 MG/DL
BILIRUB UR QL STRIP: NEGATIVE
BNP SERPL-MCNC: <10 PG/ML
BUN SERPL-MCNC: 21 MG/DL
CALCIUM SERPL-MCNC: 11.3 MG/DL
CHLORIDE SERPL-SCNC: 98 MMOL/L
CLARITY UR: CLEAR
CO2 SERPL-SCNC: 30 MMOL/L
COLOR UR: YELLOW
CREAT SERPL-MCNC: 1.3 MG/DL
DIFFERENTIAL METHOD: ABNORMAL
EOSINOPHIL # BLD AUTO: 0.2 K/UL
EOSINOPHIL NFR BLD: 2.7 %
ERYTHROCYTE [DISTWIDTH] IN BLOOD BY AUTOMATED COUNT: 12.7 %
EST. GFR  (AFRICAN AMERICAN): 50 ML/MIN/1.73 M^2
EST. GFR  (NON AFRICAN AMERICAN): 44 ML/MIN/1.73 M^2
FLUAV AG SPEC QL IA: NEGATIVE
FLUBV AG SPEC QL IA: NEGATIVE
GLUCOSE SERPL-MCNC: 273 MG/DL
GLUCOSE UR QL STRIP: ABNORMAL
HCT VFR BLD AUTO: 41.3 %
HGB BLD-MCNC: 14.7 G/DL
HGB UR QL STRIP: NEGATIVE
INR PPP: 1
KETONES UR QL STRIP: NEGATIVE
LEUKOCYTE ESTERASE UR QL STRIP: NEGATIVE
LYMPHOCYTES # BLD AUTO: 2.9 K/UL
LYMPHOCYTES NFR BLD: 41.9 %
MCH RBC QN AUTO: 30.6 PG
MCHC RBC AUTO-ENTMCNC: 35.6 G/DL
MCV RBC AUTO: 86 FL
MICROSCOPIC COMMENT: NORMAL
MONOCYTES # BLD AUTO: 0.4 K/UL
MONOCYTES NFR BLD: 5.6 %
NEUTROPHILS # BLD AUTO: 3.5 K/UL
NEUTROPHILS NFR BLD: 49.7 %
NITRITE UR QL STRIP: NEGATIVE
PH UR STRIP: 5 [PH] (ref 5–8)
PLATELET # BLD AUTO: 408 K/UL
PMV BLD AUTO: 10.1 FL
POCT GLUCOSE: 325 MG/DL (ref 70–110)
POTASSIUM SERPL-SCNC: 4.1 MMOL/L
PROT SERPL-MCNC: 8.8 G/DL
PROT UR QL STRIP: NEGATIVE
PROTHROMBIN TIME: 10 SEC
RBC # BLD AUTO: 4.81 M/UL
RBC #/AREA URNS HPF: 1 /HPF (ref 0–4)
SODIUM SERPL-SCNC: 137 MMOL/L
SP GR UR STRIP: 1.02 (ref 1–1.03)
SPECIMEN SOURCE: NORMAL
URN SPEC COLLECT METH UR: ABNORMAL
UROBILINOGEN UR STRIP-ACNC: NEGATIVE EU/DL
WBC # BLD AUTO: 6.95 K/UL
WBC #/AREA URNS HPF: 2 /HPF (ref 0–5)
YEAST URNS QL MICRO: NORMAL

## 2017-12-04 PROCEDURE — 25000003 PHARM REV CODE 250: Performed by: EMERGENCY MEDICINE

## 2017-12-04 PROCEDURE — 99285 EMERGENCY DEPT VISIT HI MDM: CPT | Mod: 25

## 2017-12-04 PROCEDURE — 36415 COLL VENOUS BLD VENIPUNCTURE: CPT

## 2017-12-04 PROCEDURE — 96374 THER/PROPH/DIAG INJ IV PUSH: CPT

## 2017-12-04 PROCEDURE — 83880 ASSAY OF NATRIURETIC PEPTIDE: CPT

## 2017-12-04 PROCEDURE — 93005 ELECTROCARDIOGRAM TRACING: CPT

## 2017-12-04 PROCEDURE — 85025 COMPLETE CBC W/AUTO DIFF WBC: CPT

## 2017-12-04 PROCEDURE — 81000 URINALYSIS NONAUTO W/SCOPE: CPT

## 2017-12-04 PROCEDURE — 85610 PROTHROMBIN TIME: CPT

## 2017-12-04 PROCEDURE — 82962 GLUCOSE BLOOD TEST: CPT

## 2017-12-04 PROCEDURE — 93010 ELECTROCARDIOGRAM REPORT: CPT | Mod: ,,, | Performed by: INTERNAL MEDICINE

## 2017-12-04 PROCEDURE — 96375 TX/PRO/DX INJ NEW DRUG ADDON: CPT

## 2017-12-04 PROCEDURE — 63600175 PHARM REV CODE 636 W HCPCS: Performed by: EMERGENCY MEDICINE

## 2017-12-04 PROCEDURE — 80053 COMPREHEN METABOLIC PANEL: CPT

## 2017-12-04 PROCEDURE — 87400 INFLUENZA A/B EACH AG IA: CPT

## 2017-12-04 RX ORDER — BENZONATATE 100 MG/1
100 CAPSULE ORAL 3 TIMES DAILY PRN
Qty: 20 CAPSULE | Refills: 0 | Status: SHIPPED | OUTPATIENT
Start: 2017-12-04 | End: 2017-12-14

## 2017-12-04 RX ORDER — AZITHROMYCIN 250 MG/1
250 TABLET, FILM COATED ORAL DAILY
Qty: 6 TABLET | Refills: 0 | Status: SHIPPED | OUTPATIENT
Start: 2017-12-04 | End: 2019-09-17

## 2017-12-04 RX ORDER — CETIRIZINE HYDROCHLORIDE 10 MG/1
10 TABLET ORAL DAILY
Qty: 30 TABLET | Refills: 0 | Status: SHIPPED | OUTPATIENT
Start: 2017-12-04 | End: 2019-12-09

## 2017-12-04 RX ORDER — BENAZEPRIL HYDROCHLORIDE 40 MG/1
40 TABLET ORAL DAILY
Qty: 90 TABLET | Refills: 0 | Status: SHIPPED | OUTPATIENT
Start: 2017-12-04 | End: 2019-09-17 | Stop reason: SDUPTHER

## 2017-12-04 RX ORDER — CLONIDINE HYDROCHLORIDE 0.1 MG/1
0.1 TABLET ORAL
Status: COMPLETED | OUTPATIENT
Start: 2017-12-04 | End: 2017-12-04

## 2017-12-04 RX ORDER — HYDROCHLOROTHIAZIDE 25 MG/1
25 TABLET ORAL DAILY
Qty: 90 TABLET | Refills: 0 | Status: SHIPPED | OUTPATIENT
Start: 2017-12-04 | End: 2019-09-17

## 2017-12-04 RX ORDER — AMLODIPINE BESYLATE 10 MG/1
10 TABLET ORAL DAILY
Qty: 90 TABLET | Refills: 1 | Status: SHIPPED | OUTPATIENT
Start: 2017-12-04 | End: 2019-09-17

## 2017-12-04 RX ORDER — SODIUM CHLORIDE 9 MG/ML
500 INJECTION, SOLUTION INTRAVENOUS
Status: COMPLETED | OUTPATIENT
Start: 2017-12-04 | End: 2017-12-04

## 2017-12-04 RX ORDER — HYDRALAZINE HYDROCHLORIDE 20 MG/ML
10 INJECTION INTRAMUSCULAR; INTRAVENOUS
Status: COMPLETED | OUTPATIENT
Start: 2017-12-04 | End: 2017-12-04

## 2017-12-04 RX ADMIN — HYDRALAZINE HYDROCHLORIDE 10 MG: 20 INJECTION INTRAMUSCULAR; INTRAVENOUS at 05:12

## 2017-12-04 RX ADMIN — CLONIDINE HYDROCHLORIDE 0.1 MG: 0.1 TABLET ORAL at 05:12

## 2017-12-04 RX ADMIN — LORAZEPAM 1 MG: 2 INJECTION INTRAMUSCULAR; INTRAVENOUS at 06:12

## 2017-12-04 RX ADMIN — SODIUM CHLORIDE 500 ML: 0.9 INJECTION, SOLUTION INTRAVENOUS at 05:12

## 2017-12-04 NOTE — ED PROVIDER NOTES
"SCRIBE #1 NOTE: I, Corinne Mack, am scribing for, and in the presence of, Сергей Carrasquillo Jr., MD. I have scribed the entire note.      History      Chief Complaint   Patient presents with    Generalized Body Aches     Pt states, "I have a cough and congestion, chills, sweating, my pressure was up and my sugar was up too."       Review of patient's allergies indicates:  No Known Allergies     HPI   HPI    12/4/2017, 3:26 PM   History obtained from the patient      History of Present Illness: Lydia Danielson is a 62 y.o. female patient who presents to the Emergency Department for generalized myalgias which onset gradually 2 weeks ago. Symptoms are constant and moderate in severity. Pt reports that her "pressure and sugar are up". No mitigating or exacerbating factors reported. Associated sxs include nausea and HA. Patient denies any fever, chills, CP, SOB, V/D, abd pain, back pain, neck pain, dizziness, numbness, and all other sxs at this time. No prior Tx reported. Pt has Hx of CAD, DM, and HTN. No further complaints or concerns at this time.         Arrival mode: Personal vehicle      PCP: Chelsea Bowen MD       Past Medical History:  Past Medical History:   Diagnosis Date    Allergic rhinitis, cause unspecified     CAD (coronary artery disease)     S/P MI in 2002; followed by Dr. Blevins, cardiologist    Diabetes mellitus, type 2     External hemorrhoid     GERD (gastroesophageal reflux disease)     Hyperlipidemia     Hypertension     Keloid     Multinodular goiter     Neck arthritis 07/28/2016    NATASHA (obstructive sleep apnea)     Postmenopausal     No history of abnormal pap smear       Past Surgical History:  Past Surgical History:   Procedure Laterality Date    APPENDECTOMY  2011    CARDIAC CATHETERIZATION      COLONOSCOPY N/A 3/1/2017    Procedure: COLONOSCOPY;  Surgeon: Chase Moffett III, MD;  Location: [facility];  Service: Endoscopy;  Laterality: N/A;    Left breast lumpectomy      NO " cancer    TLH/BSO      Due to noncancerous reasons         Family History:  Family History   Problem Relation Age of Onset    Arthritis Mother     Lupus Sister     Diabetes Brother     Hypertension Brother     Diabetes Paternal Aunt     Cancer Cousin     Cancer Cousin     Heart disease Sister     Diabetes Sister     Hypertension Sister     Diabetes Sister     Hypertension Sister     Diabetes Brother     Hypertension Brother     Cancer Brother      Pancreatic and lung cancer (Smoker)    Hypertension Son     Stroke Neg Hx        Social History:  Social History     Social History Main Topics    Smoking status: Former Smoker     Packs/day: 0.10     Years: 0.20     Types: Cigarettes     Quit date: 7/8/1993    Smokeless tobacco: Never Used    Alcohol use No    Drug use: No    Sexual activity: Not Currently     Partners: Male     Birth control/ protection: Surgical, Post-menopausal       ROS   Review of Systems   Constitutional: Negative for chills and fever.   Respiratory: Negative for cough and shortness of breath.    Cardiovascular: Negative for chest pain and leg swelling.   Gastrointestinal: Positive for nausea. Negative for abdominal pain, diarrhea and vomiting.   Musculoskeletal: Positive for myalgias (generalized). Negative for back pain, neck pain and neck stiffness.   Skin: Negative for rash and wound.   Neurological: Positive for headaches. Negative for dizziness, light-headedness and numbness.   All other systems reviewed and are negative.    Physical Exam      Initial Vitals [12/04/17 1437]   BP Pulse Resp Temp SpO2   (!) 208/88 66 20 97.2 °F (36.2 °C) 96 %      MAP       128          Physical Exam  Nursing Notes and Vital Signs Reviewed.  Constitutional: Patient is in no apparent distress. Well-developed and well-nourished.  Head: Atraumatic. Normocephalic.  Eyes: PERRL. EOM intact. Conjunctivae are not pale. No scleral icterus.   Neck: Supple. Full ROM.   Cardiovascular: Regular rate.  "Regular rhythm. No murmurs, rubs, or gallops. Distal pulses are 2+ and symmetric.  Pulmonary/Chest: No respiratory distress. Clear to auscultation bilaterally. No wheezing or rales.  Abdominal: Soft and non-distended.    Musculoskeletal: Moves all extremities. No obvious deformities.   Skin: Warm and dry.  Neurological:  Alert, awake, and appropriate.  Normal speech.  No acute focal neurological deficits are appreciated.  Psychiatric: Normal affect. Good eye contact. Appropriate in content.    ED Course    Procedures  ED Vital Signs:  Vitals:    12/04/17 1437 12/04/17 1651 12/04/17 1755 12/04/17 1822   BP: (!) 208/88 (!) 191/106 (!) 195/95 (!) 234/99   Pulse: 66 72 60 98   Resp: 20 16 18 (!) 22   Temp: 97.2 °F (36.2 °C)      TempSrc: Oral      SpO2: 96% 100% 100% 100%   Weight: 68.9 kg (152 lb)      Height: 5' 5" (1.651 m)       12/04/17 2001   BP: 137/76   Pulse: 85   Resp:    Temp: 97.9 °F (36.6 °C)   TempSrc: Oral   SpO2: 100%   Weight:    Height:        Abnormal Lab Results:  Labs Reviewed   CBC W/ AUTO DIFFERENTIAL - Abnormal; Notable for the following:        Result Value    Platelets 408 (*)     All other components within normal limits   COMPREHENSIVE METABOLIC PANEL - Abnormal; Notable for the following:     CO2 30 (*)     Glucose 273 (*)     Calcium 11.3 (*)     Total Protein 8.8 (*)     eGFR if  50 (*)     eGFR if non  44 (*)     All other components within normal limits   URINALYSIS - Abnormal; Notable for the following:     Glucose, UA 3+ (*)     All other components within normal limits   POCT GLUCOSE - Abnormal; Notable for the following:     POCT Glucose 325 (*)     All other components within normal limits   PROTIME-INR   INFLUENZA A AND B ANTIGEN   URINALYSIS MICROSCOPIC   B-TYPE NATRIURETIC PEPTIDE   B-TYPE NATRIURETIC PEPTIDE   POCT GLUCOSE MONITORING CONTINUOUS        All Lab Results:  Results for orders placed or performed during the hospital encounter of " 12/04/17   CBC auto differential   Result Value Ref Range    WBC 6.95 3.90 - 12.70 K/uL    RBC 4.81 4.00 - 5.40 M/uL    Hemoglobin 14.7 12.0 - 16.0 g/dL    Hematocrit 41.3 37.0 - 48.5 %    MCV 86 82 - 98 fL    MCH 30.6 27.0 - 31.0 pg    MCHC 35.6 32.0 - 36.0 g/dL    RDW 12.7 11.5 - 14.5 %    Platelets 408 (H) 150 - 350 K/uL    MPV 10.1 9.2 - 12.9 fL    Gran # 3.5 1.8 - 7.7 K/uL    Lymph # 2.9 1.0 - 4.8 K/uL    Mono # 0.4 0.3 - 1.0 K/uL    Eos # 0.2 0.0 - 0.5 K/uL    Baso # 0.01 0.00 - 0.20 K/uL    Gran% 49.7 38.0 - 73.0 %    Lymph% 41.9 18.0 - 48.0 %    Mono% 5.6 4.0 - 15.0 %    Eosinophil% 2.7 0.0 - 8.0 %    Basophil% 0.1 0.0 - 1.9 %    Differential Method Automated    Comprehensive metabolic panel   Result Value Ref Range    Sodium 137 136 - 145 mmol/L    Potassium 4.1 3.5 - 5.1 mmol/L    Chloride 98 95 - 110 mmol/L    CO2 30 (H) 23 - 29 mmol/L    Glucose 273 (H) 70 - 110 mg/dL    BUN, Bld 21 8 - 23 mg/dL    Creatinine 1.3 0.5 - 1.4 mg/dL    Calcium 11.3 (H) 8.7 - 10.5 mg/dL    Total Protein 8.8 (H) 6.0 - 8.4 g/dL    Albumin 4.1 3.5 - 5.2 g/dL    Total Bilirubin 0.5 0.1 - 1.0 mg/dL    Alkaline Phosphatase 87 55 - 135 U/L    AST 20 10 - 40 U/L    ALT 40 10 - 44 U/L    Anion Gap 9 8 - 16 mmol/L    eGFR if African American 50 (A) >60 mL/min/1.73 m^2    eGFR if non African American 44 (A) >60 mL/min/1.73 m^2   Protime-INR   Result Value Ref Range    Prothrombin Time 10.0 9.0 - 12.5 sec    INR 1.0 0.8 - 1.2   Influenza antigen Nasopharyngeal Swab   Result Value Ref Range    Influenza A Ag, EIA Negative Negative    Influenza B Ag, EIA Negative Negative    Flu A & B Source Nasopharyngeal Swab    Urinalysis   Result Value Ref Range    Specimen UA Urine, Clean Catch     Color, UA Yellow Yellow, Straw, Yolanda    Appearance, UA Clear Clear    pH, UA 5.0 5.0 - 8.0    Specific Gravity, UA 1.025 1.005 - 1.030    Protein, UA Negative Negative    Glucose, UA 3+ (A) Negative    Ketones, UA Negative Negative    Bilirubin (UA)  Negative Negative    Occult Blood UA Negative Negative    Nitrite, UA Negative Negative    Urobilinogen, UA Negative <2.0 EU/dL    Leukocytes, UA Negative Negative   Urinalysis Microscopic   Result Value Ref Range    RBC, UA 1 0 - 4 /hpf    WBC, UA 2 0 - 5 /hpf    Bacteria, UA Rare None-Occ /hpf    Yeast, UA None None    Microscopic Comment SEE COMMENT    Brain natriuretic peptide   Result Value Ref Range    BNP <10 0 - 99 pg/mL   POCT glucose   Result Value Ref Range    POCT Glucose 325 (H) 70 - 110 mg/dL       Imaging Results:  Imaging Results          CT Head Without Contrast (Final result)  Result time 12/04/17 19:27:20    Final result by JONATHAN Allen Sr., MD (12/04/17 19:27:20)                 Impression:      1. There are subacute to chronic appearing ischemic changes in the deep white matter of both cerebral hemispheres.   2. There is no intracranial hemorrhage.    All CT scans at this facility use dose modulation, iterative reconstruction, and/or weight base dosing when appropriate to reduce radiation dose when appropriate to reduce radiation dose to as low as reasonably achievable.      Electronically signed by: JONATHAN ALLEN MD  Date:     12/04/17  Time:    19:27              Narrative:    CT of Head without IV contrast    History:     Headache    Technique: Standard brain CT protocol without IV contrast was performed.     Finding: There are subacute to chronic appearing ischemic changes in the deep white matter of both cerebral hemispheres. There is no intracranial hemorrhage. There is no skull fracture. The paranasal sinuses are normal in appearance.                             X-Ray Chest AP Portable (Final result)  Result time 12/04/17 15:56:51    Final result by Humphrey Hart III, MD (12/04/17 15:56:51)                 Impression:     No acute disease identified in the chest.        Electronically signed by: HUMPHREY HART MD  Date:     12/04/17  Time:    15:56              Narrative:    XR CHEST AP  PORTABLE    Clinical history: weakness.      Findings: Cardiomediastinal silhouette is within normal limits for AP technique. The lungs appear clear of active disease. No acute appearing infiltrate, pleural effusion, pneumothorax or other acute disease identified.                             The EKG was ordered, reviewed, and independently interpreted by the ED provider.  Interpretation time: 1541  Rate: 65 BPM  Rhythm: normal sinus rhythm and sinus arrhythmia  Interpretation: L bundle branch block. No STEMI.           The Emergency Provider reviewed the vital signs and test results, which are outlined above.    ED Discussion     7:56 PM: Reassessed pt at this time. Pt is awake, alert, and in no distress. Discussed with pt all pertinent ED information and results. Discussed pt dx and plan of tx. Gave pt all f/u and return to the ED instructions. All questions and concerns were addressed at this time. Pt expresses understanding of information and instructions, and is comfortable with plan to discharge. Pt is stable for discharge.    I discussed with patient and/or family/caretaker that evaluation in the ED does not suggest any emergent or life threatening medical conditions requiring immediate intervention beyond what was provided in the ED, and I believe patient is safe for discharge.  Regardless, an unremarkable evaluation in the ED does not preclude the development or presence of a serious of life threatening condition. As such, patient was instructed to return immediately for any worsening or change in current symptoms.      ED Medication(s):  Medications   cloNIDine tablet 0.1 mg (0.1 mg Oral Given 12/4/17 1755)   hydrALAZINE injection 10 mg (10 mg Intravenous Given 12/4/17 1755)   0.9%  NaCl infusion (0 mLs Intravenous Stopped 12/4/17 1822)   lorazepam (ATIVAN) injection 1 mg (1 mg Intravenous Given 12/4/17 1822)       New Prescriptions    AMLODIPINE (NORVASC) 10 MG TABLET    Take 1 tablet (10 mg total) by  mouth once daily.    AZITHROMYCIN (Z-DIANE) 250 MG TABLET    Take 1 tablet (250 mg total) by mouth once daily. Take first 2 tablets together, then 1 every day until finished.    BENAZEPRIL (LOTENSIN) 40 MG TABLET    Take 1 tablet (40 mg total) by mouth once daily.    BENZONATATE (TESSALON) 100 MG CAPSULE    Take 1 capsule (100 mg total) by mouth 3 (three) times daily as needed for Cough.    CETIRIZINE (ZYRTEC) 10 MG TABLET    Take 1 tablet (10 mg total) by mouth once daily. Prn sinus congestion    HYDROCHLOROTHIAZIDE (HYDRODIURIL) 25 MG TABLET    Take 1 tablet (25 mg total) by mouth once daily.       Follow-up Information     Chelsea Bowen MD. Call in 1 day.    Specialty:  Family Medicine  Why:  to schedule appt for recheck  Contact information:  [address] [place] [address]  [phone]                     Medical Decision Making    Medical Decision Making:   Clinical Tests:   Lab Tests: Ordered and Reviewed  Radiological Study: Ordered and Reviewed  Medical Tests: Ordered and Reviewed           Scribe Attestation:   Scribe #1: I performed the above scribed service and the documentation accurately describes the services I performed. I attest to the accuracy of the note.    Attending:   Physician Attestation Statement for Scribe #1: I, Сергей Carrasquillo Jr., MD, personally performed the services described in this documentation, as scribed by Corinne Mack, in my presence, and it is both accurate and complete.          Clinical Impression       ICD-10-CM ICD-9-CM   1. Sinus headache R51 784.0   2. HTN (hypertension) I10 401.9   3. Cough R05 786.2       Disposition:   Disposition: Discharged  Condition: Stable         Сергей Carrasquillo Jr., MD  12/04/17 2016

## 2019-02-14 ENCOUNTER — PATIENT OUTREACH (OUTPATIENT)
Dept: ADMINISTRATIVE | Facility: HOSPITAL | Age: 64
End: 2019-02-14

## 2019-04-18 ENCOUNTER — PATIENT OUTREACH (OUTPATIENT)
Dept: ADMINISTRATIVE | Facility: HOSPITAL | Age: 64
End: 2019-04-18

## 2019-09-16 NOTE — PROGRESS NOTES
"Subjective:       Patient ID: Lydia Danielson is a 64 y.o. female.    Chief Complaint   Patient presents with    Annual Exam         HPI    Lydia Danielson is here today for an annual wellness exam.  I have reviewed the patient's medical history in detail and updated the computerized patient record.      DIABETES PROTOCOL:  · A1C:  7/26/2017 --- 9.5 %  · FOOT:  Due now.  · EYE:  Due now.  · ACE/ARB:  benazepril  · STATIN:  Lipitor  · URINE MICROALB:  1/25/2017      Lab Results   Component Value Date    HGBA1C 9.5 (H) 07/26/2017       Review of Systems   Constitutional: Negative for activity change, appetite change, chills, diaphoresis, fatigue, fever and unexpected weight change.   HENT: Negative for nasal congestion, mouth sores and tinnitus.    Eyes: Negative for discharge and visual disturbance.   Respiratory: Negative for cough, shortness of breath and wheezing.    Cardiovascular: Negative for chest pain, palpitations and leg swelling.   Gastrointestinal: Negative for abdominal pain, bloating, blood in stool, constipation, diarrhea, nausea, vomiting and reflux.   Endocrine: Positive for diabetes. Negative for hot flashes.        Out of her DM medication in at least the past year, does not check FBS.  Reports polyuria, nocturia and polydipsia.  Normal appetite.   Genitourinary: Negative.    Musculoskeletal: Negative.    Integumentary:  Negative for rash. Negative.   Neurological: Negative for vertigo, seizures, syncope, numbness and headaches.   Hematological: Negative.    Psychiatric/Behavioral: Positive for depression and sleep disturbance (wax/wane). The patient is nervous/anxious.         Reports "I have a problem w/ my nerves".  Has not been taking her Zoloft and wishes to restart.  Sleep patterns fluctuate.   Breast: negative.           Review of patient's allergies indicates:  No Known Allergies      Medication List with Changes/Refills   Current Medications    BLOOD SUGAR DIAGNOSTIC (ONETOUCH ULTRA " "TEST) STRP    1 strip by Misc.(Non-Drug; Combo Route) route 2 (two) times daily.    CETIRIZINE (ZYRTEC) 10 MG TABLET    Take 1 tablet (10 mg total) by mouth once daily. Prn sinus congestion    CYCLOBENZAPRINE (FLEXERIL) 10 MG TABLET    Take 1 tablet (10 mg total) by mouth nightly.    DIAZEPAM (VALIUM) 2 MG TABLET    Take 1 tablet (2 mg total) by mouth once as needed for Anxiety (30 minutes prior to procedure).    FLUTICASONE (FLONASE) 50 MCG/ACTUATION NASAL SPRAY    2 Spray, Suspension Nasal Every day.  each nostril    HYDROCORTISONE (WESTCORT) 0.2 % CREAM    Apply topically 2 (two) times daily as needed.    IBUPROFEN (ADVIL,MOTRIN) 800 MG TABLET    Take by mouth. 1 Tablet Oral Twice a day .  with food    INSULIN GLARGINE, TOUJEO, (TOUJEO SOLOSTAR) 300 UNIT/ML (1.5 ML) INPN PEN    Inject 26 Units into the skin every evening.    INSULIN NEEDLES, DISPOSABLE, 32 X 5/32 " NDLE    Use daily with Lantus    MELOXICAM (MOBIC) 15 MG TABLET    Take 1 tablet (15 mg total) by mouth once daily.    TRIAMCINOLONE ACETONIDE 0.1% (KENALOG) 0.1 % CREAM    Apply topically 2 (two) times daily as needed.   Changed and/or Refilled Medications    Modified Medication Previous Medication    ATORVASTATIN (LIPITOR) 40 MG TABLET atorvastatin (LIPITOR) 40 MG tablet       Take 1 tablet (40 mg total) by mouth nightly.    TAKE 1 TABLET BY MOUTH EVERY NIGHT AT BEDTIME    BENAZEPRIL (LOTENSIN) 40 MG TABLET benazepril (LOTENSIN) 40 MG tablet       Take 1 tablet (40 mg total) by mouth once daily.    Take 1 tablet (40 mg total) by mouth once daily.    GLIMEPIRIDE (AMARYL) 4 MG TABLET glimepiride (AMARYL) 4 MG tablet       TAKE 1 TABLET BY MOUTH TWICE DAILY    TAKE 1 TABLET BY MOUTH TWICE DAILY    ISOSORBIDE-HYDRALAZINE 20-37.5 MG (BIDIL) 20-37.5 MG TAB isosorbide-hydrALAZINE 20-37.5 mg (BIDIL) 20-37.5 mg Tab       Take 1 tablet by mouth 2 (two) times daily.    Take 1 tablet by mouth 2 (two) times daily.    SERTRALINE (ZOLOFT) 50 MG TABLET sertraline " (ZOLOFT) 50 MG tablet       Take 1 tablet (50 mg total) by mouth once daily. For anxiety    Take 1 tablet (50 mg total) by mouth once daily. For anxiety   Discontinued Medications    AMLODIPINE (NORVASC) 10 MG TABLET    Take 1 tablet (10 mg total) by mouth once daily.    AZITHROMYCIN (Z-DIANE) 250 MG TABLET    Take 1 tablet (250 mg total) by mouth once daily. Take first 2 tablets together, then 1 every day until finished.    FUROSEMIDE (LASIX) 40 MG TABLET    TAKE 1 TABLET BY MOUTH EVERY DAY    HYDROCHLOROTHIAZIDE (HYDRODIURIL) 25 MG TABLET    Take 1 tablet (25 mg total) by mouth once daily.    METOPROLOL SUCCINATE (TOPROL-XL) 25 MG 24 HR TABLET    Take 25 mg by mouth once daily.    PROMETHAZINE-DEXTROMETHORPHAN (PROMETHAZINE-DM) 6.25-15 MG/5 ML SYRP    Take 1 teaspoon every 4 - 6 hours prn cough    SAXAGLIPTIN (ONGLYZA) 5 MG TAB TABLET    Take by mouth. 1 Tablet Oral Every day    WELCHOL 3.75 GRAM PWPK    TAKE 1 PACKET BY MOUTH EVERY DAY AS DIRECTED       Patient Active Problem List   Diagnosis    HTN (hypertension)    Coronary artery disease    Multinodular goiter    Hyperlipidemia    Asymptomatic postmenopausal status (age-related) (natural)    NATASHA (obstructive sleep apnea)    Cervical arthritis    Cervical radiculopathy    Uncontrolled type 2 diabetes mellitus with hyperglycemia, with long-term current use of insulin       Past Medical History:   Diagnosis Date    Allergic rhinitis, cause unspecified     CAD (coronary artery disease)     S/P MI in 2002; followed by Dr. Blevins, cardiologist    Diabetes mellitus, type 2     External hemorrhoid     GERD (gastroesophageal reflux disease)     Hyperlipidemia     Hypertension     Keloid     Multinodular goiter     Neck arthritis 07/28/2016    NATASHA (obstructive sleep apnea)     Postmenopausal     No history of abnormal pap smear       Past Surgical History:   Procedure Laterality Date    APPENDECTOMY  2011    CARDIAC CATHETERIZATION      COLONOSCOPY  "N/A 3/1/2017    [facility]    COLONOSCOPY N/A 2015    [facility] at [facility]    Left breast lumpectomy      NO cancer    TLH/BSO      Due to noncancerous reasons       Family History   Problem Relation Age of Onset    Arthritis Mother     Lupus Sister     Diabetes Brother     Hypertension Brother     Diabetes Paternal Aunt     Cancer Cousin     Cancer Cousin     Heart disease Sister     Diabetes Sister     Hypertension Sister     Diabetes Sister     Hypertension Sister     Diabetes Brother     Hypertension Brother     Cancer Brother         Pancreatic and lung cancer (Smoker)    Hypertension Son     Stroke Neg Hx        Social History     Socioeconomic History    Marital status:     Number of children: 1   Occupational History    Occupation: Housekeeping     Comment: [employer]   Tobacco Use    Smoking status: Former Smoker     Packs/day: 0.10     Years: 0.20     Pack years: 0.02     Types: Cigarettes     Last attempt to quit: 1993     Years since quittin.2    Smokeless tobacco: Never Used   Substance and Sexual Activity    Alcohol use: No     Alcohol/week: 0.0 oz    Drug use: No    Sexual activity: Not Currently     Partners: Male     Birth control/protection: Surgical, Post-menopausal       Objective:     Vitals:    19 0952   BP: 126/78   Pulse: 67   Temp: 96.2 °F (35.7 °C)   TempSrc: Tympanic   SpO2: 98%   Weight: 67.9 kg (149 lb 11.1 oz)   Height: 5' 5" (1.651 m)   PainSc: 0-No pain         Physical Exam   Constitutional: She is oriented to person, place, and time. She appears well-developed and well-nourished. No distress.   HENT:   Head: Normocephalic and atraumatic.   Right Ear: External ear normal.   Left Ear: External ear normal.   Nose: Nose normal. No mucosal edema, rhinorrhea, sinus tenderness or nasal deformity. No epistaxis.   Mouth/Throat: Uvula is midline, oropharynx " is clear and moist and mucous membranes are normal. Mucous membranes are not dry and not cyanotic. No oral lesions. Normal dentition. No uvula swelling. No oropharyngeal exudate or tonsillar abscesses.   Eyes: Pupils are equal, round, and reactive to light. Conjunctivae, EOM and lids are normal. Lids are everted and swept, no foreign bodies found. Right eye exhibits no discharge and no exudate. Left eye exhibits no discharge and no exudate. Right conjunctiva is not injected. Left conjunctiva is not injected. No scleral icterus.   Neck: Normal range of motion. Neck supple. No JVD present. Carotid bruit is not present. No tracheal deviation, no edema, no erythema and normal range of motion present. No thyroid mass and no thyromegaly present.   Cardiovascular: Normal rate, regular rhythm and intact distal pulses. Exam reveals no gallop and no friction rub.   No murmur heard.  Pulmonary/Chest: Effort normal and breath sounds normal. No stridor. No respiratory distress. She has no wheezes. She exhibits no tenderness.   Abdominal: Soft. Bowel sounds are normal. She exhibits no distension and no mass. There is no hepatosplenomegaly. There is no tenderness. There is no rebound, no guarding and no CVA tenderness.   Genitourinary:   Genitourinary Comments: History of TLH/BSO   Musculoskeletal: Normal range of motion. She exhibits no edema, tenderness or deformity.   Lymphadenopathy:     She has no cervical adenopathy.   Neurological: She is alert and oriented to person, place, and time. She has normal strength and normal reflexes. She displays no atrophy and no tremor. No cranial nerve deficit or sensory deficit. She exhibits normal muscle tone. Coordination and gait normal.   Skin: Skin is warm, dry and intact. Capillary refill takes less than 2 seconds. No bruising and no rash noted. She is not diaphoretic. No erythema. No pallor.   Psychiatric: She has a normal mood and affect. Her speech is normal and behavior is normal.  Judgment and thought content normal. Cognition and memory are normal. Cognition and memory are not impaired.       Protective Sensation (w/ 10 gram monofilament):  Right: Intact  Left: Intact    Visual Inspection:  Normal -  Bilateral    Pedal Pulses:   Right: Present  Left: Present    Posterior tibialis:   Right:Present  Left: Present        Diagnosis       1. Annual physical exam    2. Essential hypertension    3. Hyperlipidemia, unspecified hyperlipidemia type    4. Uncontrolled type 2 diabetes mellitus with hyperglycemia, with long-term current use of insulin    5. Coronary artery disease involving native coronary artery of native heart without angina pectoris    6. Multinodular goiter    7. Mixed anxiety and depressive disorder    8. Asymptomatic postmenopausal status    9. Breast cancer screening          Assessment/ Plan     Annual physical exam  -     CBC auto differential; Future; Expected date: 09/17/2019  -     Comprehensive metabolic panel; Future; Expected date: 09/17/2019  -     TSH; Future; Expected date: 09/17/2019  -     Lipid panel; Future; Expected date: 09/17/2019  -     Hemoglobin A1c; Future; Expected date: 09/17/2019  -     Microalbumin/creatinine urine ratio    Essential hypertension  -     benazepril (LOTENSIN) 40 MG tablet; Take 1 tablet (40 mg total) by mouth once daily.  Dispense: 90 tablet; Refill: 1  -     isosorbide-hydrALAZINE 20-37.5 mg (BIDIL) 20-37.5 mg Tab; Take 1 tablet by mouth 2 (two) times daily.  Dispense: 180 tablet; Refill: 1  -     Ambulatory referral to Ophthalmology  -     CBC auto differential; Future; Expected date: 09/17/2019  -     Comprehensive metabolic panel; Future; Expected date: 09/17/2019  -     TSH; Future; Expected date: 09/17/2019  -     Lipid panel; Future; Expected date: 09/17/2019  -     Hemoglobin A1c; Future; Expected date: 09/17/2019  -     Microalbumin/creatinine urine ratio    Hyperlipidemia, unspecified hyperlipidemia type  -     atorvastatin  (LIPITOR) 40 MG tablet; Take 1 tablet (40 mg total) by mouth nightly.  Dispense: 90 tablet; Refill: 1  -     CBC auto differential; Future; Expected date: 09/17/2019  -     Comprehensive metabolic panel; Future; Expected date: 09/17/2019  -     TSH; Future; Expected date: 09/17/2019  -     Lipid panel; Future; Expected date: 09/17/2019    Uncontrolled type 2 diabetes mellitus with hyperglycemia, with long-term current use of insulin  -     glimepiride (AMARYL) 4 MG tablet; TAKE 1 TABLET BY MOUTH TWICE DAILY  Dispense: 180 tablet; Refill: 1  -     Ambulatory referral to Ophthalmology  -     CBC auto differential; Future; Expected date: 09/17/2019  -     Comprehensive metabolic panel; Future; Expected date: 09/17/2019  -     TSH; Future; Expected date: 09/17/2019  -     Lipid panel; Future; Expected date: 09/17/2019  -     Hemoglobin A1c; Future; Expected date: 09/17/2019  -     Microalbumin/creatinine urine ratio    Coronary artery disease involving native coronary artery of native heart without angina pectoris  · Instructed to start ASA 81 mg daily.  -     Ambulatory referral to Ophthalmology  -     CBC auto differential; Future; Expected date: 09/17/2019  -     Comprehensive metabolic panel; Future; Expected date: 09/17/2019    Multinodular goiter  · Stable, followed by ENT.  -     CBC auto differential; Future; Expected date: 09/17/2019  -     Comprehensive metabolic panel; Future; Expected date: 09/17/2019  -     TSH; Future; Expected date: 09/17/2019    Mixed anxiety and depressive disorder  · Not controlled, restarted Zoloft.  -     sertraline (ZOLOFT) 50 MG tablet; Take 1 tablet (50 mg total) by mouth once daily. For anxiety  Dispense: 90 tablet; Refill: 1    Asymptomatic postmenopausal status  -     DXA Bone Density Spine And Hip; Future; Expected date: 09/17/2019    Breast cancer screening  -     Mammo Digital Screening Bilat; Future; Expected date: 09/17/2019        DEXA.  Mammogram.  Flu vaccine --- out of  high-dose  Lab pending.  Follow-up in clinic as needed.        Future Appointments   Date Time Provider Department Center   9/24/2019 10:20 AM Kelly Reveles OD [facility] OPHTHAL [facility]   9/24/2019 11:40 AM [facility] MAMMO1-SCR [facility] MAMMO [facility]   9/24/2019 12:00 PM HG BMD1 HG DEXABMD [facility]         Patient Care Team:  Primary Doctor No as PCP - General  Jessica Tiwari LPN as Care Coordinator (Internal Medicine)      MARIETTA Quick  [facility]

## 2019-09-17 ENCOUNTER — LAB VISIT (OUTPATIENT)
Dept: LAB | Facility: HOSPITAL | Age: 64
End: 2019-09-17
Payer: MEDICARE

## 2019-09-17 ENCOUNTER — OFFICE VISIT (OUTPATIENT)
Dept: FAMILY MEDICINE | Facility: CLINIC | Age: 64
End: 2019-09-17
Payer: MEDICARE

## 2019-09-17 VITALS
SYSTOLIC BLOOD PRESSURE: 126 MMHG | DIASTOLIC BLOOD PRESSURE: 78 MMHG | HEIGHT: 65 IN | OXYGEN SATURATION: 98 % | WEIGHT: 149.69 LBS | BODY MASS INDEX: 24.94 KG/M2 | HEART RATE: 67 BPM | TEMPERATURE: 96 F

## 2019-09-17 DIAGNOSIS — E78.5 HYPERLIPIDEMIA, UNSPECIFIED HYPERLIPIDEMIA TYPE: Chronic | ICD-10-CM

## 2019-09-17 DIAGNOSIS — Z00.00 ANNUAL PHYSICAL EXAM: ICD-10-CM

## 2019-09-17 DIAGNOSIS — Z79.4 UNCONTROLLED TYPE 2 DIABETES MELLITUS WITH HYPERGLYCEMIA, WITH LONG-TERM CURRENT USE OF INSULIN: ICD-10-CM

## 2019-09-17 DIAGNOSIS — Z78.0 ASYMPTOMATIC POSTMENOPAUSAL STATUS: Chronic | ICD-10-CM

## 2019-09-17 DIAGNOSIS — I25.10 CORONARY ARTERY DISEASE INVOLVING NATIVE CORONARY ARTERY OF NATIVE HEART WITHOUT ANGINA PECTORIS: Chronic | ICD-10-CM

## 2019-09-17 DIAGNOSIS — Z12.39 BREAST CANCER SCREENING: ICD-10-CM

## 2019-09-17 DIAGNOSIS — I10 ESSENTIAL HYPERTENSION: Chronic | ICD-10-CM

## 2019-09-17 DIAGNOSIS — Z00.00 ANNUAL PHYSICAL EXAM: Primary | ICD-10-CM

## 2019-09-17 DIAGNOSIS — E11.65 UNCONTROLLED TYPE 2 DIABETES MELLITUS WITH HYPERGLYCEMIA, WITH LONG-TERM CURRENT USE OF INSULIN: ICD-10-CM

## 2019-09-17 DIAGNOSIS — E04.2 MULTINODULAR GOITER: Chronic | ICD-10-CM

## 2019-09-17 DIAGNOSIS — F41.8 MIXED ANXIETY AND DEPRESSIVE DISORDER: ICD-10-CM

## 2019-09-17 PROBLEM — M54.2 NECK PAIN: Status: RESOLVED | Noted: 2017-01-25 | Resolved: 2019-09-17

## 2019-09-17 PROBLEM — R21 RASH: Status: RESOLVED | Noted: 2017-07-26 | Resolved: 2019-09-17

## 2019-09-17 PROBLEM — Z12.11 COLON CANCER SCREENING: Status: RESOLVED | Noted: 2017-03-01 | Resolved: 2019-09-17

## 2019-09-17 PROCEDURE — 3074F PR MOST RECENT SYSTOLIC BLOOD PRESSURE < 130 MM HG: ICD-10-PCS | Mod: HCNC,CPTII,S$GLB, | Performed by: REGISTERED NURSE

## 2019-09-17 PROCEDURE — 84443 ASSAY THYROID STIM HORMONE: CPT | Mod: HCNC

## 2019-09-17 PROCEDURE — 3078F DIAST BP <80 MM HG: CPT | Mod: HCNC,CPTII,S$GLB, | Performed by: REGISTERED NURSE

## 2019-09-17 PROCEDURE — 80061 LIPID PANEL: CPT | Mod: HCNC

## 2019-09-17 PROCEDURE — 99397 PR PREVENTIVE VISIT,EST,65 & OVER: ICD-10-PCS | Mod: HCNC,S$GLB,, | Performed by: REGISTERED NURSE

## 2019-09-17 PROCEDURE — 85025 COMPLETE CBC W/AUTO DIFF WBC: CPT | Mod: HCNC

## 2019-09-17 PROCEDURE — 36415 COLL VENOUS BLD VENIPUNCTURE: CPT | Mod: HCNC,PO

## 2019-09-17 PROCEDURE — 99499 UNLISTED E&M SERVICE: CPT | Mod: HCNC,S$GLB,, | Performed by: REGISTERED NURSE

## 2019-09-17 PROCEDURE — 80053 COMPREHEN METABOLIC PANEL: CPT | Mod: HCNC

## 2019-09-17 PROCEDURE — 99999 PR PBB SHADOW E&M-EST. PATIENT-LVL V: ICD-10-PCS | Mod: PBBFAC,HCNC,, | Performed by: REGISTERED NURSE

## 2019-09-17 PROCEDURE — 82043 UR ALBUMIN QUANTITATIVE: CPT | Mod: HCNC

## 2019-09-17 PROCEDURE — 99397 PER PM REEVAL EST PAT 65+ YR: CPT | Mod: HCNC,S$GLB,, | Performed by: REGISTERED NURSE

## 2019-09-17 PROCEDURE — 99499 RISK ADDL DX/OHS AUDIT: ICD-10-PCS | Mod: HCNC,S$GLB,, | Performed by: REGISTERED NURSE

## 2019-09-17 PROCEDURE — 3078F PR MOST RECENT DIASTOLIC BLOOD PRESSURE < 80 MM HG: ICD-10-PCS | Mod: HCNC,CPTII,S$GLB, | Performed by: REGISTERED NURSE

## 2019-09-17 PROCEDURE — 83036 HEMOGLOBIN GLYCOSYLATED A1C: CPT | Mod: HCNC

## 2019-09-17 PROCEDURE — 84439 ASSAY OF FREE THYROXINE: CPT | Mod: HCNC

## 2019-09-17 PROCEDURE — 3074F SYST BP LT 130 MM HG: CPT | Mod: HCNC,CPTII,S$GLB, | Performed by: REGISTERED NURSE

## 2019-09-17 PROCEDURE — 99999 PR PBB SHADOW E&M-EST. PATIENT-LVL V: CPT | Mod: PBBFAC,HCNC,, | Performed by: REGISTERED NURSE

## 2019-09-17 RX ORDER — ISOSORBIDE DINITRATE AND HYDRALAZINE HYDROCHLORIDE 37.5; 2 MG/1; MG/1
1 TABLET ORAL 2 TIMES DAILY
Qty: 180 TABLET | Refills: 1 | Status: SHIPPED | OUTPATIENT
Start: 2019-09-17 | End: 2020-09-28

## 2019-09-17 RX ORDER — ATORVASTATIN CALCIUM 40 MG/1
40 TABLET, FILM COATED ORAL NIGHTLY
Qty: 90 TABLET | Refills: 1 | Status: SHIPPED | OUTPATIENT
Start: 2019-09-17

## 2019-09-17 RX ORDER — BENAZEPRIL HYDROCHLORIDE 40 MG/1
40 TABLET ORAL DAILY
Qty: 90 TABLET | Refills: 1 | Status: SHIPPED | OUTPATIENT
Start: 2019-09-17 | End: 2020-02-24 | Stop reason: SDUPTHER

## 2019-09-17 RX ORDER — SERTRALINE HYDROCHLORIDE 50 MG/1
50 TABLET, FILM COATED ORAL DAILY
Qty: 90 TABLET | Refills: 1 | Status: SHIPPED | OUTPATIENT
Start: 2019-09-17 | End: 2021-04-28

## 2019-09-17 RX ORDER — ASPIRIN 81 MG/1
81 TABLET ORAL DAILY
COMMUNITY

## 2019-09-17 RX ORDER — GLIMEPIRIDE 4 MG/1
TABLET ORAL
Qty: 180 TABLET | Refills: 1 | Status: SHIPPED | OUTPATIENT
Start: 2019-09-17 | End: 2020-02-20

## 2019-09-18 LAB
ALBUMIN SERPL BCP-MCNC: 4.1 G/DL (ref 3.5–5.2)
ALP SERPL-CCNC: 82 U/L (ref 55–135)
ALT SERPL W/O P-5'-P-CCNC: 19 U/L (ref 10–44)
ANION GAP SERPL CALC-SCNC: 9 MMOL/L (ref 8–16)
AST SERPL-CCNC: 19 U/L (ref 10–40)
BASOPHILS # BLD AUTO: 0.02 K/UL (ref 0–0.2)
BASOPHILS NFR BLD: 0.3 % (ref 0–1.9)
BILIRUB SERPL-MCNC: 0.7 MG/DL (ref 0.1–1)
BUN SERPL-MCNC: 13 MG/DL (ref 8–23)
CALCIUM SERPL-MCNC: 10.2 MG/DL (ref 8.7–10.5)
CHLORIDE SERPL-SCNC: 102 MMOL/L (ref 95–110)
CHOLEST SERPL-MCNC: 184 MG/DL (ref 120–199)
CHOLEST/HDLC SERPL: 2.9 {RATIO} (ref 2–5)
CO2 SERPL-SCNC: 28 MMOL/L (ref 23–29)
CREAT SERPL-MCNC: 1.1 MG/DL (ref 0.5–1.4)
DIFFERENTIAL METHOD: ABNORMAL
EOSINOPHIL # BLD AUTO: 0.1 K/UL (ref 0–0.5)
EOSINOPHIL NFR BLD: 2.2 % (ref 0–8)
ERYTHROCYTE [DISTWIDTH] IN BLOOD BY AUTOMATED COUNT: 11.9 % (ref 11.5–14.5)
EST. GFR  (AFRICAN AMERICAN): >60 ML/MIN/1.73 M^2
EST. GFR  (NON AFRICAN AMERICAN): 52.8 ML/MIN/1.73 M^2
ESTIMATED AVG GLUCOSE: 341 MG/DL (ref 68–131)
GLUCOSE SERPL-MCNC: 234 MG/DL (ref 70–110)
HBA1C MFR BLD HPLC: 13.5 % (ref 4–5.6)
HCT VFR BLD AUTO: 43.1 % (ref 37–48.5)
HDLC SERPL-MCNC: 64 MG/DL (ref 40–75)
HDLC SERPL: 34.8 % (ref 20–50)
HGB BLD-MCNC: 14 G/DL (ref 12–16)
IMM GRANULOCYTES # BLD AUTO: 0.02 K/UL (ref 0–0.04)
IMM GRANULOCYTES NFR BLD AUTO: 0.3 % (ref 0–0.5)
LDLC SERPL CALC-MCNC: 103.8 MG/DL (ref 63–159)
LYMPHOCYTES # BLD AUTO: 2.7 K/UL (ref 1–4.8)
LYMPHOCYTES NFR BLD: 45.3 % (ref 18–48)
MCH RBC QN AUTO: 30 PG (ref 27–31)
MCHC RBC AUTO-ENTMCNC: 32.5 G/DL (ref 32–36)
MCV RBC AUTO: 92 FL (ref 82–98)
MONOCYTES # BLD AUTO: 0.4 K/UL (ref 0.3–1)
MONOCYTES NFR BLD: 6.2 % (ref 4–15)
NEUTROPHILS # BLD AUTO: 2.7 K/UL (ref 1.8–7.7)
NEUTROPHILS NFR BLD: 45.7 % (ref 38–73)
NONHDLC SERPL-MCNC: 120 MG/DL
NRBC BLD-RTO: 0 /100 WBC
PLATELET # BLD AUTO: 366 K/UL (ref 150–350)
PMV BLD AUTO: 11.5 FL (ref 9.2–12.9)
POTASSIUM SERPL-SCNC: 4.7 MMOL/L (ref 3.5–5.1)
PROT SERPL-MCNC: 7.8 G/DL (ref 6–8.4)
RBC # BLD AUTO: 4.67 M/UL (ref 4–5.4)
SODIUM SERPL-SCNC: 139 MMOL/L (ref 136–145)
T4 FREE SERPL-MCNC: 1.08 NG/DL (ref 0.71–1.51)
TRIGL SERPL-MCNC: 81 MG/DL (ref 30–150)
TSH SERPL DL<=0.005 MIU/L-ACNC: 0.17 UIU/ML (ref 0.4–4)
WBC # BLD AUTO: 5.94 K/UL (ref 3.9–12.7)

## 2019-09-19 LAB
ALBUMIN/CREAT UR: 20.1 UG/MG (ref 0–30)
CREAT UR-MCNC: 169 MG/DL (ref 15–325)
MICROALBUMIN UR DL<=1MG/L-MCNC: 34 UG/ML

## 2019-09-23 ENCOUNTER — TELEPHONE (OUTPATIENT)
Dept: FAMILY MEDICINE | Facility: CLINIC | Age: 64
End: 2019-09-23

## 2019-09-23 NOTE — TELEPHONE ENCOUNTER
----- Message from Sun Banks sent at 9/23/2019 12:21 PM CDT -----  Contact: self/[phone]  Type:  Test Results    Who Called: Lydia Danielson    Name of Test (Lab/Mammo/Etc): test  Date of Test: 09-17-19  Ordering Provider: Vic Joe  Where the test was performed: [facility]  Would the patient rather a call back or a response via [facility]? Call back   Best Call Back Number: [phone]  Additional Information:

## 2019-09-24 ENCOUNTER — OFFICE VISIT (OUTPATIENT)
Dept: OPHTHALMOLOGY | Facility: CLINIC | Age: 64
End: 2019-09-24
Payer: MEDICARE

## 2019-09-24 ENCOUNTER — HOSPITAL ENCOUNTER (OUTPATIENT)
Dept: RADIOLOGY | Facility: HOSPITAL | Age: 64
Discharge: HOME | End: 2019-09-24
Attending: REGISTERED NURSE
Payer: MEDICARE

## 2019-09-24 DIAGNOSIS — H52.03 HYPEROPIA WITH PRESBYOPIA OF BOTH EYES: ICD-10-CM

## 2019-09-24 DIAGNOSIS — E11.9 TYPE 2 DIABETES MELLITUS WITHOUT RETINOPATHY: Primary | ICD-10-CM

## 2019-09-24 DIAGNOSIS — H52.4 HYPEROPIA WITH PRESBYOPIA OF BOTH EYES: ICD-10-CM

## 2019-09-24 DIAGNOSIS — Z13.5 GLAUCOMA SCREENING: ICD-10-CM

## 2019-09-24 DIAGNOSIS — Z12.39 BREAST CANCER SCREENING: ICD-10-CM

## 2019-09-24 DIAGNOSIS — I10 ESSENTIAL HYPERTENSION: Chronic | ICD-10-CM

## 2019-09-24 PROCEDURE — 92015 PR REFRACTION: ICD-10-PCS | Mod: HCNC,S$GLB,, | Performed by: OPTOMETRIST

## 2019-09-24 PROCEDURE — 99499 RISK ADDL DX/OHS AUDIT: ICD-10-PCS | Mod: HCNC,S$GLB,, | Performed by: OPTOMETRIST

## 2019-09-24 PROCEDURE — 77063 BREAST TOMOSYNTHESIS BI: CPT | Mod: 26,HCNC,, | Performed by: RADIOLOGY

## 2019-09-24 PROCEDURE — 77063 MAMMO DIGITAL SCREENING BILAT WITH TOMOSYNTHESIS_CAD: ICD-10-PCS | Mod: 26,HCNC,, | Performed by: RADIOLOGY

## 2019-09-24 PROCEDURE — 77067 SCR MAMMO BI INCL CAD: CPT | Mod: 26,HCNC,, | Performed by: RADIOLOGY

## 2019-09-24 PROCEDURE — 99499 UNLISTED E&M SERVICE: CPT | Mod: HCNC,S$GLB,, | Performed by: OPTOMETRIST

## 2019-09-24 PROCEDURE — 92004 PR EYE EXAM, NEW PATIENT,COMPREHESV: ICD-10-PCS | Mod: HCNC,S$GLB,, | Performed by: OPTOMETRIST

## 2019-09-24 PROCEDURE — 77067 MAMMO DIGITAL SCREENING BILAT WITH TOMOSYNTHESIS_CAD: ICD-10-PCS | Mod: 26,HCNC,, | Performed by: RADIOLOGY

## 2019-09-24 PROCEDURE — 99999 PR PBB SHADOW E&M-EST. PATIENT-LVL II: CPT | Mod: PBBFAC,HCNC,, | Performed by: OPTOMETRIST

## 2019-09-24 PROCEDURE — 99999 PR PBB SHADOW E&M-EST. PATIENT-LVL II: ICD-10-PCS | Mod: PBBFAC,HCNC,, | Performed by: OPTOMETRIST

## 2019-09-24 PROCEDURE — 92004 COMPRE OPH EXAM NEW PT 1/>: CPT | Mod: HCNC,S$GLB,, | Performed by: OPTOMETRIST

## 2019-09-24 PROCEDURE — 77067 SCR MAMMO BI INCL CAD: CPT | Mod: TC,HCNC

## 2019-09-24 PROCEDURE — 92015 DETERMINE REFRACTIVE STATE: CPT | Mod: HCNC,S$GLB,, | Performed by: OPTOMETRIST

## 2019-09-24 NOTE — LETTER
September 24, 2019      Vic Joe, NP  [address] [place] [address]           [facility]  [address]  [place] [address]  Phone: [phone]  Fax: [phone]          Patient: Lydia Danielson   MR Number: [id number]   YOB: 1955   Date of Visit: 9/24/2019       Dear Vic Joe:    Thank you for referring Lydia Danielson to me for evaluation. Attached you will find relevant portions of my assessment and plan of care.    If you have questions, please do not hesitate to call me. I look forward to following Lydia Danielson along with you.    Sincerely,    Kelly Reveles, OD    Enclosure  CC:  No Recipients    If you would like to receive this communication electronically, please contact [email or web address] or [phone] to request more information on [employer] Link access.    For providers and/or their staff who would like to refer a patient to [facility], please contact us through our one-stop-shop provider referral line, [facility] , at [phone].    If you feel you have received this communication in error or would no longer like to receive these types of communications, please e-mail [email or web address]

## 2019-09-24 NOTE — PROGRESS NOTES
HPI     Diabetic Eye Exam      Additional comments: Yearly              Comments     NP to [facility]  [facility]  Patient here for yearly eye exam  No noticeable changes in vision since last eye exam  Wears PAL glasses full-time updated 2 years ago  No other complaints  No drops  1. DM          Last edited by Michelle Robertson, PCT on 9/24/2019 10:26 AM. (History)              Assessment /Plan     For exam results, see Encounter Report.    Type 2 diabetes mellitus without retinopathy    Essential hypertension    Glaucoma screening    Hyperopia with presbyopia of both eyes      No diabetic retinopathy in either eye.  Glaucoma screening negative in both eyes.  Updated glasses prescription.  Discussed with patient the possibility of refractive error change when blood sugar is down to normal levels.  Return to clinic prescription recheck if vision changes.  Return to clinic 1 yr.

## 2019-10-28 DIAGNOSIS — E11.65 UNCONTROLLED TYPE 2 DIABETES MELLITUS WITH HYPERGLYCEMIA, WITH LONG-TERM CURRENT USE OF INSULIN: Primary | ICD-10-CM

## 2019-10-28 DIAGNOSIS — Z79.4 UNCONTROLLED TYPE 2 DIABETES MELLITUS WITH HYPERGLYCEMIA, WITH LONG-TERM CURRENT USE OF INSULIN: Primary | ICD-10-CM

## 2019-10-28 RX ORDER — INSULIN GLARGINE 300 [IU]/ML
26 INJECTION, SOLUTION SUBCUTANEOUS NIGHTLY
Qty: 9 ML | Refills: 1 | Status: SHIPPED | OUTPATIENT
Start: 2019-10-28 | End: 2019-12-30

## 2019-10-28 RX ORDER — PEN NEEDLE, DIABETIC 30 GX3/16"
NEEDLE, DISPOSABLE MISCELLANEOUS
Qty: 100 EACH | Refills: 2 | Status: SHIPPED | OUTPATIENT
Start: 2019-10-28

## 2019-12-09 ENCOUNTER — LAB VISIT (OUTPATIENT)
Dept: LAB | Facility: HOSPITAL | Age: 64
End: 2019-12-09
Payer: MEDICARE

## 2019-12-09 ENCOUNTER — OFFICE VISIT (OUTPATIENT)
Dept: FAMILY MEDICINE | Facility: CLINIC | Age: 64
End: 2019-12-09
Payer: MEDICARE

## 2019-12-09 VITALS
BODY MASS INDEX: 25.45 KG/M2 | SYSTOLIC BLOOD PRESSURE: 176 MMHG | OXYGEN SATURATION: 98 % | HEIGHT: 65 IN | DIASTOLIC BLOOD PRESSURE: 100 MMHG | TEMPERATURE: 98 F | WEIGHT: 152.75 LBS | HEART RATE: 66 BPM

## 2019-12-09 DIAGNOSIS — R79.89 ABNORMAL TSH: ICD-10-CM

## 2019-12-09 DIAGNOSIS — I15.2 HYPERTENSION ASSOCIATED WITH DIABETES: ICD-10-CM

## 2019-12-09 DIAGNOSIS — Z79.4 UNCONTROLLED TYPE 2 DIABETES MELLITUS WITH HYPERGLYCEMIA, WITH LONG-TERM CURRENT USE OF INSULIN: ICD-10-CM

## 2019-12-09 DIAGNOSIS — E11.65 UNCONTROLLED TYPE 2 DIABETES MELLITUS WITH HYPERGLYCEMIA, WITH LONG-TERM CURRENT USE OF INSULIN: Primary | ICD-10-CM

## 2019-12-09 DIAGNOSIS — Z79.4 UNCONTROLLED TYPE 2 DIABETES MELLITUS WITH HYPERGLYCEMIA, WITH LONG-TERM CURRENT USE OF INSULIN: Primary | ICD-10-CM

## 2019-12-09 DIAGNOSIS — I25.10 CORONARY ARTERY DISEASE INVOLVING NATIVE CORONARY ARTERY OF NATIVE HEART WITHOUT ANGINA PECTORIS: Chronic | ICD-10-CM

## 2019-12-09 DIAGNOSIS — E11.59 HYPERTENSION ASSOCIATED WITH DIABETES: ICD-10-CM

## 2019-12-09 DIAGNOSIS — E04.2 MULTINODULAR GOITER: Chronic | ICD-10-CM

## 2019-12-09 DIAGNOSIS — M47.812 CERVICAL ARTHRITIS: ICD-10-CM

## 2019-12-09 DIAGNOSIS — E11.65 UNCONTROLLED TYPE 2 DIABETES MELLITUS WITH HYPERGLYCEMIA, WITH LONG-TERM CURRENT USE OF INSULIN: ICD-10-CM

## 2019-12-09 PROCEDURE — 99214 PR OFFICE/OUTPT VISIT, EST, LEVL IV, 30-39 MIN: ICD-10-PCS | Mod: HCNC,S$GLB,, | Performed by: FAMILY MEDICINE

## 2019-12-09 PROCEDURE — 99214 OFFICE O/P EST MOD 30 MIN: CPT | Mod: HCNC,S$GLB,, | Performed by: FAMILY MEDICINE

## 2019-12-09 PROCEDURE — 36415 COLL VENOUS BLD VENIPUNCTURE: CPT | Mod: HCNC,PO

## 2019-12-09 PROCEDURE — 84443 ASSAY THYROID STIM HORMONE: CPT | Mod: HCNC

## 2019-12-09 PROCEDURE — 84439 ASSAY OF FREE THYROXINE: CPT | Mod: HCNC

## 2019-12-09 PROCEDURE — 83036 HEMOGLOBIN GLYCOSYLATED A1C: CPT | Mod: HCNC

## 2019-12-09 RX ORDER — MELOXICAM 15 MG/1
15 TABLET ORAL DAILY PRN
Qty: 30 TABLET | Refills: 2 | Status: SHIPPED | OUTPATIENT
Start: 2019-12-09 | End: 2020-05-14

## 2019-12-09 NOTE — PROGRESS NOTES
Lydia Danielson    Chief Complaint   Patient presents with    Diabetes       History of Present Illness:   Ms. Danielson comes in today for diabetes follow-up.  She is not fasting and has not taken medications today.  She states she exercises 5 times per week at work ([employer]) by moving around and monitors her diet. She reports home blood pressure levels range 120/80s but usually goes when she has doctor visits.  She performs home glucose checks daily with levels ranging 120 to 80s.  She requests refill of glucometer strips.     She states she performs home glucose checks with levels ranging 120-180's and states she has been taking glimepiride and Toujeo daily as directed. She reports having polyuria, urine frequency for few months but denies having polyphagia or polydipsia.    She states she has not been taking Bidil as she states it causes her to feel sluggish.     She reports cold and hot intolerance.    She reports having pain due to arthritis in her neck and right shoulder and states she takes Tylenol without help.  She has history of GERD but not PUD or kidney disease.     Otherwise, she denies having fever, chills, fatigue, appetite change; shortness of breath, cough, wheezing; chest pain, palpitations, leg swelling; abdominal pain, nausea, vomiting, diarrhea, constipation; polydipsia, polyphagia; other unusual urinary symptoms; back pain; headaches; anxiety or depression.     She saw cardiologist Dr. Montez on February 10, 2017 for hypertension, coronary artery disease surveillance and was prescribed by josiane to take twice daily in addition to taking other blood pressure medications.  She had follow-up with JESSIE Rubio on March 28, 2017 with elevated blood pressure.     She saw ENT Dr. Medina on March 24, 2017 for multinodular goiter and subsequently had FNA performed on April 19, 2017 which was benign.  He advised her to follow-up in 6 months for thyroid ultrasound.    Labs:                     WBC                       5.94                09/17/2019                 HGB                      14.0                09/17/2019                 HCT                      43.1                09/17/2019                 PLT                      366 (H)             09/17/2019                 CHOL                     184                 09/17/2019                 TRIG                     81                  09/17/2019                 HDL                      64                  09/17/2019                 ALT                      19                  09/17/2019                 AST                      19                  09/17/2019                 NA                       139                 09/17/2019                 K                        4.7                 09/17/2019                 CL                       102                 09/17/2019                 CREATININE               1.1                 09/17/2019                 BUN                      13                  09/17/2019                 CO2                      28                  09/17/2019                 TSH                      0.170 (L)           09/17/2019                 INR                      1.0                 12/04/2017                 HGBA1C                   13.5 (H)            09/17/2019            LDLCALC                  103.8               09/17/2019                Current Outpatient Medications   Medication Sig    aspirin (ECOTRIN) 81 MG EC tablet Take 81 mg by mouth once daily.    atorvastatin (LIPITOR) 40 MG tablet Take 1 tablet (40 mg total) by mouth nightly.    benazepril (LOTENSIN) 40 MG tablet Take 1 tablet (40 mg total) by mouth once daily.    blood sugar diagnostic (ONETOUCH ULTRA TEST) Strp 1 strip by Misc.(Non-Drug; Combo Route) route 2 (two) times daily.    cyclobenzaprine (FLEXERIL) 10 MG tablet Take 1 tablet (10 mg total) by mouth nightly.    diazePAM (VALIUM) 2 MG tablet Take 1 tablet (2 mg total) by mouth once as needed for  "Anxiety (30 minutes prior to procedure).    fluticasone (FLONASE) 50 mcg/actuation nasal spray 2 Spray, Suspension Nasal Every day.  each nostril    glimepiride (AMARYL) 4 MG tablet TAKE 1 TABLET BY MOUTH TWICE DAILY    insulin glargine, TOUJEO, (TOUJEO SOLOSTAR U-300 INSULIN) 300 unit/mL (1.5 mL) InPn pen Inject 26 Units into the skin every evening.    pen needle, diabetic 32 gauge x 5/32" Ndle Use daily with Lantus    sertraline (ZOLOFT) 50 MG tablet Take 1 tablet (50 mg total) by mouth once daily. For anxiety    isosorbide-hydrALAZINE 20-37.5 mg (BIDIL) 20-37.5 mg Tab Take 1 tablet by mouth 2 (two) times daily. (Patient not taking: Reported on 12/9/2019)    triamcinolone acetonide 0.1% (KENALOG) 0.1 % cream Apply topically 2 (two) times daily as needed.       Review of Systems   Constitutional: Negative for activity change, appetite change, chills, fatigue and fever.        Weight 67.9 kg (149 lb 11.1 oz) at September 17, 2019 visit.   Eyes: Negative for visual disturbance.   Respiratory: Negative for cough, shortness of breath and wheezing.    Cardiovascular: Negative for chest pain, palpitations and leg swelling.   Gastrointestinal: Negative for abdominal pain, constipation, diarrhea, nausea and vomiting.   Endocrine: Positive for cold intolerance, heat intolerance and polyuria. Negative for polydipsia and polyphagia.        See history of present illness.   Genitourinary: Positive for frequency. Negative for difficulty urinating.   Musculoskeletal: Positive for myalgias and neck pain. Negative for back pain.   Neurological: Negative for numbness and headaches.   Psychiatric/Behavioral: Negative for dysphoric mood and suicidal ideas. The patient is not nervous/anxious.         Negative for homicidal ideas.       Objective:  Physical Exam   Constitutional: She is oriented to person, place, and time. She appears well-developed and well-nourished. No distress.   Pleasant.   Neck: Normal range of motion. " Neck supple. No thyromegaly present.   Cardiovascular: Normal rate, regular rhythm, normal heart sounds and intact distal pulses.   No murmur heard.  Pulses:       Dorsalis pedis pulses are 3+ on the right side, and 3+ on the left side.        Posterior tibial pulses are 3+ on the right side, and 3+ on the left side.   Pulmonary/Chest: Effort normal and breath sounds normal. No respiratory distress. She has no wheezes.   Abdominal: Soft. Bowel sounds are normal. She exhibits no distension and no mass. There is no tenderness. There is no rebound and no guarding.   Musculoskeletal: Normal range of motion. She exhibits tenderness. She exhibits no edema.   Feet look okay without ulcerations or skin breaks.  She is ambulatory without problems. Slightly tender at right shoulder and right neck with full range of motion noted.   Feet:   Right Foot:   Protective Sensation: 5 sites tested. 5 sites sensed.   Skin Integrity: Negative for ulcer or skin breakdown.   Left Foot:   Protective Sensation: 5 sites tested. 5 sites sensed.   Skin Integrity: Negative for ulcer or skin breakdown.   Lymphadenopathy:     She has no cervical adenopathy.   Neurological: She is alert and oriented to person, place, and time. She has normal reflexes.   Skin: She is not diaphoretic.   Psychiatric: She has a normal mood and affect. Her behavior is normal. Judgment and thought content normal.   Vitals reviewed.      ASSESSMENT:  1. Uncontrolled type 2 diabetes mellitus with hyperglycemia, with long-term current use of insulin    2. Hypertension associated with diabetes    3. Coronary artery disease involving native coronary artery of native heart without angina pectoris    4. Abnormal TSH    5. Multinodular goiter    6. Cervical arthritis        PLAN:  Lydia was seen today for diabetes.    Diagnoses and all orders for this visit:    Uncontrolled type 2 diabetes mellitus with hyperglycemia, with long-term current use of insulin  -     Hemoglobin  A1c; Future  -     blood sugar diagnostic (ONETOUCH ULTRA TEST) Strp; 1 strip by Misc.(Non-Drug; Combo Route) route 2 (two) times daily.    Hypertension associated with diabetes  -     Ambulatory referral to Cardiology    Coronary artery disease involving native coronary artery of native heart without angina pectoris  -     Ambulatory referral to Cardiology    Abnormal TSH  -     TSH; Future    Multinodular goiter  -     TSH; Future    Cervical arthritis  -     meloxicam (MOBIC) 15 MG tablet; Take 1 tablet (15 mg total) by mouth daily as needed for Pain (with food).       Patient advised to call for results.  Continue current medications (including encouraged patient to take Bidil until at least evaluated by cardiology), follow low sodium, low cholesterol, low carb diet, daily walks.  Prescription refills as noted above.  Keep follow up with specialists.  Follow up in about 3 months (around 3/9/2020) for diabetes and hypertension follow up.

## 2019-12-10 LAB
ESTIMATED AVG GLUCOSE: 335 MG/DL (ref 68–131)
HBA1C MFR BLD HPLC: 13.3 % (ref 4–5.6)
T4 FREE SERPL-MCNC: 1.01 NG/DL (ref 0.71–1.51)
TSH SERPL DL<=0.005 MIU/L-ACNC: 0.22 UIU/ML (ref 0.4–4)

## 2019-12-13 DIAGNOSIS — I25.10 CORONARY ARTERY DISEASE WITHOUT ANGINA PECTORIS, UNSPECIFIED VESSEL OR LESION TYPE, UNSPECIFIED WHETHER NATIVE OR TRANSPLANTED HEART: Primary | ICD-10-CM

## 2019-12-29 DIAGNOSIS — E11.65 UNCONTROLLED TYPE 2 DIABETES MELLITUS WITH HYPERGLYCEMIA, WITH LONG-TERM CURRENT USE OF INSULIN: ICD-10-CM

## 2019-12-29 DIAGNOSIS — Z79.4 UNCONTROLLED TYPE 2 DIABETES MELLITUS WITH HYPERGLYCEMIA, WITH LONG-TERM CURRENT USE OF INSULIN: ICD-10-CM

## 2019-12-30 RX ORDER — INSULIN GLARGINE 300 [IU]/ML
32 INJECTION, SOLUTION SUBCUTANEOUS NIGHTLY
Qty: 9 ML | Refills: 1 | Status: SHIPPED | OUTPATIENT
Start: 2019-12-30 | End: 2020-10-05

## 2020-01-16 ENCOUNTER — TELEPHONE (OUTPATIENT)
Dept: FAMILY MEDICINE | Facility: CLINIC | Age: 65
End: 2020-01-16

## 2020-01-16 NOTE — TELEPHONE ENCOUNTER
----- Message from Aurora Waite sent at 1/16/2020  1:57 PM CST -----  Contact: pt  Type:  Test Results    Who Called: Patient  Name of Test (Lab/Mammo/Etc): Lab Results  Date of Test: 12/9  Ordering Provider: Dr Bowen  Where the test was performed: [facility]  Would the patient rather a call back or a response via [facility]? Call back  Best Call Back Number: [phone]  Additional Information: pt also have questions regarding medication

## 2020-01-16 NOTE — TELEPHONE ENCOUNTER
Advise pt lab results show high diabetes and chronic, stable borderline thyroid level. Continue medications but increase Toujeo to 32 units daily. Thanks for call.

## 2020-01-17 NOTE — TELEPHONE ENCOUNTER
patient informed of results. Patient states she haven't insulin in over a month because [facility] told her they didn't have a prescription for it.    I contacted pharmacy. Rx will be refilled. Copay is $40.00.  Patient informed of this information and states she will call pharmacy and pay the copay.

## 2020-02-20 DIAGNOSIS — E11.65 UNCONTROLLED TYPE 2 DIABETES MELLITUS WITH HYPERGLYCEMIA, WITH LONG-TERM CURRENT USE OF INSULIN: ICD-10-CM

## 2020-02-20 DIAGNOSIS — Z79.4 UNCONTROLLED TYPE 2 DIABETES MELLITUS WITH HYPERGLYCEMIA, WITH LONG-TERM CURRENT USE OF INSULIN: ICD-10-CM

## 2020-02-20 RX ORDER — GLIMEPIRIDE 4 MG/1
TABLET ORAL
Qty: 180 TABLET | Refills: 1 | Status: SHIPPED | OUTPATIENT
Start: 2020-02-20 | End: 2020-08-25

## 2020-02-24 DIAGNOSIS — I10 ESSENTIAL HYPERTENSION: Chronic | ICD-10-CM

## 2020-02-24 RX ORDER — BENAZEPRIL HYDROCHLORIDE 40 MG/1
40 TABLET ORAL DAILY
Qty: 90 TABLET | Refills: 1 | Status: SHIPPED | OUTPATIENT
Start: 2020-02-24 | End: 2020-05-18 | Stop reason: SDUPTHER

## 2020-02-24 NOTE — TELEPHONE ENCOUNTER
----- Message from Ayah Hinojosa sent at 2/24/2020 12:49 PM CST -----  Contact: Self  Type:  RX Refill Request    Who Called: Lydia Negro or New Rx:refill  RX Name and Strength: benazepril (LOTENSIN) 40 MG tablet   How is the patient currently taking it? (ex. 1XDay):1xday  Is this a 30 day or 90 day RX:90  Preferred Pharmacy with phone number:    [id number] - [place] - [address] AT [facility]  [address] [place] [address]  Phone: [phone] Fax: [phone]        Local or Mail Order:local  Ordering Provider:Andrés  Would the patient rather a call back or a response via [facility]? call  Best Call Back Number:[phone]  Additional Information:

## 2020-05-11 ENCOUNTER — OFFICE VISIT (OUTPATIENT)
Dept: FAMILY MEDICINE | Facility: CLINIC | Age: 65
End: 2020-05-11
Payer: MEDICARE

## 2020-05-11 ENCOUNTER — TELEPHONE (OUTPATIENT)
Dept: FAMILY MEDICINE | Facility: CLINIC | Age: 65
End: 2020-05-11

## 2020-05-11 DIAGNOSIS — M54.50 ACUTE RIGHT-SIDED LOW BACK PAIN WITHOUT SCIATICA: ICD-10-CM

## 2020-05-11 DIAGNOSIS — N30.00 ACUTE CYSTITIS WITHOUT HEMATURIA: Primary | ICD-10-CM

## 2020-05-11 PROCEDURE — 99442 PR PHYSICIAN TELEPHONE EVALUATION 11-20 MIN: CPT | Mod: HCNC,95,, | Performed by: FAMILY MEDICINE

## 2020-05-11 PROCEDURE — 99442 PR PHYSICIAN TELEPHONE EVALUATION 11-20 MIN: ICD-10-PCS | Mod: HCNC,95,, | Performed by: FAMILY MEDICINE

## 2020-05-11 RX ORDER — CIPROFLOXACIN 500 MG/1
500 TABLET ORAL 2 TIMES DAILY
Qty: 10 TABLET | Refills: 0 | Status: SHIPPED | OUTPATIENT
Start: 2020-05-11 | End: 2020-05-16

## 2020-05-11 NOTE — TELEPHONE ENCOUNTER
----- Message from Sandra Deutsch sent at 5/11/2020  9:01 AM CDT -----  Contact: pt  States she is having RT side pain and she can't sleep. She would like to see if she can get an antibiotic called in. Please call pt [phone] . Thank you

## 2020-05-11 NOTE — PROGRESS NOTES
Lydia Danielson    Chief Complaint   Patient presents with    Side pain       History of Present Illness:   Established Patient - Audio Only Telehealth Visit     The patient location is: Home (during Covid-19 pandemic)  The chief complaint leading to consultation is: Side (back) pain  Visit type: Virtual visit with audio only (telephone)  Total time spent with patient: 11 minutes       The reason for the audio only service rather than synchronous audio and video virtual visit was related to technical difficulties or patient preference/necessity.     Each patient to whom I provide medical services by telemedicine is:  (1) informed of the relationship between the physician and patient and the respective role of any other health care provider with respect to management of the patient; and (2) notified that they may decline to receive medical services by telemedicine and may withdraw from such care at any time. Patient verbally consented to receive this service via voice-only telephone call.     This service was not originating from a related E/M service provided within the previous 7 days nor will  to an E/M service or procedure within the next 24 hours or my soonest available appointment.  Prevailing standard of care was able to be met in this audio-only visit.         HPI:     Ms. Danielson speaks with me today with complaint of having right lower back pain of constant nature for 2 days.  She states pain is 10/10 on pain scale now.  She states she took regular strength Tylenol and Mobic yesterday and today with little to no help.  She states she has been applying heating pad with little help.  She reports having urine frequency but denies having other unusual urinary symptoms, fever, chills, fatigue, appetite change, rashes, chest pain, palpitations, leg swelling, shortness of breath, cough, wheezing, abdominal pain, nausea, vomiting, diarrhea, constipation, headaches, anxiety, depression, homicidal or  suicidal thoughts.  She denies having associated trauma.  She states she has been drinking water with cranberry juice with little help.      She reports glucose levels range 120's.    She reports history of UTI and states she has had kidney stones as a child.    Labs:                    WBC                      5.94                09/17/2019                 HGB                      14.0                09/17/2019                 HCT                      43.1                09/17/2019                 PLT                      366 (H)             09/17/2019                 CHOL                     184                 09/17/2019                 TRIG                     81                  09/17/2019                 HDL                      64                  09/17/2019                 ALT                      19                  09/17/2019                 AST                      19                  09/17/2019                 NA                       139                 09/17/2019                 K                        4.7                 09/17/2019                 CL                       102                 09/17/2019                 CREATININE               1.1                 09/17/2019                 BUN                      13                  09/17/2019                 CO2                      28                  09/17/2019                 TSH                      0.221 (L)           12/09/2019                 INR                      1.0                 12/04/2017                 HGBA1C                   13.3 (H)            12/09/2019              LDLCALC                  103.8               09/17/2019                Current Outpatient Medications   Medication Sig    aspirin (ECOTRIN) 81 MG EC tablet Take 81 mg by mouth once daily.    atorvastatin (LIPITOR) 40 MG tablet Take 1 tablet (40 mg total) by mouth nightly.    benazepriL (LOTENSIN) 40 MG tablet Take 1 tablet (40 mg total) by mouth once daily.     "blood sugar diagnostic (ONETOUCH ULTRA TEST) Strp 1 strip by Misc.(Non-Drug; Combo Route) route 2 (two) times daily.    cyclobenzaprine (FLEXERIL) 10 MG tablet Take 1 tablet (10 mg total) by mouth nightly.    diazePAM (VALIUM) 2 MG tablet Take 1 tablet (2 mg total) by mouth once as needed for Anxiety (30 minutes prior to procedure).    fluticasone (FLONASE) 50 mcg/actuation nasal spray 2 Spray, Suspension Nasal Every day.  each nostril    glimepiride (AMARYL) 4 MG tablet TAKE 1 TABLET BY MOUTH TWICE DAILY    insulin glargine, TOUJEO, (TOUJEO SOLOSTAR U-300 INSULIN) 300 unit/mL (1.5 mL) InPn pen Inject 32 Units into the skin every evening.    isosorbide-hydrALAZINE 20-37.5 mg (BIDIL) 20-37.5 mg Tab Take 1 tablet by mouth 2 (two) times daily.    meloxicam (MOBIC) 15 MG tablet Take 1 tablet (15 mg total) by mouth daily as needed for Pain (with food).    pen needle, diabetic 32 gauge x 5/32" Ndle Use daily with Lantus    sertraline (ZOLOFT) 50 MG tablet Take 1 tablet (50 mg total) by mouth once daily. For anxiety    triamcinolone acetonide 0.1% (KENALOG) 0.1 % cream Apply topically 2 (two) times daily as needed.         Review of Systems   Constitutional: Negative for appetite change, chills, fatigue and fever.   Respiratory: Negative for cough, shortness of breath and wheezing.    Cardiovascular: Negative for chest pain, palpitations and leg swelling.   Gastrointestinal: Negative for abdominal pain, constipation, diarrhea, nausea and vomiting.   Endocrine:        See history of present illness.   Genitourinary: Positive for frequency. Negative for difficulty urinating, dysuria, flank pain and hematuria.   Musculoskeletal: Positive for back pain.   Skin: Negative for rash.   Neurological: Negative for headaches.   Psychiatric/Behavioral: Negative for dysphoric mood and suicidal ideas. The patient is not nervous/anxious.         Negative for homicidal ideas.       Objective:  Physical Exam   Constitutional: She " is oriented to person, place, and time.   Pleasant.   Neurological: She is alert and oriented to person, place, and time.   Psychiatric: She has a normal mood and affect. Her behavior is normal. Judgment and thought content normal.       ASSESSMENT:  1. Acute cystitis without hematuria    2. Acute right-sided low back pain without sciatica        PLAN:  Lydia was seen today for side pain.    Diagnoses and all orders for this visit:    Acute cystitis without hematuria  -     ciprofloxacin HCl (CIPRO) 500 MG tablet; Take 1 tablet (500 mg total) by mouth 2 (two) times daily. for 5 days    Acute right-sided low back pain without sciatica      Add Cipro 500 mg twice daily x 5 days as directed; medication precautions discussed with patient.  Continue current medications, follow low sodium, low cholesterol, low carb diet, daily walks.  Continue adequate hydration.  Follow up if symptoms worsen or fail to improve.

## 2020-05-14 DIAGNOSIS — M47.812 CERVICAL ARTHRITIS: ICD-10-CM

## 2020-05-14 RX ORDER — MELOXICAM 15 MG/1
TABLET ORAL
Qty: 30 TABLET | Refills: 2 | Status: SHIPPED | OUTPATIENT
Start: 2020-05-14 | End: 2020-08-11

## 2020-05-18 ENCOUNTER — OFFICE VISIT (OUTPATIENT)
Dept: FAMILY MEDICINE | Facility: CLINIC | Age: 65
End: 2020-05-18
Payer: MEDICARE

## 2020-05-18 ENCOUNTER — LAB VISIT (OUTPATIENT)
Dept: LAB | Facility: HOSPITAL | Age: 65
End: 2020-05-18
Payer: MEDICARE

## 2020-05-18 VITALS
RESPIRATION RATE: 18 BRPM | WEIGHT: 155.88 LBS | BODY MASS INDEX: 25.97 KG/M2 | OXYGEN SATURATION: 98 % | HEART RATE: 86 BPM | SYSTOLIC BLOOD PRESSURE: 242 MMHG | DIASTOLIC BLOOD PRESSURE: 120 MMHG | TEMPERATURE: 99 F | HEIGHT: 65 IN

## 2020-05-18 DIAGNOSIS — I15.2 HYPERTENSION ASSOCIATED WITH DIABETES: Chronic | ICD-10-CM

## 2020-05-18 DIAGNOSIS — E11.59 HYPERTENSION ASSOCIATED WITH DIABETES: Chronic | ICD-10-CM

## 2020-05-18 DIAGNOSIS — E78.2 COMBINED HYPERLIPIDEMIA ASSOCIATED WITH TYPE 2 DIABETES MELLITUS: ICD-10-CM

## 2020-05-18 DIAGNOSIS — E11.69 COMBINED HYPERLIPIDEMIA ASSOCIATED WITH TYPE 2 DIABETES MELLITUS: ICD-10-CM

## 2020-05-18 DIAGNOSIS — R79.89 ABNORMAL TSH: ICD-10-CM

## 2020-05-18 DIAGNOSIS — I16.0 HYPERTENSIVE URGENCY: ICD-10-CM

## 2020-05-18 DIAGNOSIS — I25.10 CORONARY ARTERY DISEASE INVOLVING NATIVE CORONARY ARTERY OF NATIVE HEART WITHOUT ANGINA PECTORIS: Chronic | ICD-10-CM

## 2020-05-18 DIAGNOSIS — E04.2 MULTINODULAR GOITER: Chronic | ICD-10-CM

## 2020-05-18 DIAGNOSIS — Z79.4 UNCONTROLLED TYPE 2 DIABETES MELLITUS WITH HYPERGLYCEMIA, WITH LONG-TERM CURRENT USE OF INSULIN: ICD-10-CM

## 2020-05-18 DIAGNOSIS — E11.65 UNCONTROLLED TYPE 2 DIABETES MELLITUS WITH HYPERGLYCEMIA, WITH LONG-TERM CURRENT USE OF INSULIN: ICD-10-CM

## 2020-05-18 PROCEDURE — 84443 ASSAY THYROID STIM HORMONE: CPT | Mod: HCNC

## 2020-05-18 PROCEDURE — 3077F PR MOST RECENT SYSTOLIC BLOOD PRESSURE >= 140 MM HG: ICD-10-PCS | Mod: HCNC,CPTII,S$GLB, | Performed by: FAMILY MEDICINE

## 2020-05-18 PROCEDURE — 3077F SYST BP >= 140 MM HG: CPT | Mod: HCNC,CPTII,S$GLB, | Performed by: FAMILY MEDICINE

## 2020-05-18 PROCEDURE — 99499 UNLISTED E&M SERVICE: CPT | Mod: HCNC,S$GLB,, | Performed by: FAMILY MEDICINE

## 2020-05-18 PROCEDURE — 3046F HEMOGLOBIN A1C LEVEL >9.0%: CPT | Mod: HCNC,CPTII,S$GLB, | Performed by: FAMILY MEDICINE

## 2020-05-18 PROCEDURE — 3080F PR MOST RECENT DIASTOLIC BLOOD PRESSURE >= 90 MM HG: ICD-10-PCS | Mod: HCNC,CPTII,S$GLB, | Performed by: FAMILY MEDICINE

## 2020-05-18 PROCEDURE — 99999 PR PBB SHADOW E&M-EST. PATIENT-LVL IV: ICD-10-PCS | Mod: PBBFAC,HCNC,, | Performed by: FAMILY MEDICINE

## 2020-05-18 PROCEDURE — 83036 HEMOGLOBIN GLYCOSYLATED A1C: CPT | Mod: HCNC

## 2020-05-18 PROCEDURE — 99499 RISK ADDL DX/OHS AUDIT: ICD-10-PCS | Mod: HCNC,S$GLB,, | Performed by: FAMILY MEDICINE

## 2020-05-18 PROCEDURE — 1101F PT FALLS ASSESS-DOCD LE1/YR: CPT | Mod: HCNC,CPTII,S$GLB, | Performed by: FAMILY MEDICINE

## 2020-05-18 PROCEDURE — 84439 ASSAY OF FREE THYROXINE: CPT | Mod: HCNC

## 2020-05-18 PROCEDURE — 3046F PR MOST RECENT HEMOGLOBIN A1C LEVEL > 9.0%: ICD-10-PCS | Mod: HCNC,CPTII,S$GLB, | Performed by: FAMILY MEDICINE

## 2020-05-18 PROCEDURE — 3080F DIAST BP >= 90 MM HG: CPT | Mod: HCNC,CPTII,S$GLB, | Performed by: FAMILY MEDICINE

## 2020-05-18 PROCEDURE — 36415 COLL VENOUS BLD VENIPUNCTURE: CPT | Mod: HCNC,PO

## 2020-05-18 PROCEDURE — 99215 PR OFFICE/OUTPT VISIT, EST, LEVL V, 40-54 MIN: ICD-10-PCS | Mod: HCNC,S$GLB,, | Performed by: FAMILY MEDICINE

## 2020-05-18 PROCEDURE — 3008F BODY MASS INDEX DOCD: CPT | Mod: HCNC,CPTII,S$GLB, | Performed by: FAMILY MEDICINE

## 2020-05-18 PROCEDURE — 1101F PR PT FALLS ASSESS DOC 0-1 FALLS W/OUT INJ PAST YR: ICD-10-PCS | Mod: HCNC,CPTII,S$GLB, | Performed by: FAMILY MEDICINE

## 2020-05-18 PROCEDURE — 3008F PR BODY MASS INDEX (BMI) DOCUMENTED: ICD-10-PCS | Mod: HCNC,CPTII,S$GLB, | Performed by: FAMILY MEDICINE

## 2020-05-18 PROCEDURE — 99215 OFFICE O/P EST HI 40 MIN: CPT | Mod: HCNC,S$GLB,, | Performed by: FAMILY MEDICINE

## 2020-05-18 PROCEDURE — 99999 PR PBB SHADOW E&M-EST. PATIENT-LVL IV: CPT | Mod: PBBFAC,HCNC,, | Performed by: FAMILY MEDICINE

## 2020-05-18 RX ORDER — BENAZEPRIL HYDROCHLORIDE 40 MG/1
40 TABLET ORAL DAILY
Qty: 90 TABLET | Refills: 1 | Status: SHIPPED | OUTPATIENT
Start: 2020-05-18 | End: 2020-10-06 | Stop reason: SDUPTHER

## 2020-05-18 NOTE — PROGRESS NOTES
Lydia Danielson    Chief Complaint   Patient presents with    Diabetes    Hypertension    Follow-up       History of Present Illness:   Ms. Danielson comes in today for diabetes and hypertension follow-up.  She states she is not fasting as she drank coffee at 8:00 a.m. this morning.  She states she usually takes her medications in the morning but has not taken them today.  She states she has been out of benazepril 40 mg daily for 2 days.  As I review medications (bottles she brought with her today) with her, she has not been taking by Bidil twice daily as directed; instead she has been taking it at nighttime only.  She states she thought she was only supposed to take it at night and also states it sometimes causes her to have headache.    She states she monitors her diet but has not been exercising.  She states she does not perform home blood pressure checks but performs home glucose checks with levels ranging 120's.    She reports having chronic urine frequency and chronic back pain; she states she takes Mobic without help but takes Tylenol which sometimes helps.    She reports having slight abdominal pain for few days.    Otherwise, she states she feels fine today.  She denies having fever, chills, fatigue, appetite changes; shortness of breath, cough, wheezing; chest pain, palpitations, leg swelling; nausea, vomiting, diarrhea, constipation; other unusual urinary symptoms; polydipsia, polyphagia, hot or cold intolerance; acute visual changes, numbness, headache; anxiety, depression, homicidal or suicidal thoughts.      She states her sister brought her to her appointment today.    She saw cardiologist Dr. Montez on February 10, 2017 for hypertension, coronary artery disease surveillance and was prescribed by josiane to take twice daily in addition to taking other blood pressure medications.  She had follow-up with JESSIE Rubio on March 28, 2017 with elevated blood pressure.  At her visit with me on December 9,  2019 follow up visit with cardiologist was scheduled; she did not make appointment.     She saw ENT Dr. Medina on March 24, 2017 for multinodular goiter and subsequently had FNA performed on April 19, 2017 which was benign.  He advised her to follow-up in 6 months for thyroid ultrasound.        Labs:                 WBC                      5.94                09/17/2019                 HGB                      14.0                09/17/2019                 HCT                      43.1                09/17/2019                 PLT                      366 (H)             09/17/2019                 CHOL                     184                 09/17/2019                 TRIG                     81                  09/17/2019                 HDL                      64                  09/17/2019                 ALT                      19                  09/17/2019                 AST                      19                  09/17/2019                 NA                       139                 09/17/2019                 K                        4.7                 09/17/2019                 CL                       102                 09/17/2019                 CREATININE               1.1                 09/17/2019                 BUN                      13                  09/17/2019                 CO2                      28                  09/17/2019                 TSH                      0.221 (L)           12/09/2019                 INR                      1.0                 12/04/2017                 HGBA1C                   13.3 (H)            12/09/2019             LDLCALC                  103.8               09/17/2019                 Current Outpatient Medications   Medication Sig    aspirin (ECOTRIN) 81 MG EC tablet Take 81 mg by mouth once daily.    atorvastatin (LIPITOR) 40 MG tablet Take 1 tablet (40 mg total) by mouth nightly.    benazepriL (LOTENSIN) 40 MG tablet Take 1 tablet (40 mg  "total) by mouth once daily.    blood sugar diagnostic (ONETOUCH ULTRA TEST) Strp 1 strip by Misc.(Non-Drug; Combo Route) route 2 (two) times daily.    cyclobenzaprine (FLEXERIL) 10 MG tablet Take 1 tablet (10 mg total) by mouth nightly.    diazePAM (VALIUM) 2 MG tablet Take 1 tablet (2 mg total) by mouth once as needed for Anxiety (30 minutes prior to procedure).    fluticasone (FLONASE) 50 mcg/actuation nasal spray 2 Spray, Suspension Nasal Every day.  each nostril    glimepiride (AMARYL) 4 MG tablet TAKE 1 TABLET BY MOUTH TWICE DAILY    insulin glargine, TOUJEO, (TOUJEO SOLOSTAR U-300 INSULIN) 300 unit/mL (1.5 mL) InPn pen Inject 32 Units into the skin every evening.    isosorbide-hydrALAZINE 20-37.5 mg (BIDIL) 20-37.5 mg Tab Take 1 tablet by mouth 2 (two) times daily. (Patient taking differently: Take 1 tablet by mouth once daily. )    meloxicam (MOBIC) 15 MG tablet TAKE 1 TABLET(15 MG) BY MOUTH DAILY WITH FOOD AS NEEDED FOR PAIN    pen needle, diabetic 32 gauge x 5/32" Ndle Use daily with Lantus    sertraline (ZOLOFT) 50 MG tablet Take 1 tablet (50 mg total) by mouth once daily. For anxiety       Review of Systems   Constitutional: Negative for activity change, appetite change, chills, fatigue and fever.        Weight 69.3 kg (152 lb 12.5 oz) at December 9, 2019 visit.   Eyes: Negative for visual disturbance.   Respiratory: Negative for cough, shortness of breath and wheezing.    Cardiovascular: Negative for chest pain, palpitations and leg swelling.        See history of present illness.   Gastrointestinal: Negative for abdominal pain, constipation, diarrhea, nausea and vomiting.   Endocrine: Positive for polyuria. Negative for cold intolerance, heat intolerance, polydipsia and polyphagia.        See history of present illness.   Genitourinary: Positive for frequency. Negative for difficulty urinating.   Musculoskeletal: Positive for back pain.   Neurological: Negative for numbness and headaches. "   Psychiatric/Behavioral: Negative for dysphoric mood and suicidal ideas. The patient is not nervous/anxious.         Negative for homicidal ideas.       Objective:  Physical Exam   Constitutional: She is oriented to person, place, and time. She appears well-developed and well-nourished. No distress.   Pleasant.   Neck: Normal range of motion. Neck supple. No thyromegaly present.   Cardiovascular: Normal rate, regular rhythm, normal heart sounds and intact distal pulses.   No murmur heard.  Pulses:       Dorsalis pedis pulses are 3+ on the right side, and 3+ on the left side.        Posterior tibial pulses are 3+ on the right side, and 3+ on the left side.   Pulmonary/Chest: Effort normal and breath sounds normal. No respiratory distress. She has no wheezes.   Abdominal: Soft. Bowel sounds are normal. She exhibits no distension and no mass. There is tenderness. There is no rebound and no guarding.   Slightly tender at right upper quadrant abdomen without acute abdomen noted.   Musculoskeletal: Normal range of motion. She exhibits no edema or tenderness.   Feet look okay without ulcerations or skin breaks.  She is ambulatory without problems.    Feet:   Right Foot:   Protective Sensation: 5 sites tested. 5 sites sensed.   Skin Integrity: Negative for ulcer or skin breakdown.   Left Foot:   Protective Sensation: 5 sites tested. 5 sites sensed.   Skin Integrity: Negative for ulcer or skin breakdown.   Lymphadenopathy:     She has no cervical adenopathy.   Neurological: She is alert and oriented to person, place, and time. She has normal reflexes.   Skin: She is not diaphoretic.   Psychiatric: She has a normal mood and affect. Her behavior is normal. Judgment and thought content normal.   Vitals reviewed.      ASSESSMENT:  1. Hypertensive urgency    2. Hypertension associated with diabetes    3. Uncontrolled type 2 diabetes mellitus with hyperglycemia, with long-term current use of insulin    4. Combined hyperlipidemia  associated with type 2 diabetes mellitus    5. Coronary artery disease involving native coronary artery of native heart without angina pectoris    6. Multinodular goiter    7. Abnormal TSH        PLAN:  Lydia was seen today for diabetes, hypertension and follow-up.    Diagnoses and all orders for this visit:    Hypertensive urgency    Hypertension associated with diabetes  -     TSH; Future  -     benazepriL (LOTENSIN) 40 MG tablet; Take 1 tablet (40 mg total) by mouth once daily.    Uncontrolled type 2 diabetes mellitus with hyperglycemia, with long-term current use of insulin  -     Hemoglobin A1C; Future    Combined hyperlipidemia associated with type 2 diabetes mellitus    Coronary artery disease involving native coronary artery of native heart without angina pectoris    Multinodular goiter    Abnormal TSH  -     TSH; Future      I stressed with patient importance of compliance with medication therapy; however, I advised patient to go to ER ASAP for hypertension urgency.  Patient advised to call for results.  Continue current medications (including advised patient to take Bidil twice daily as directed), follow low sodium, low cholesterol, low carb diet, daily walks.  Prescription refill as noted above.  Keep follow up with specialists.  Flu shot this fall.  Follow up in about 4 months (around 9/17/2020) for pyhysical. But, see me sooner if no improvement or worsening symptoms noted.

## 2020-05-19 ENCOUNTER — TELEPHONE (OUTPATIENT)
Dept: FAMILY MEDICINE | Facility: CLINIC | Age: 65
End: 2020-05-19

## 2020-05-19 LAB
ESTIMATED AVG GLUCOSE: 292 MG/DL (ref 68–131)
HBA1C MFR BLD HPLC: 11.8 % (ref 4–5.6)
T4 FREE SERPL-MCNC: 1.25 NG/DL (ref 0.71–1.51)
TSH SERPL DL<=0.005 MIU/L-ACNC: 0.36 UIU/ML (ref 0.4–4)

## 2020-05-19 NOTE — TELEPHONE ENCOUNTER
----- Message from Maicol Mascorro sent at 5/19/2020  1:36 PM CDT -----  Contact: Self- [phone]  .Type:  Test Results    Who Called: Lydia Danielson    Name of Test (Lab/Mammo/Etc): Lab   Date of Test: 5/18/20  Ordering Provider:    Where the test was performed: [facility]   Would the patient rather a call back or a response via [facility]? Call back   Best Call Back Number: .[phone] (home)   Additional Information:      Thank You,   Maicol Mascorro

## 2020-05-19 NOTE — TELEPHONE ENCOUNTER
----- Message from Avila Mascorro sent at 5/19/2020  2:46 PM CDT -----  ..Type:  Patient Returning Call    Who Called: pt   Who Left Message for Patient:  Does the patient know what this is regarding?: appt   Would the patient rather a call back or a response via [email or web address]? Call back   Best Call Back Number: [phone]  Additional Information:  Pt is returning a call from nurse for results

## 2020-09-18 ENCOUNTER — OFFICE VISIT (OUTPATIENT)
Dept: FAMILY MEDICINE | Facility: CLINIC | Age: 65
End: 2020-09-18
Payer: MEDICARE

## 2020-09-18 ENCOUNTER — LAB VISIT (OUTPATIENT)
Dept: LAB | Facility: HOSPITAL | Age: 65
End: 2020-09-18
Payer: MEDICARE

## 2020-09-18 VITALS
BODY MASS INDEX: 25.4 KG/M2 | SYSTOLIC BLOOD PRESSURE: 172 MMHG | RESPIRATION RATE: 16 BRPM | TEMPERATURE: 98 F | HEART RATE: 77 BPM | DIASTOLIC BLOOD PRESSURE: 90 MMHG | HEIGHT: 65 IN | OXYGEN SATURATION: 97 % | WEIGHT: 152.44 LBS

## 2020-09-18 DIAGNOSIS — E78.2 COMBINED HYPERLIPIDEMIA ASSOCIATED WITH TYPE 2 DIABETES MELLITUS: ICD-10-CM

## 2020-09-18 DIAGNOSIS — E66.3 OVERWEIGHT (BMI 25.0-29.9): ICD-10-CM

## 2020-09-18 DIAGNOSIS — Z79.4 UNCONTROLLED TYPE 2 DIABETES MELLITUS WITH HYPERGLYCEMIA, WITH LONG-TERM CURRENT USE OF INSULIN: ICD-10-CM

## 2020-09-18 DIAGNOSIS — E11.65 UNCONTROLLED TYPE 2 DIABETES MELLITUS WITH HYPERGLYCEMIA, WITH LONG-TERM CURRENT USE OF INSULIN: ICD-10-CM

## 2020-09-18 DIAGNOSIS — Z00.00 ANNUAL PHYSICAL EXAM: Primary | ICD-10-CM

## 2020-09-18 DIAGNOSIS — E11.59 HYPERTENSION ASSOCIATED WITH DIABETES: Chronic | ICD-10-CM

## 2020-09-18 DIAGNOSIS — Z23 NEED FOR INFLUENZA VACCINATION: ICD-10-CM

## 2020-09-18 DIAGNOSIS — I15.2 HYPERTENSION ASSOCIATED WITH DIABETES: Chronic | ICD-10-CM

## 2020-09-18 DIAGNOSIS — F41.9 ANXIETY: ICD-10-CM

## 2020-09-18 DIAGNOSIS — Z23 NEED FOR PROPHYLACTIC VACCINATION AGAINST STREPTOCOCCUS PNEUMONIAE (PNEUMOCOCCUS): ICD-10-CM

## 2020-09-18 DIAGNOSIS — E11.69 COMBINED HYPERLIPIDEMIA ASSOCIATED WITH TYPE 2 DIABETES MELLITUS: ICD-10-CM

## 2020-09-18 DIAGNOSIS — E04.2 MULTINODULAR GOITER: Chronic | ICD-10-CM

## 2020-09-18 DIAGNOSIS — M54.12 CERVICAL RADICULOPATHY: ICD-10-CM

## 2020-09-18 DIAGNOSIS — G47.33 OSA (OBSTRUCTIVE SLEEP APNEA): ICD-10-CM

## 2020-09-18 DIAGNOSIS — I25.10 CORONARY ARTERY DISEASE INVOLVING NATIVE CORONARY ARTERY OF NATIVE HEART WITHOUT ANGINA PECTORIS: Chronic | ICD-10-CM

## 2020-09-18 DIAGNOSIS — Z12.31 VISIT FOR SCREENING MAMMOGRAM: ICD-10-CM

## 2020-09-18 DIAGNOSIS — Z78.0 POSTMENOPAUSAL: ICD-10-CM

## 2020-09-18 LAB
ALBUMIN SERPL BCP-MCNC: 3.9 G/DL (ref 3.5–5.2)
ALP SERPL-CCNC: 95 U/L (ref 55–135)
ALT SERPL W/O P-5'-P-CCNC: 17 U/L (ref 10–44)
ANION GAP SERPL CALC-SCNC: 10 MMOL/L (ref 8–16)
AST SERPL-CCNC: 16 U/L (ref 10–40)
BASOPHILS # BLD AUTO: 0.01 K/UL (ref 0–0.2)
BASOPHILS NFR BLD: 0.2 % (ref 0–1.9)
BILIRUB SERPL-MCNC: 0.5 MG/DL (ref 0.1–1)
BUN SERPL-MCNC: 13 MG/DL (ref 8–23)
CALCIUM SERPL-MCNC: 10.3 MG/DL (ref 8.7–10.5)
CHLORIDE SERPL-SCNC: 97 MMOL/L (ref 95–110)
CHOLEST SERPL-MCNC: 187 MG/DL (ref 120–199)
CHOLEST/HDLC SERPL: 3.1 {RATIO} (ref 2–5)
CO2 SERPL-SCNC: 26 MMOL/L (ref 23–29)
CREAT SERPL-MCNC: 1.2 MG/DL (ref 0.5–1.4)
DIFFERENTIAL METHOD: ABNORMAL
EOSINOPHIL # BLD AUTO: 0.1 K/UL (ref 0–0.5)
EOSINOPHIL NFR BLD: 1.4 % (ref 0–8)
ERYTHROCYTE [DISTWIDTH] IN BLOOD BY AUTOMATED COUNT: 12.2 % (ref 11.5–14.5)
EST. GFR  (AFRICAN AMERICAN): 54.4 ML/MIN/1.73 M^2
EST. GFR  (NON AFRICAN AMERICAN): 47.2 ML/MIN/1.73 M^2
GLUCOSE SERPL-MCNC: 454 MG/DL (ref 70–110)
HCT VFR BLD AUTO: 46 % (ref 37–48.5)
HDLC SERPL-MCNC: 61 MG/DL (ref 40–75)
HDLC SERPL: 32.6 % (ref 20–50)
HGB BLD-MCNC: 14.6 G/DL (ref 12–16)
IMM GRANULOCYTES # BLD AUTO: 0.01 K/UL (ref 0–0.04)
IMM GRANULOCYTES NFR BLD AUTO: 0.2 % (ref 0–0.5)
LDLC SERPL CALC-MCNC: 107.4 MG/DL (ref 63–159)
LYMPHOCYTES # BLD AUTO: 1.9 K/UL (ref 1–4.8)
LYMPHOCYTES NFR BLD: 33.8 % (ref 18–48)
MCH RBC QN AUTO: 29.3 PG (ref 27–31)
MCHC RBC AUTO-ENTMCNC: 31.7 G/DL (ref 32–36)
MCV RBC AUTO: 92 FL (ref 82–98)
MONOCYTES # BLD AUTO: 0.3 K/UL (ref 0.3–1)
MONOCYTES NFR BLD: 5.5 % (ref 4–15)
NEUTROPHILS # BLD AUTO: 3.3 K/UL (ref 1.8–7.7)
NEUTROPHILS NFR BLD: 58.9 % (ref 38–73)
NONHDLC SERPL-MCNC: 126 MG/DL
NRBC BLD-RTO: 0 /100 WBC
PLATELET # BLD AUTO: 361 K/UL (ref 150–350)
PMV BLD AUTO: 12.2 FL (ref 9.2–12.9)
POTASSIUM SERPL-SCNC: 4.3 MMOL/L (ref 3.5–5.1)
PROT SERPL-MCNC: 8 G/DL (ref 6–8.4)
RBC # BLD AUTO: 4.99 M/UL (ref 4–5.4)
SODIUM SERPL-SCNC: 133 MMOL/L (ref 136–145)
TRIGL SERPL-MCNC: 93 MG/DL (ref 30–150)
TSH SERPL DL<=0.005 MIU/L-ACNC: 0.27 UIU/ML (ref 0.4–4)
WBC # BLD AUTO: 5.62 K/UL (ref 3.9–12.7)

## 2020-09-18 PROCEDURE — 36415 COLL VENOUS BLD VENIPUNCTURE: CPT | Mod: HCNC,PO

## 2020-09-18 PROCEDURE — 99499 RISK ADDL DX/OHS AUDIT: ICD-10-PCS | Mod: HCNC,S$GLB,, | Performed by: FAMILY MEDICINE

## 2020-09-18 PROCEDURE — 99499 UNLISTED E&M SERVICE: CPT | Mod: HCNC,S$GLB,, | Performed by: FAMILY MEDICINE

## 2020-09-18 PROCEDURE — 80053 COMPREHEN METABOLIC PANEL: CPT | Mod: HCNC

## 2020-09-18 PROCEDURE — 3046F HEMOGLOBIN A1C LEVEL >9.0%: CPT | Mod: HCNC,CPTII,S$GLB, | Performed by: FAMILY MEDICINE

## 2020-09-18 PROCEDURE — G0009 ADMIN PNEUMOCOCCAL VACCINE: HCPCS | Mod: HCNC,S$GLB,, | Performed by: FAMILY MEDICINE

## 2020-09-18 PROCEDURE — 85025 COMPLETE CBC W/AUTO DIFF WBC: CPT | Mod: HCNC

## 2020-09-18 PROCEDURE — 3077F SYST BP >= 140 MM HG: CPT | Mod: HCNC,CPTII,S$GLB, | Performed by: FAMILY MEDICINE

## 2020-09-18 PROCEDURE — 90662 FLU VACCINE - QUADRIVALENT - HIGH DOSE (65+) PRESERVATIVE FREE IM: ICD-10-PCS | Mod: HCNC,S$GLB,, | Performed by: FAMILY MEDICINE

## 2020-09-18 PROCEDURE — 90662 IIV NO PRSV INCREASED AG IM: CPT | Mod: HCNC,S$GLB,, | Performed by: FAMILY MEDICINE

## 2020-09-18 PROCEDURE — 90670 PCV13 VACCINE IM: CPT | Mod: HCNC,S$GLB,, | Performed by: FAMILY MEDICINE

## 2020-09-18 PROCEDURE — 84439 ASSAY OF FREE THYROXINE: CPT | Mod: HCNC

## 2020-09-18 PROCEDURE — 99999 PR PBB SHADOW E&M-EST. PATIENT-LVL V: CPT | Mod: PBBFAC,HCNC,, | Performed by: FAMILY MEDICINE

## 2020-09-18 PROCEDURE — 82043 UR ALBUMIN QUANTITATIVE: CPT | Mod: HCNC

## 2020-09-18 PROCEDURE — 3077F PR MOST RECENT SYSTOLIC BLOOD PRESSURE >= 140 MM HG: ICD-10-PCS | Mod: HCNC,CPTII,S$GLB, | Performed by: FAMILY MEDICINE

## 2020-09-18 PROCEDURE — 90670 PNEUMOCOCCAL CONJUGATE VACCINE 13-VALENT LESS THAN 5YO & GREATER THAN: ICD-10-PCS | Mod: HCNC,S$GLB,, | Performed by: FAMILY MEDICINE

## 2020-09-18 PROCEDURE — G0009 PNEUMOCOCCAL CONJUGATE VACCINE 13-VALENT LESS THAN 5YO & GREATER THAN: ICD-10-PCS | Mod: HCNC,S$GLB,, | Performed by: FAMILY MEDICINE

## 2020-09-18 PROCEDURE — 3080F DIAST BP >= 90 MM HG: CPT | Mod: HCNC,CPTII,S$GLB, | Performed by: FAMILY MEDICINE

## 2020-09-18 PROCEDURE — 84443 ASSAY THYROID STIM HORMONE: CPT | Mod: HCNC

## 2020-09-18 PROCEDURE — 83036 HEMOGLOBIN GLYCOSYLATED A1C: CPT | Mod: HCNC

## 2020-09-18 PROCEDURE — 99397 PER PM REEVAL EST PAT 65+ YR: CPT | Mod: 25,HCNC,S$GLB, | Performed by: FAMILY MEDICINE

## 2020-09-18 PROCEDURE — 3080F PR MOST RECENT DIASTOLIC BLOOD PRESSURE >= 90 MM HG: ICD-10-PCS | Mod: HCNC,CPTII,S$GLB, | Performed by: FAMILY MEDICINE

## 2020-09-18 PROCEDURE — 99397 PR PREVENTIVE VISIT,EST,65 & OVER: ICD-10-PCS | Mod: 25,HCNC,S$GLB, | Performed by: FAMILY MEDICINE

## 2020-09-18 PROCEDURE — 3046F PR MOST RECENT HEMOGLOBIN A1C LEVEL > 9.0%: ICD-10-PCS | Mod: HCNC,CPTII,S$GLB, | Performed by: FAMILY MEDICINE

## 2020-09-18 PROCEDURE — 80061 LIPID PANEL: CPT | Mod: HCNC

## 2020-09-18 PROCEDURE — 99999 PR PBB SHADOW E&M-EST. PATIENT-LVL V: ICD-10-PCS | Mod: PBBFAC,HCNC,, | Performed by: FAMILY MEDICINE

## 2020-09-18 PROCEDURE — G0008 FLU VACCINE - QUADRIVALENT - HIGH DOSE (65+) PRESERVATIVE FREE IM: ICD-10-PCS | Mod: HCNC,S$GLB,, | Performed by: FAMILY MEDICINE

## 2020-09-18 PROCEDURE — G0008 ADMIN INFLUENZA VIRUS VAC: HCPCS | Mod: HCNC,S$GLB,, | Performed by: FAMILY MEDICINE

## 2020-09-18 NOTE — PROGRESS NOTES
"HISTORY OF PRESENT ILLNESS: Ms. Danielson comes in today fasting and without taking medication and without acute problems for annual wellness examination.  She states she feels her blood pressure is elevated today as she does not drive and her transportation was changed at the last minute this morning.  She reports home blood pressure checks using wrist cuff range 120's/80's. She states she feels good today.    END OF LIFE DECISION: She has no living will and does not desire life support.    Current Outpatient Medications   Medication Sig    aspirin (ECOTRIN) 81 MG EC tablet Take 81 mg by mouth once daily.    atorvastatin (LIPITOR) 40 MG tablet Take 1 tablet (40 mg total) by mouth nightly.    benazepriL (LOTENSIN) 40 MG tablet Take 1 tablet (40 mg total) by mouth once daily.    blood sugar diagnostic (ONETOUCH ULTRA TEST) Strp 1 strip by Misc.(Non-Drug; Combo Route) route 2 (two) times daily.    cyclobenzaprine (FLEXERIL) 10 MG tablet Take 1 tablet (10 mg total) by mouth nightly.    diazePAM (VALIUM) 2 MG tablet Take 1 tablet (2 mg total) by mouth once as needed for Anxiety (30 minutes prior to procedure).    fluticasone (FLONASE) 50 mcg/actuation nasal spray 2 Spray, Suspension Nasal Every day.  each nostril    glimepiride (AMARYL) 4 MG tablet TAKE 1 TABLET BY MOUTH TWICE DAILY    insulin glargine, TOUJEO, (TOUJEO SOLOSTAR U-300 INSULIN) 300 unit/mL (1.5 mL) InPn pen Inject 32 Units into the skin every evening. (TAKING 26 units daily)    isosorbide-hydrALAZINE 20-37.5 mg (BIDIL) 20-37.5 mg Tab Take 1 tablet by mouth 2 (two) times daily.    meloxicam (MOBIC) 15 MG tablet TAKE 1 TABLET(15 MG) BY MOUTH DAILY WITH FOOD AS NEEDED FOR PAIN    pen needle, diabetic 32 gauge x 5/32" Ndle Use daily with Lantus    sertraline (ZOLOFT) 50 MG tablet Take 1 tablet (50 mg total) by mouth once daily. For anxiety       SCREENINGS:   Cholesterol: September 17, 2019.  FFS/Colonoscopy: March 1, 2017 - diverticulosis; repeat " in 10 years.   Mammogram: September 24, 2019 - benign.   GYN Exam: January 7, 2016 - okay.   Dexa Scan: September 24, 2019 - okay.  Eye Exam: September 24, 2019 with Dr. Reveles. She wears glasses.  Dental Exam: 2016 per patient. Due.  PPD: Negative in the past.  Immunizations: Td/Tdap - September 14, 2006. She desires.  Gardasil - N./A.  Zostavax - January 7, 2016.  Pneumovax - August 14, 2006. Due on/after September 18, 2021.  Prevnar-13 shot - Never. She desires  Shingrix - Never. Advised patient insurance-covered benefit only a local pharmacy.   Seasonal Flu - January 7, 2016. She desires.  Hepatitis B vaccines - in the past per patient.    ROS:  GENERAL: Denies fever, chills, fatigue or unusual weight change. Appetite normal. Weight 70.7 kg (155 lb 13.8 oz) at May 18, 2020 visit. Reports exercises 5 times per week - 1 hour each time. Monitors diet.  SKIN: Denies rashes, itching, changes in mole, color or texture of skin or easy bruising.  HEAD: Denies recent head trauma or recent headaches.  EYES: Denies change in vision, pain, diplopia, redness or discharge. Wears glasses.  EARS: Denies ear pain, discharge, vertigo or decreased hearing.  NOSE: Denies loss of smell, epistaxis or rhinitis.  MOUTH & THROAT: Denies hoarseness or change in voice. Denies excessive gum bleeding or mouth sores. Denies sore throat.  NODES: Denies swollen glands.  CHEST: Denies HOYT, wheezing, cough, or sputum production. Reports does uses CPAP prn. Scheduled to see Dr. Simon, pulmonologist, on February 5, 2015 for NATASHA surveillance. Reports does not desire to follow up at this time.  CARDIOVASCULAR: Denies chest pain, PND, orthopnea or reduced exercise tolerance. Denies palpitations. Saw JESSIE Rubio with Dr. Montez, cardiologist, on September 24, 2019 for CAD, hypertension surveillance. Reports interested in HDMP but not currently active on the portal.  ABDOMEN: Denies diarrhea, nausea, vomiting, or blood in stool except reports  "occasional abdomen pain, constipation and occasionally take Dulcolax with help.   URINARY: Denies flank pain, urine frequency, dysuria or hematuria.  GENITOURINARY: Denies flank pain, dysuria, vaginal discharge or hematuria. Performs monthly breast self-exams.  HEME/LYMPH: Denies bleeding problems.  ENDOCRINE:Reports checks glucose levels twice daily with levels ranging 180's in the mornings and 200's at nights. Reports interested in DDMP but not currently active on portal. Saw ENT Dr. Richy Medina on March 24, 2017 for multinodular goiter.  PERIPHERAL VASCULAR:Denies claudication or cyanosis.  MUSCULOSKELETAL: Denies swelling, pain, stiffness in joints except occasional pain at sides of neck but not today.  Reports some times takes Advil with help.   NEUROLOGIC: Denies history of seizures, tremors, paralysis, alteration of gait or coordination. Reports occasional numbness at right hand.  PSYCHIATRIC: Denies mood swings, depression, anxiety, homicidal or suicidal thoughts. Reports rare sleep problems (insomnia).    PE:   VS: BP (!) 172/90 Comment: Rechecked by Dr. Bowen. Patient w/more medication to take.  Pulse 77   Temp 98.1 °F (36.7 °C) (Temporal)   Resp 16   Ht 5' 5" (1.651 m)   Wt 69.2 kg (152 lb 7.2 oz)   SpO2 97%   BMI 25.37 kg/m²   APPEARANCE: Well nourished, well developed female, pleasant and overweight, alert and oriented in no acute distress.   HEAD: Non tender. Full range of motion.  EYES: PERRL, conjunctiva pink, lids no edema. She wears glasses.  EARS: External canal patent, no swelling or redness. TM's shiny and clear.  NOSE: Mucosa and turbinates pink, not swollen. No discharge. Non tender sinuses.  THROAT: No pharyngeal erythema or exudate. No stridor.   NECK: Supple, no mass except chronic, slight non tender goiter noted.  NODES: No cervical, axillary lymph node enlargement.  CHEST: Normal respiratory effort. Lungs clear to auscultation.  CARDIOVASCULAR: Normal S1, S2. No rubs, murmurs or " gallops. PMI not displaced. No carotid bruit. Pedal pulses palpable bilaterally. No edema. Feet look okay without ulcerations or skin breaks.  ABDOMEN: Bowel sounds present. Not distended. Soft. No tenderness, masses or organomegaly.  BREAST EXAM: Symmetrical, no external lesions, no discharge, no masses palpated.  PELVIC EXAM: Not examined as patient has had IKE/BSO due to noncancerous reasons.  RECTAL EXAM: Not examined per patient request today.   MUSCULOSKELETAL: No joint deformities or stiffness. Non tender neck with full range of motion noted. She is ambulatory without problems.  SKIN: No rashes or suspicious lesions, normal color and turgor except benign-appearing skin tag mid back and keloids at anterior chest and abdomen and umbilicus.  NEUROLOGIC: Cranial Nerves: II-XII grossly intact. DTR's: Knees, Ankles 2+ and equal bilaterally. Monofilament test unremarkable. Gait & Posture: Normal gait and fine motion.  PSYCHIATRIC: Patient alert, oriented x 3. Mood/Affect normal without acute anxiety or depression noted. Judgment/insight good as she makes appropriate decisions on today's examination.    Protective Sensation (w/ 10 gram monofilament):  Right: Intact  Left: Intact    Visual Inspection:  Normal -  Bilateral    Pedal Pulses:   Right: Present  Left: Present    Posterior tibialis:   Right:Present  Left: Present       ASSESSMENT:    ICD-10-CM ICD-9-CM    1. Annual physical exam  Z00.00 V70.0    2. Hypertension associated with diabetes  E11.59 250.80 Comprehensive metabolic panel    I10 401.9 Lipid Panel      CBC auto differential      TSH      Hypertension Digital Medicine ([facility]) Enrollment Order      Hypertension Digital Medicine ([facility]): Assign Onboarding Questionnaires   3. Uncontrolled type 2 diabetes mellitus with hyperglycemia, with long-term current use of insulin  E11.65 250.02 Microalbumin/creatinine urine ratio    Z79.4 V58.67 Comprehensive metabolic panel      Hemoglobin A1C      Diabetes Digital  Medicine ([facility]) Enrollment Order      Diabetes Digital Medicine ([facility]): Assign Onboarding Questionnaires   4. Combined hyperlipidemia associated with type 2 diabetes mellitus  E11.69 250.80 Comprehensive metabolic panel    E78.2 272.2 Lipid Panel   5. Coronary artery disease involving native coronary artery of native heart without angina pectoris  I25.10 414.01    6. Multinodular goiter  E04.2 241.1 US Soft Tissue Head Neck Thyroid   7. NATASHA (obstructive sleep apnea)  G47.33 327.23    8. Cervical radiculopathy  M54.12 723.4    9. Anxiety  F41.9 300.00    10. Overweight (BMI 25.0-29.9)  E66.3 278.02    11. Postmenopausal  Z78.0 V49.81    12. Visit for screening mammogram  Z12.31 V76.12 Mammo Digital Screening Bilat   13. Need for influenza vaccination  Z23 V04.81 Influenza - Quadrivalent - High Dose (65+) (PF) (IM)   14. Need for prophylactic vaccination against Streptococcus pneumoniae (pneumococcus)  Z23 V03.82 Pneumococcal Conjugate Vaccine (13 Valent) (IM)       PLAN:  1. Age-appropriate counseling-appropriate low-sodium, low-cholesterol, low carbohydrate diet and exercise daily, monthly breast self exam, annual wellness examination.   2. Patient advised to call for results.  3. Continue current medications and advised patient to increase Toujeo to 26 units daily.  4. Annual eye and dental exams encouraged.  5. Keep follow up with specialists.  6. Follow up in about 3 months (around 12/18/2020) for  diabetes and hypertension follow up.

## 2020-09-19 LAB
ALBUMIN/CREAT UR: 232.8 UG/MG (ref 0–30)
CREAT UR-MCNC: 61 MG/DL (ref 15–325)
ESTIMATED AVG GLUCOSE: 283 MG/DL (ref 68–131)
HBA1C MFR BLD HPLC: 11.5 % (ref 4–5.6)
MICROALBUMIN UR DL<=1MG/L-MCNC: 142 UG/ML
T4 FREE SERPL-MCNC: 1.09 NG/DL (ref 0.71–1.51)

## 2020-09-29 ENCOUNTER — PATIENT MESSAGE (OUTPATIENT)
Dept: OTHER | Facility: OTHER | Age: 65
End: 2020-09-29

## 2020-10-05 PROBLEM — E11.69 COMBINED HYPERLIPIDEMIA ASSOCIATED WITH TYPE 2 DIABETES MELLITUS: Status: ACTIVE | Noted: 2020-10-05

## 2020-10-05 PROBLEM — E66.3 OVERWEIGHT (BMI 25.0-29.9): Status: ACTIVE | Noted: 2020-10-05

## 2020-10-05 PROBLEM — E78.2 COMBINED HYPERLIPIDEMIA ASSOCIATED WITH TYPE 2 DIABETES MELLITUS: Status: ACTIVE | Noted: 2020-10-05

## 2020-10-05 PROBLEM — F41.9 ANXIETY: Status: ACTIVE | Noted: 2020-10-05

## 2020-10-06 DIAGNOSIS — I15.2 HYPERTENSION ASSOCIATED WITH DIABETES: Chronic | ICD-10-CM

## 2020-10-06 DIAGNOSIS — E11.65 UNCONTROLLED TYPE 2 DIABETES MELLITUS WITH HYPERGLYCEMIA, WITH LONG-TERM CURRENT USE OF INSULIN: ICD-10-CM

## 2020-10-06 DIAGNOSIS — Z79.4 UNCONTROLLED TYPE 2 DIABETES MELLITUS WITH HYPERGLYCEMIA, WITH LONG-TERM CURRENT USE OF INSULIN: ICD-10-CM

## 2020-10-06 DIAGNOSIS — E11.59 HYPERTENSION ASSOCIATED WITH DIABETES: Chronic | ICD-10-CM

## 2020-10-06 RX ORDER — INSULIN GLARGINE 300 U/ML
INJECTION, SOLUTION SUBCUTANEOUS
Qty: 9 ML | Refills: 5 | Status: SHIPPED | OUTPATIENT
Start: 2020-10-06 | End: 2021-03-10 | Stop reason: SDUPTHER

## 2020-10-06 RX ORDER — BENAZEPRIL HYDROCHLORIDE 40 MG/1
40 TABLET ORAL DAILY
Qty: 90 TABLET | Refills: 1 | Status: SHIPPED | OUTPATIENT
Start: 2020-10-06 | End: 2021-01-15

## 2020-10-06 NOTE — TELEPHONE ENCOUNTER
----- Message from Chelsea Bowen MD sent at 10/5/2020  3:13 AM CDT -----  Advise pt her  lab results are within acceptable range except the followin - high diabetes level - Please continue your current medications but increase Toujeo 26 units daily by 3 units each week until you reach morning fasting sugar readings of less than 150 for at least 5 days of the current week; you may go as high as 48 units in 4 weeks.  2 - your thyroid level is borderline abnormal without need for medication BUT let's plan to recheck this level at your follow up visit with me.  3 - you have stable, chronic decreased kidney function; please continue to avoid taking NSAID's (Mobic, Advil, Aleve, Motrin and Ibuprofen) as these may cause further decline in your kidney function.  4 - you have protein in your urine; let's monitor for now and recheck at your follow up visit with me. Thanks.

## 2020-10-06 NOTE — TELEPHONE ENCOUNTER
Pt was given lab result information   Verbalized understanding   Stated she needed refill of medication to increase insulin

## 2020-10-13 ENCOUNTER — HOSPITAL ENCOUNTER (OUTPATIENT)
Dept: RADIOLOGY | Facility: HOSPITAL | Age: 65
Discharge: HOME | End: 2020-10-13
Attending: FAMILY MEDICINE
Payer: MEDICARE

## 2020-10-13 VITALS — BODY MASS INDEX: 25.42 KG/M2 | WEIGHT: 152.56 LBS | HEIGHT: 65 IN

## 2020-10-13 DIAGNOSIS — Z12.31 VISIT FOR SCREENING MAMMOGRAM: ICD-10-CM

## 2020-10-13 PROCEDURE — 77067 SCR MAMMO BI INCL CAD: CPT | Mod: 26,HCNC,, | Performed by: RADIOLOGY

## 2020-10-13 PROCEDURE — 77063 MAMMO DIGITAL SCREENING BILAT WITH TOMO: ICD-10-PCS | Mod: 26,HCNC,, | Performed by: RADIOLOGY

## 2020-10-13 PROCEDURE — 77067 MAMMO DIGITAL SCREENING BILAT WITH TOMO: ICD-10-PCS | Mod: 26,HCNC,, | Performed by: RADIOLOGY

## 2020-10-13 PROCEDURE — 77063 BREAST TOMOSYNTHESIS BI: CPT | Mod: 26,HCNC,, | Performed by: RADIOLOGY

## 2020-10-13 PROCEDURE — 77067 SCR MAMMO BI INCL CAD: CPT | Mod: TC,HCNC

## 2020-10-19 ENCOUNTER — TELEPHONE (OUTPATIENT)
Dept: FAMILY MEDICINE | Facility: CLINIC | Age: 65
End: 2020-10-19

## 2020-10-19 ENCOUNTER — TELEPHONE (OUTPATIENT)
Dept: RADIOLOGY | Facility: HOSPITAL | Age: 65
End: 2020-10-19

## 2020-10-19 NOTE — TELEPHONE ENCOUNTER
----- Message from Tere Prajapati sent at 10/19/2020 10:41 AM CDT -----  Regarding: RET CALL  Contact: PATIENT  Type:  Patient Returning Call    Who Called:PATIENT  Who Left Message for Patient:NURSE  Does the patient know what this is regarding?:?  Would the patient rather a call back or a response via [facility]? CALL  Best Call Back Number:[phone]  Additional Information: PLEASE CALL PATIENT TODAY ASAP

## 2020-10-20 ENCOUNTER — HOSPITAL ENCOUNTER (OUTPATIENT)
Dept: RADIOLOGY | Facility: HOSPITAL | Age: 65
Discharge: HOME | End: 2020-10-20
Attending: FAMILY MEDICINE
Payer: MEDICARE

## 2020-10-20 DIAGNOSIS — E04.2 MULTINODULAR GOITER: ICD-10-CM

## 2020-10-20 PROCEDURE — 76536 US EXAM OF HEAD AND NECK: CPT | Mod: TC,HCNC

## 2020-10-20 PROCEDURE — 76536 US EXAM OF HEAD AND NECK: CPT | Mod: 26,HCNC,, | Performed by: RADIOLOGY

## 2020-10-20 PROCEDURE — 76536 US SOFT TISSUE HEAD NECK THYROID: ICD-10-PCS | Mod: 26,HCNC,, | Performed by: RADIOLOGY

## 2020-11-27 DIAGNOSIS — E11.65 UNCONTROLLED TYPE 2 DIABETES MELLITUS WITH HYPERGLYCEMIA, WITH LONG-TERM CURRENT USE OF INSULIN: ICD-10-CM

## 2020-11-27 DIAGNOSIS — Z79.4 UNCONTROLLED TYPE 2 DIABETES MELLITUS WITH HYPERGLYCEMIA, WITH LONG-TERM CURRENT USE OF INSULIN: ICD-10-CM

## 2020-11-27 RX ORDER — GLIMEPIRIDE 4 MG/1
TABLET ORAL
Qty: 180 TABLET | Refills: 1 | Status: SHIPPED | OUTPATIENT
Start: 2020-11-27 | End: 2021-03-10 | Stop reason: SDUPTHER

## 2020-12-11 ENCOUNTER — PATIENT MESSAGE (OUTPATIENT)
Dept: OTHER | Facility: OTHER | Age: 65
End: 2020-12-11

## 2020-12-16 ENCOUNTER — PES CALL (OUTPATIENT)
Dept: ADMINISTRATIVE | Facility: CLINIC | Age: 65
End: 2020-12-16

## 2020-12-18 ENCOUNTER — HOSPITAL ENCOUNTER (OUTPATIENT)
Dept: RADIOLOGY | Facility: HOSPITAL | Age: 65
Discharge: HOME | End: 2020-12-18
Attending: FAMILY MEDICINE
Payer: MEDICARE

## 2020-12-18 ENCOUNTER — OFFICE VISIT (OUTPATIENT)
Dept: FAMILY MEDICINE | Facility: CLINIC | Age: 65
End: 2020-12-18
Payer: MEDICARE

## 2020-12-18 VITALS
HEART RATE: 80 BPM | SYSTOLIC BLOOD PRESSURE: 168 MMHG | OXYGEN SATURATION: 99 % | BODY MASS INDEX: 26.1 KG/M2 | WEIGHT: 156.63 LBS | TEMPERATURE: 98 F | DIASTOLIC BLOOD PRESSURE: 98 MMHG | HEIGHT: 65 IN

## 2020-12-18 DIAGNOSIS — I25.10 CORONARY ARTERY DISEASE INVOLVING NATIVE CORONARY ARTERY OF NATIVE HEART WITHOUT ANGINA PECTORIS: Chronic | ICD-10-CM

## 2020-12-18 DIAGNOSIS — M79.671 RIGHT FOOT PAIN: ICD-10-CM

## 2020-12-18 DIAGNOSIS — E11.59 HYPERTENSION ASSOCIATED WITH DIABETES: Primary | Chronic | ICD-10-CM

## 2020-12-18 DIAGNOSIS — N18.31 STAGE 3A CHRONIC KIDNEY DISEASE: ICD-10-CM

## 2020-12-18 DIAGNOSIS — E11.69 COMBINED HYPERLIPIDEMIA ASSOCIATED WITH TYPE 2 DIABETES MELLITUS: ICD-10-CM

## 2020-12-18 DIAGNOSIS — E66.3 OVERWEIGHT (BMI 25.0-29.9): ICD-10-CM

## 2020-12-18 DIAGNOSIS — I15.2 HYPERTENSION ASSOCIATED WITH DIABETES: Primary | Chronic | ICD-10-CM

## 2020-12-18 DIAGNOSIS — E78.2 COMBINED HYPERLIPIDEMIA ASSOCIATED WITH TYPE 2 DIABETES MELLITUS: ICD-10-CM

## 2020-12-18 DIAGNOSIS — E11.65 UNCONTROLLED TYPE 2 DIABETES MELLITUS WITH HYPERGLYCEMIA, WITH LONG-TERM CURRENT USE OF INSULIN: ICD-10-CM

## 2020-12-18 DIAGNOSIS — R80.9 PROTEINURIA, UNSPECIFIED TYPE: ICD-10-CM

## 2020-12-18 DIAGNOSIS — Z79.4 UNCONTROLLED TYPE 2 DIABETES MELLITUS WITH HYPERGLYCEMIA, WITH LONG-TERM CURRENT USE OF INSULIN: ICD-10-CM

## 2020-12-18 DIAGNOSIS — E04.2 MULTINODULAR GOITER: Chronic | ICD-10-CM

## 2020-12-18 LAB
ALBUMIN/CREAT UR: 235.7 UG/MG (ref 0–30)
CREAT UR-MCNC: 98 MG/DL (ref 15–325)
MICROALBUMIN UR DL<=1MG/L-MCNC: 231 UG/ML

## 2020-12-18 PROCEDURE — 1101F PR PT FALLS ASSESS DOC 0-1 FALLS W/OUT INJ PAST YR: ICD-10-PCS | Mod: CPTII,S$GLB,, | Performed by: FAMILY MEDICINE

## 2020-12-18 PROCEDURE — 3080F DIAST BP >= 90 MM HG: CPT | Mod: CPTII,S$GLB,, | Performed by: FAMILY MEDICINE

## 2020-12-18 PROCEDURE — 73630 X-RAY EXAM OF FOOT: CPT | Mod: 26,HCNC,RT, | Performed by: RADIOLOGY

## 2020-12-18 PROCEDURE — 1159F PR MEDICATION LIST DOCUMENTED IN MEDICAL RECORD: ICD-10-PCS | Mod: S$GLB,,, | Performed by: FAMILY MEDICINE

## 2020-12-18 PROCEDURE — 82043 UR ALBUMIN QUANTITATIVE: CPT | Mod: HCNC

## 2020-12-18 PROCEDURE — 99499 RISK ADDL DX/OHS AUDIT: ICD-10-PCS | Mod: S$GLB,,, | Performed by: FAMILY MEDICINE

## 2020-12-18 PROCEDURE — 99214 OFFICE O/P EST MOD 30 MIN: CPT | Mod: S$GLB,,, | Performed by: FAMILY MEDICINE

## 2020-12-18 PROCEDURE — 99999 PR PBB SHADOW E&M-EST. PATIENT-LVL V: CPT | Mod: PBBFAC,,, | Performed by: FAMILY MEDICINE

## 2020-12-18 PROCEDURE — 3008F PR BODY MASS INDEX (BMI) DOCUMENTED: ICD-10-PCS | Mod: CPTII,S$GLB,, | Performed by: FAMILY MEDICINE

## 2020-12-18 PROCEDURE — 3008F BODY MASS INDEX DOCD: CPT | Mod: CPTII,S$GLB,, | Performed by: FAMILY MEDICINE

## 2020-12-18 PROCEDURE — 99999 PR PBB SHADOW E&M-EST. PATIENT-LVL V: ICD-10-PCS | Mod: PBBFAC,,, | Performed by: FAMILY MEDICINE

## 2020-12-18 PROCEDURE — 1159F MED LIST DOCD IN RCRD: CPT | Mod: S$GLB,,, | Performed by: FAMILY MEDICINE

## 2020-12-18 PROCEDURE — 1125F AMNT PAIN NOTED PAIN PRSNT: CPT | Mod: S$GLB,,, | Performed by: FAMILY MEDICINE

## 2020-12-18 PROCEDURE — 3288F PR FALLS RISK ASSESSMENT DOCUMENTED: ICD-10-PCS | Mod: CPTII,S$GLB,, | Performed by: FAMILY MEDICINE

## 2020-12-18 PROCEDURE — 3077F SYST BP >= 140 MM HG: CPT | Mod: CPTII,S$GLB,, | Performed by: FAMILY MEDICINE

## 2020-12-18 PROCEDURE — 3046F PR MOST RECENT HEMOGLOBIN A1C LEVEL > 9.0%: ICD-10-PCS | Mod: CPTII,S$GLB,, | Performed by: FAMILY MEDICINE

## 2020-12-18 PROCEDURE — 1101F PT FALLS ASSESS-DOCD LE1/YR: CPT | Mod: CPTII,S$GLB,, | Performed by: FAMILY MEDICINE

## 2020-12-18 PROCEDURE — 1125F PR PAIN SEVERITY QUANTIFIED, PAIN PRESENT: ICD-10-PCS | Mod: S$GLB,,, | Performed by: FAMILY MEDICINE

## 2020-12-18 PROCEDURE — 82570 ASSAY OF URINE CREATININE: CPT | Mod: HCNC

## 2020-12-18 PROCEDURE — 3288F FALL RISK ASSESSMENT DOCD: CPT | Mod: CPTII,S$GLB,, | Performed by: FAMILY MEDICINE

## 2020-12-18 PROCEDURE — 3077F PR MOST RECENT SYSTOLIC BLOOD PRESSURE >= 140 MM HG: ICD-10-PCS | Mod: CPTII,S$GLB,, | Performed by: FAMILY MEDICINE

## 2020-12-18 PROCEDURE — 73630 X-RAY EXAM OF FOOT: CPT | Mod: TC,HCNC,FY,PO,RT

## 2020-12-18 PROCEDURE — 3080F PR MOST RECENT DIASTOLIC BLOOD PRESSURE >= 90 MM HG: ICD-10-PCS | Mod: CPTII,S$GLB,, | Performed by: FAMILY MEDICINE

## 2020-12-18 PROCEDURE — 99499 UNLISTED E&M SERVICE: CPT | Mod: S$GLB,,, | Performed by: FAMILY MEDICINE

## 2020-12-18 PROCEDURE — 3046F HEMOGLOBIN A1C LEVEL >9.0%: CPT | Mod: CPTII,S$GLB,, | Performed by: FAMILY MEDICINE

## 2020-12-18 PROCEDURE — 73630 XR FOOT COMPLETE 3 VIEW RIGHT: ICD-10-PCS | Mod: 26,HCNC,RT, | Performed by: RADIOLOGY

## 2020-12-18 PROCEDURE — 99214 PR OFFICE/OUTPT VISIT, EST, LEVL IV, 30-39 MIN: ICD-10-PCS | Mod: S$GLB,,, | Performed by: FAMILY MEDICINE

## 2020-12-18 RX ORDER — HYDRALAZINE HYDROCHLORIDE AND ISOSORBIDE DINITRATE 37.5; 2 MG/1; MG/1
1 TABLET, FILM COATED ORAL 2 TIMES DAILY
Qty: 180 TABLET | Refills: 1 | Status: SHIPPED | OUTPATIENT
Start: 2020-12-18 | End: 2021-03-10 | Stop reason: SDUPTHER

## 2020-12-21 ENCOUNTER — TELEPHONE (OUTPATIENT)
Dept: FAMILY MEDICINE | Facility: CLINIC | Age: 65
End: 2020-12-21

## 2020-12-21 ENCOUNTER — PATIENT OUTREACH (OUTPATIENT)
Dept: ADMINISTRATIVE | Facility: HOSPITAL | Age: 65
End: 2020-12-21

## 2020-12-21 NOTE — TELEPHONE ENCOUNTER
----- Message from Marlo Slater sent at 12/21/2020  3:14 PM CST -----  Contact: self  Type:  Test Results    Who Called: Lydia Danielson   Name of Test (Lab/Mammo/Etc): Xray  Date of Test: 12/18  Ordering Provider: Andrés  Where the test was performed: [facility]  Would the patient rather a call back or a response via [facility]? Call back  Best Call Back Number: [phone]  Additional Information:

## 2020-12-21 NOTE — PROGRESS NOTES
Working Diabetes Report     Pt Scheduled For Labs 1/4/ 2021     Shanika CAROLINA LPN Care Coordinator  Care Coordination Department  [facility]  [phone]

## 2020-12-22 ENCOUNTER — TELEPHONE (OUTPATIENT)
Dept: FAMILY MEDICINE | Facility: CLINIC | Age: 65
End: 2020-12-22

## 2020-12-22 NOTE — TELEPHONE ENCOUNTER
----- Message from Mary Ellen Hawkins sent at 12/22/2020 11:36 AM CST -----  Contact: Lydia Cannon is requesting a call back in regards to xray results. Please call her back at [phone]       Thanks  DD

## 2020-12-30 ENCOUNTER — TELEPHONE (OUTPATIENT)
Dept: FAMILY MEDICINE | Facility: CLINIC | Age: 65
End: 2020-12-30

## 2020-12-30 DIAGNOSIS — E04.2 MULTINODULAR GOITER: Primary | Chronic | ICD-10-CM

## 2020-12-30 DIAGNOSIS — R80.9 PROTEINURIA, UNSPECIFIED TYPE: ICD-10-CM

## 2020-12-30 NOTE — TELEPHONE ENCOUNTER
----- Message from Hope Mccoy sent at 12/30/2020 11:18 AM CST -----  Regarding: MEDICATION  Pt is requesting call back in regards to questions about getting medication for sinus infection          [facility] call back [phone]

## 2020-12-31 NOTE — TELEPHONE ENCOUNTER
Please advise pt w/her symptoms I recommend she go to [facility] for evaluation and Covid-19 testing.    Also, advise pt the followin - foot x-ray did not show fracture but showed benign calcification at toe.  2 - thyroid u/s shows nodules; needs to see ENT for further evaluation - please assist pt w/appt.  3 - protein remains in urine; needs to see Kidney doctor ([facility]) for further evaluation if not currently followed by kidney doctor - please assist pt w/appt.      Thanks.

## 2020-12-31 NOTE — TELEPHONE ENCOUNTER
----- Message from Maicol Mascorro sent at 12/30/2020  3:09 PM CST -----  Regarding: pt  .Type:  Patient Returning Call    Who Called:pt   Who Left Message for Patient:nurse   Does the patient know what this is regarding?:unsure   Would the patient rather a call back or a response via [facility]? Call back   Best Call Back Number:.[phone] ([place])   Additional Information:     Thank You   Maicol Mascorro

## 2020-12-31 NOTE — TELEPHONE ENCOUNTER
Spoke to patient and she said she had been coughing a lot, having headaches and sneezing and she has tried taking robitussin and benadryl and it is not working and would like for you to send something to [facility]

## 2021-01-03 PROBLEM — N18.31 STAGE 3A CHRONIC KIDNEY DISEASE: Status: ACTIVE | Noted: 2021-01-03

## 2021-01-05 ENCOUNTER — CLINICAL SUPPORT (OUTPATIENT)
Dept: URGENT CARE | Facility: CLINIC | Age: 66
End: 2021-01-05
Payer: MEDICARE

## 2021-01-05 ENCOUNTER — TELEPHONE (OUTPATIENT)
Dept: FAMILY MEDICINE | Facility: CLINIC | Age: 66
End: 2021-01-05

## 2021-01-05 DIAGNOSIS — Z11.9 ENCOUNTER FOR SCREENING EXAMINATION FOR INFECTIOUS DISEASE: Primary | ICD-10-CM

## 2021-01-05 LAB
CTP QC/QA: YES
SARS-COV-2 RDRP RESP QL NAA+PROBE: NEGATIVE

## 2021-01-05 PROCEDURE — 99211 OFF/OP EST MAY X REQ PHY/QHP: CPT | Mod: S$GLB,,, | Performed by: PHYSICIAN ASSISTANT

## 2021-01-05 PROCEDURE — U0002 COVID-19 LAB TEST NON-CDC: HCPCS | Mod: QW,S$GLB,, | Performed by: PHYSICIAN ASSISTANT

## 2021-01-05 PROCEDURE — 99211 PR OFFICE/OUTPT VISIT, EST, LEVL I: ICD-10-PCS | Mod: S$GLB,,, | Performed by: PHYSICIAN ASSISTANT

## 2021-01-05 PROCEDURE — U0002: ICD-10-PCS | Mod: QW,S$GLB,, | Performed by: PHYSICIAN ASSISTANT

## 2021-01-12 ENCOUNTER — PATIENT OUTREACH (OUTPATIENT)
Dept: ADMINISTRATIVE | Facility: OTHER | Age: 66
End: 2021-01-12

## 2021-01-25 ENCOUNTER — TELEPHONE (OUTPATIENT)
Dept: FAMILY MEDICINE | Facility: CLINIC | Age: 66
End: 2021-01-25

## 2021-02-10 ENCOUNTER — OFFICE VISIT (OUTPATIENT)
Dept: OTOLARYNGOLOGY | Facility: CLINIC | Age: 66
End: 2021-02-10
Payer: MEDICARE

## 2021-02-10 ENCOUNTER — OFFICE VISIT (OUTPATIENT)
Dept: OPHTHALMOLOGY | Facility: CLINIC | Age: 66
End: 2021-02-10
Payer: MEDICARE

## 2021-02-10 VITALS
WEIGHT: 154.56 LBS | SYSTOLIC BLOOD PRESSURE: 195 MMHG | DIASTOLIC BLOOD PRESSURE: 98 MMHG | TEMPERATURE: 98 F | HEART RATE: 64 BPM | BODY MASS INDEX: 25.72 KG/M2

## 2021-02-10 DIAGNOSIS — H52.4 HYPEROPIA WITH ASTIGMATISM AND PRESBYOPIA, BILATERAL: ICD-10-CM

## 2021-02-10 DIAGNOSIS — H52.03 HYPEROPIA WITH ASTIGMATISM AND PRESBYOPIA, BILATERAL: ICD-10-CM

## 2021-02-10 DIAGNOSIS — E11.65 UNCONTROLLED TYPE 2 DIABETES MELLITUS WITH HYPERGLYCEMIA, WITH LONG-TERM CURRENT USE OF INSULIN: ICD-10-CM

## 2021-02-10 DIAGNOSIS — Z13.5 GLAUCOMA SCREENING: ICD-10-CM

## 2021-02-10 DIAGNOSIS — Z79.4 UNCONTROLLED TYPE 2 DIABETES MELLITUS WITH HYPERGLYCEMIA, WITH LONG-TERM CURRENT USE OF INSULIN: ICD-10-CM

## 2021-02-10 DIAGNOSIS — E04.2 MULTIPLE THYROID NODULES: Primary | ICD-10-CM

## 2021-02-10 DIAGNOSIS — H52.203 HYPEROPIA WITH ASTIGMATISM AND PRESBYOPIA, BILATERAL: ICD-10-CM

## 2021-02-10 DIAGNOSIS — H40.013 OPEN ANGLE WITH BORDERLINE FINDINGS OF BOTH EYES: ICD-10-CM

## 2021-02-10 DIAGNOSIS — H25.013 CORTICAL AGE-RELATED CATARACT OF BOTH EYES: ICD-10-CM

## 2021-02-10 DIAGNOSIS — E11.3292 MILD NONPROLIFERATIVE DIABETIC RETINOPATHY OF LEFT EYE WITHOUT MACULAR EDEMA ASSOCIATED WITH TYPE 2 DIABETES MELLITUS: Primary | ICD-10-CM

## 2021-02-10 DIAGNOSIS — E04.2 MULTINODULAR GOITER: Chronic | ICD-10-CM

## 2021-02-10 DIAGNOSIS — H25.13 NUCLEAR SCLEROSIS, BILATERAL: ICD-10-CM

## 2021-02-10 PROCEDURE — 3008F BODY MASS INDEX DOCD: CPT | Mod: CPTII,S$GLB,, | Performed by: PHYSICIAN ASSISTANT

## 2021-02-10 PROCEDURE — 1126F PR PAIN SEVERITY QUANTIFIED, NO PAIN PRESENT: ICD-10-PCS | Mod: S$GLB,,, | Performed by: PHYSICIAN ASSISTANT

## 2021-02-10 PROCEDURE — 3008F PR BODY MASS INDEX (BMI) DOCUMENTED: ICD-10-PCS | Mod: CPTII,S$GLB,, | Performed by: PHYSICIAN ASSISTANT

## 2021-02-10 PROCEDURE — 92133 POSTERIOR SEGMENT OCT OPTIC NERVE(OCULAR COHERENCE TOMOGRAPHY) - OU - BOTH EYES: ICD-10-PCS | Mod: S$GLB,,, | Performed by: OPTOMETRIST

## 2021-02-10 PROCEDURE — 1126F AMNT PAIN NOTED NONE PRSNT: CPT | Mod: S$GLB,,, | Performed by: PHYSICIAN ASSISTANT

## 2021-02-10 PROCEDURE — 99499 UNLISTED E&M SERVICE: CPT | Mod: S$GLB,,, | Performed by: PHYSICIAN ASSISTANT

## 2021-02-10 PROCEDURE — 99999 PR PBB SHADOW E&M-EST. PATIENT-LVL III: CPT | Mod: PBBFAC,,, | Performed by: PHYSICIAN ASSISTANT

## 2021-02-10 PROCEDURE — 92133 CPTRZD OPH DX IMG PST SGM ON: CPT | Mod: S$GLB,,, | Performed by: OPTOMETRIST

## 2021-02-10 PROCEDURE — 92014 PR EYE EXAM, EST PATIENT,COMPREHESV: ICD-10-PCS | Mod: S$GLB,,, | Performed by: OPTOMETRIST

## 2021-02-10 PROCEDURE — 99499 RISK ADDL DX/OHS AUDIT: ICD-10-PCS | Mod: S$GLB,,, | Performed by: PHYSICIAN ASSISTANT

## 2021-02-10 PROCEDURE — 99999 PR PBB SHADOW E&M-EST. PATIENT-LVL III: ICD-10-PCS | Mod: PBBFAC,,, | Performed by: PHYSICIAN ASSISTANT

## 2021-02-10 PROCEDURE — 3077F PR MOST RECENT SYSTOLIC BLOOD PRESSURE >= 140 MM HG: ICD-10-PCS | Mod: CPTII,S$GLB,, | Performed by: PHYSICIAN ASSISTANT

## 2021-02-10 PROCEDURE — 99999 PR PBB SHADOW E&M-EST. PATIENT-LVL III: ICD-10-PCS | Mod: PBBFAC,,, | Performed by: OPTOMETRIST

## 2021-02-10 PROCEDURE — 1159F MED LIST DOCD IN RCRD: CPT | Mod: S$GLB,,, | Performed by: PHYSICIAN ASSISTANT

## 2021-02-10 PROCEDURE — 3080F DIAST BP >= 90 MM HG: CPT | Mod: CPTII,S$GLB,, | Performed by: PHYSICIAN ASSISTANT

## 2021-02-10 PROCEDURE — 3077F SYST BP >= 140 MM HG: CPT | Mod: CPTII,S$GLB,, | Performed by: PHYSICIAN ASSISTANT

## 2021-02-10 PROCEDURE — 99204 PR OFFICE/OUTPT VISIT, NEW, LEVL IV, 45-59 MIN: ICD-10-PCS | Mod: S$GLB,,, | Performed by: PHYSICIAN ASSISTANT

## 2021-02-10 PROCEDURE — 1159F PR MEDICATION LIST DOCUMENTED IN MEDICAL RECORD: ICD-10-PCS | Mod: S$GLB,,, | Performed by: PHYSICIAN ASSISTANT

## 2021-02-10 PROCEDURE — 92014 COMPRE OPH EXAM EST PT 1/>: CPT | Mod: S$GLB,,, | Performed by: OPTOMETRIST

## 2021-02-10 PROCEDURE — 99999 PR PBB SHADOW E&M-EST. PATIENT-LVL III: CPT | Mod: PBBFAC,,, | Performed by: OPTOMETRIST

## 2021-02-10 PROCEDURE — 99204 OFFICE O/P NEW MOD 45 MIN: CPT | Mod: S$GLB,,, | Performed by: PHYSICIAN ASSISTANT

## 2021-02-10 PROCEDURE — 3080F PR MOST RECENT DIASTOLIC BLOOD PRESSURE >= 90 MM HG: ICD-10-PCS | Mod: CPTII,S$GLB,, | Performed by: PHYSICIAN ASSISTANT

## 2021-02-11 ENCOUNTER — PES CALL (OUTPATIENT)
Dept: ADMINISTRATIVE | Facility: CLINIC | Age: 66
End: 2021-02-11

## 2021-02-23 ENCOUNTER — HOSPITAL ENCOUNTER (OUTPATIENT)
Dept: RADIOLOGY | Facility: HOSPITAL | Age: 66
Discharge: HOME | End: 2021-02-23
Attending: PHYSICIAN ASSISTANT
Payer: MEDICARE

## 2021-02-23 DIAGNOSIS — E04.2 MULTINODULAR GOITER: ICD-10-CM

## 2021-02-23 DIAGNOSIS — E04.2 MULTIPLE THYROID NODULES: ICD-10-CM

## 2021-02-23 DIAGNOSIS — E04.1 THYROID NODULE: ICD-10-CM

## 2021-02-23 PROCEDURE — 88173 CYTOPATH EVAL FNA REPORT: CPT | Mod: 59 | Performed by: PATHOLOGY

## 2021-02-23 PROCEDURE — 10005 FNA BX W/US GDN 1ST LES: CPT

## 2021-02-23 PROCEDURE — 88173 CYTOPATH EVAL FNA REPORT: CPT | Mod: 26,,, | Performed by: PATHOLOGY

## 2021-02-23 PROCEDURE — 10006 FNA BX W/US GDN EA ADDL: CPT

## 2021-02-23 PROCEDURE — 88173 PR  INTERPRETATION OF FNA SMEAR: ICD-10-PCS | Mod: 26,,, | Performed by: PATHOLOGY

## 2021-02-24 ENCOUNTER — PES CALL (OUTPATIENT)
Dept: ADMINISTRATIVE | Facility: CLINIC | Age: 66
End: 2021-02-24

## 2021-02-25 LAB
COMMENT: NORMAL
FINAL PATHOLOGIC DIAGNOSIS: NORMAL

## 2021-02-26 ENCOUNTER — TELEPHONE (OUTPATIENT)
Dept: OTOLARYNGOLOGY | Facility: CLINIC | Age: 66
End: 2021-02-26

## 2021-02-26 DIAGNOSIS — E04.2 MULTINODULAR GOITER: Primary | ICD-10-CM

## 2021-03-10 DIAGNOSIS — I15.2 HYPERTENSION ASSOCIATED WITH DIABETES: Chronic | ICD-10-CM

## 2021-03-10 DIAGNOSIS — Z79.4 UNCONTROLLED TYPE 2 DIABETES MELLITUS WITH HYPERGLYCEMIA, WITH LONG-TERM CURRENT USE OF INSULIN: ICD-10-CM

## 2021-03-10 DIAGNOSIS — E11.65 UNCONTROLLED TYPE 2 DIABETES MELLITUS WITH HYPERGLYCEMIA, WITH LONG-TERM CURRENT USE OF INSULIN: ICD-10-CM

## 2021-03-10 DIAGNOSIS — E11.59 HYPERTENSION ASSOCIATED WITH DIABETES: Chronic | ICD-10-CM

## 2021-03-10 RX ORDER — HYDRALAZINE HYDROCHLORIDE AND ISOSORBIDE DINITRATE 37.5; 2 MG/1; MG/1
1 TABLET, FILM COATED ORAL 2 TIMES DAILY
Qty: 180 TABLET | Refills: 1 | Status: SHIPPED | OUTPATIENT
Start: 2021-03-10 | End: 2021-03-31 | Stop reason: ALTCHOICE

## 2021-03-10 RX ORDER — INSULIN GLARGINE 300 U/ML
INJECTION, SOLUTION SUBCUTANEOUS
Qty: 9 ML | Refills: 5 | Status: SHIPPED | OUTPATIENT
Start: 2021-03-10 | End: 2021-10-07

## 2021-03-10 RX ORDER — BENAZEPRIL HYDROCHLORIDE 40 MG/1
40 TABLET ORAL DAILY
Qty: 90 TABLET | Refills: 1 | Status: SHIPPED | OUTPATIENT
Start: 2021-03-10 | End: 2021-11-11

## 2021-03-10 RX ORDER — GLIMEPIRIDE 4 MG/1
4 TABLET ORAL 2 TIMES DAILY
Qty: 180 TABLET | Refills: 1 | Status: SHIPPED | OUTPATIENT
Start: 2021-03-10 | End: 2021-11-11

## 2021-03-15 DIAGNOSIS — Z79.4 UNCONTROLLED TYPE 2 DIABETES MELLITUS WITH HYPERGLYCEMIA, WITH LONG-TERM CURRENT USE OF INSULIN: ICD-10-CM

## 2021-03-15 DIAGNOSIS — E11.65 UNCONTROLLED TYPE 2 DIABETES MELLITUS WITH HYPERGLYCEMIA, WITH LONG-TERM CURRENT USE OF INSULIN: ICD-10-CM

## 2021-03-31 ENCOUNTER — OFFICE VISIT (OUTPATIENT)
Dept: FAMILY MEDICINE | Facility: CLINIC | Age: 66
End: 2021-03-31
Payer: MEDICARE

## 2021-03-31 ENCOUNTER — LAB VISIT (OUTPATIENT)
Dept: LAB | Facility: HOSPITAL | Age: 66
End: 2021-03-31
Attending: FAMILY MEDICINE
Payer: MEDICARE

## 2021-03-31 ENCOUNTER — TELEPHONE (OUTPATIENT)
Dept: CARDIOLOGY | Facility: CLINIC | Age: 66
End: 2021-03-31

## 2021-03-31 VITALS
HEIGHT: 65 IN | WEIGHT: 157.19 LBS | BODY MASS INDEX: 26.19 KG/M2 | TEMPERATURE: 97 F | DIASTOLIC BLOOD PRESSURE: 102 MMHG | OXYGEN SATURATION: 98 % | SYSTOLIC BLOOD PRESSURE: 160 MMHG | HEART RATE: 54 BPM

## 2021-03-31 VITALS — BODY MASS INDEX: 26.19 KG/M2 | WEIGHT: 157.19 LBS | HEIGHT: 65 IN

## 2021-03-31 DIAGNOSIS — E11.65 UNCONTROLLED TYPE 2 DIABETES MELLITUS WITH HYPERGLYCEMIA, WITH LONG-TERM CURRENT USE OF INSULIN: ICD-10-CM

## 2021-03-31 DIAGNOSIS — Z79.4 UNCONTROLLED TYPE 2 DIABETES MELLITUS WITH HYPERGLYCEMIA, WITH LONG-TERM CURRENT USE OF INSULIN: ICD-10-CM

## 2021-03-31 DIAGNOSIS — E66.3 OVERWEIGHT (BMI 25.0-29.9): ICD-10-CM

## 2021-03-31 DIAGNOSIS — N18.31 STAGE 3A CHRONIC KIDNEY DISEASE: ICD-10-CM

## 2021-03-31 DIAGNOSIS — I15.2 HYPERTENSION ASSOCIATED WITH DIABETES: Primary | ICD-10-CM

## 2021-03-31 DIAGNOSIS — E11.59 HYPERTENSION ASSOCIATED WITH DIABETES: ICD-10-CM

## 2021-03-31 DIAGNOSIS — I25.10 CORONARY ARTERY DISEASE INVOLVING NATIVE CORONARY ARTERY OF NATIVE HEART WITHOUT ANGINA PECTORIS: Chronic | ICD-10-CM

## 2021-03-31 DIAGNOSIS — M54.12 CERVICAL RADICULOPATHY: ICD-10-CM

## 2021-03-31 DIAGNOSIS — E11.59 HYPERTENSION ASSOCIATED WITH DIABETES: Primary | ICD-10-CM

## 2021-03-31 DIAGNOSIS — E11.69 COMBINED HYPERLIPIDEMIA ASSOCIATED WITH TYPE 2 DIABETES MELLITUS: ICD-10-CM

## 2021-03-31 DIAGNOSIS — F41.9 ANXIETY: ICD-10-CM

## 2021-03-31 DIAGNOSIS — E78.2 COMBINED HYPERLIPIDEMIA ASSOCIATED WITH TYPE 2 DIABETES MELLITUS: ICD-10-CM

## 2021-03-31 DIAGNOSIS — R20.0 NUMBNESS OF RIGHT HAND: ICD-10-CM

## 2021-03-31 DIAGNOSIS — I15.2 HYPERTENSION ASSOCIATED WITH DIABETES: ICD-10-CM

## 2021-03-31 DIAGNOSIS — I15.2 HYPERTENSION ASSOCIATED WITH DIABETES: Chronic | ICD-10-CM

## 2021-03-31 DIAGNOSIS — G47.33 OSA (OBSTRUCTIVE SLEEP APNEA): ICD-10-CM

## 2021-03-31 DIAGNOSIS — E04.2 MULTINODULAR GOITER: Chronic | ICD-10-CM

## 2021-03-31 DIAGNOSIS — I25.10 CORONARY ARTERY DISEASE INVOLVING NATIVE CORONARY ARTERY OF NATIVE HEART WITHOUT ANGINA PECTORIS: ICD-10-CM

## 2021-03-31 DIAGNOSIS — E11.59 HYPERTENSION ASSOCIATED WITH DIABETES: Chronic | ICD-10-CM

## 2021-03-31 DIAGNOSIS — Z00.00 ENCOUNTER FOR PREVENTIVE HEALTH EXAMINATION: Primary | ICD-10-CM

## 2021-03-31 LAB
ALBUMIN SERPL BCP-MCNC: 3.6 G/DL (ref 3.5–5.2)
ALP SERPL-CCNC: 97 U/L (ref 55–135)
ALT SERPL W/O P-5'-P-CCNC: 22 U/L (ref 10–44)
ANION GAP SERPL CALC-SCNC: 9 MMOL/L (ref 8–16)
AST SERPL-CCNC: 23 U/L (ref 10–40)
BILIRUB SERPL-MCNC: 0.6 MG/DL (ref 0.1–1)
BUN SERPL-MCNC: 14 MG/DL (ref 8–23)
CALCIUM SERPL-MCNC: 10.2 MG/DL (ref 8.7–10.5)
CHLORIDE SERPL-SCNC: 104 MMOL/L (ref 95–110)
CO2 SERPL-SCNC: 29 MMOL/L (ref 23–29)
CREAT SERPL-MCNC: 1.1 MG/DL (ref 0.5–1.4)
EST. GFR  (AFRICAN AMERICAN): >60 ML/MIN/1.73 M^2
EST. GFR  (NON AFRICAN AMERICAN): 52.4 ML/MIN/1.73 M^2
ESTIMATED AVG GLUCOSE: 318 MG/DL (ref 68–131)
GLUCOSE SERPL-MCNC: 109 MG/DL (ref 70–110)
HBA1C MFR BLD: 12.7 % (ref 4–5.6)
POTASSIUM SERPL-SCNC: 4.2 MMOL/L (ref 3.5–5.1)
PROT SERPL-MCNC: 7.8 G/DL (ref 6–8.4)
SODIUM SERPL-SCNC: 142 MMOL/L (ref 136–145)
T4 FREE SERPL-MCNC: 1.06 NG/DL (ref 0.71–1.51)
TSH SERPL DL<=0.005 MIU/L-ACNC: 0.4 UIU/ML (ref 0.4–4)

## 2021-03-31 PROCEDURE — 1125F AMNT PAIN NOTED PAIN PRSNT: CPT | Mod: S$GLB,,, | Performed by: FAMILY MEDICINE

## 2021-03-31 PROCEDURE — 3008F PR BODY MASS INDEX (BMI) DOCUMENTED: ICD-10-PCS | Mod: CPTII,S$GLB,, | Performed by: NURSE PRACTITIONER

## 2021-03-31 PROCEDURE — 3288F FALL RISK ASSESSMENT DOCD: CPT | Mod: CPTII,S$GLB,, | Performed by: FAMILY MEDICINE

## 2021-03-31 PROCEDURE — 3077F SYST BP >= 140 MM HG: CPT | Mod: CPTII,S$GLB,, | Performed by: FAMILY MEDICINE

## 2021-03-31 PROCEDURE — 3080F PR MOST RECENT DIASTOLIC BLOOD PRESSURE >= 90 MM HG: ICD-10-PCS | Mod: CPTII,S$GLB,, | Performed by: NURSE PRACTITIONER

## 2021-03-31 PROCEDURE — 3008F BODY MASS INDEX DOCD: CPT | Mod: CPTII,S$GLB,, | Performed by: FAMILY MEDICINE

## 2021-03-31 PROCEDURE — 1126F AMNT PAIN NOTED NONE PRSNT: CPT | Mod: S$GLB,,, | Performed by: NURSE PRACTITIONER

## 2021-03-31 PROCEDURE — 3080F DIAST BP >= 90 MM HG: CPT | Mod: CPTII,S$GLB,, | Performed by: NURSE PRACTITIONER

## 2021-03-31 PROCEDURE — 3008F BODY MASS INDEX DOCD: CPT | Mod: CPTII,S$GLB,, | Performed by: NURSE PRACTITIONER

## 2021-03-31 PROCEDURE — 1125F PR PAIN SEVERITY QUANTIFIED, PAIN PRESENT: ICD-10-PCS | Mod: S$GLB,,, | Performed by: FAMILY MEDICINE

## 2021-03-31 PROCEDURE — 3077F PR MOST RECENT SYSTOLIC BLOOD PRESSURE >= 140 MM HG: ICD-10-PCS | Mod: CPTII,S$GLB,, | Performed by: NURSE PRACTITIONER

## 2021-03-31 PROCEDURE — 99999 PR PBB SHADOW E&M-EST. PATIENT-LVL IV: CPT | Mod: PBBFAC,,, | Performed by: FAMILY MEDICINE

## 2021-03-31 PROCEDURE — 3008F PR BODY MASS INDEX (BMI) DOCUMENTED: ICD-10-PCS | Mod: CPTII,S$GLB,, | Performed by: FAMILY MEDICINE

## 2021-03-31 PROCEDURE — 83036 HEMOGLOBIN GLYCOSYLATED A1C: CPT | Performed by: FAMILY MEDICINE

## 2021-03-31 PROCEDURE — 99499 RISK ADDL DX/OHS AUDIT: ICD-10-PCS | Mod: S$GLB,,, | Performed by: FAMILY MEDICINE

## 2021-03-31 PROCEDURE — 1158F ADVNC CARE PLAN TLK DOCD: CPT | Mod: S$GLB,,, | Performed by: NURSE PRACTITIONER

## 2021-03-31 PROCEDURE — 99999 PR PBB SHADOW E&M-EST. PATIENT-LVL III: CPT | Mod: PBBFAC,,, | Performed by: NURSE PRACTITIONER

## 2021-03-31 PROCEDURE — 84443 ASSAY THYROID STIM HORMONE: CPT | Performed by: FAMILY MEDICINE

## 2021-03-31 PROCEDURE — 1158F ADVNC CARE PLAN TLK DOCD: CPT | Mod: S$GLB,,, | Performed by: FAMILY MEDICINE

## 2021-03-31 PROCEDURE — 1101F PT FALLS ASSESS-DOCD LE1/YR: CPT | Mod: CPTII,S$GLB,, | Performed by: FAMILY MEDICINE

## 2021-03-31 PROCEDURE — 3046F HEMOGLOBIN A1C LEVEL >9.0%: CPT | Mod: CPTII,S$GLB,, | Performed by: NURSE PRACTITIONER

## 2021-03-31 PROCEDURE — 3288F PR FALLS RISK ASSESSMENT DOCUMENTED: ICD-10-PCS | Mod: CPTII,S$GLB,, | Performed by: NURSE PRACTITIONER

## 2021-03-31 PROCEDURE — G0439 PPPS, SUBSEQ VISIT: HCPCS | Mod: S$GLB,,, | Performed by: NURSE PRACTITIONER

## 2021-03-31 PROCEDURE — 1158F PR ADVANCE CARE PLANNING DISCUSS DOCUMENTED IN MEDICAL RECORD: ICD-10-PCS | Mod: S$GLB,,, | Performed by: NURSE PRACTITIONER

## 2021-03-31 PROCEDURE — 36415 COLL VENOUS BLD VENIPUNCTURE: CPT | Mod: PO | Performed by: FAMILY MEDICINE

## 2021-03-31 PROCEDURE — 1101F PR PT FALLS ASSESS DOC 0-1 FALLS W/OUT INJ PAST YR: ICD-10-PCS | Mod: CPTII,S$GLB,, | Performed by: NURSE PRACTITIONER

## 2021-03-31 PROCEDURE — 3046F HEMOGLOBIN A1C LEVEL >9.0%: CPT | Mod: CPTII,S$GLB,, | Performed by: FAMILY MEDICINE

## 2021-03-31 PROCEDURE — 80053 COMPREHEN METABOLIC PANEL: CPT | Performed by: FAMILY MEDICINE

## 2021-03-31 PROCEDURE — 1126F PR PAIN SEVERITY QUANTIFIED, NO PAIN PRESENT: ICD-10-PCS | Mod: S$GLB,,, | Performed by: NURSE PRACTITIONER

## 2021-03-31 PROCEDURE — 1158F PR ADVANCE CARE PLANNING DISCUSS DOCUMENTED IN MEDICAL RECORD: ICD-10-PCS | Mod: S$GLB,,, | Performed by: FAMILY MEDICINE

## 2021-03-31 PROCEDURE — G0439 PR MEDICARE ANNUAL WELLNESS SUBSEQUENT VISIT: ICD-10-PCS | Mod: S$GLB,,, | Performed by: NURSE PRACTITIONER

## 2021-03-31 PROCEDURE — 99215 PR OFFICE/OUTPT VISIT, EST, LEVL V, 40-54 MIN: ICD-10-PCS | Mod: S$GLB,,, | Performed by: FAMILY MEDICINE

## 2021-03-31 PROCEDURE — 1159F MED LIST DOCD IN RCRD: CPT | Mod: S$GLB,,, | Performed by: FAMILY MEDICINE

## 2021-03-31 PROCEDURE — 3288F PR FALLS RISK ASSESSMENT DOCUMENTED: ICD-10-PCS | Mod: CPTII,S$GLB,, | Performed by: FAMILY MEDICINE

## 2021-03-31 PROCEDURE — 99215 OFFICE O/P EST HI 40 MIN: CPT | Mod: S$GLB,,, | Performed by: FAMILY MEDICINE

## 2021-03-31 PROCEDURE — 3080F PR MOST RECENT DIASTOLIC BLOOD PRESSURE >= 90 MM HG: ICD-10-PCS | Mod: CPTII,S$GLB,, | Performed by: FAMILY MEDICINE

## 2021-03-31 PROCEDURE — 3046F PR MOST RECENT HEMOGLOBIN A1C LEVEL > 9.0%: ICD-10-PCS | Mod: CPTII,S$GLB,, | Performed by: FAMILY MEDICINE

## 2021-03-31 PROCEDURE — 84439 ASSAY OF FREE THYROXINE: CPT | Performed by: FAMILY MEDICINE

## 2021-03-31 PROCEDURE — 3288F FALL RISK ASSESSMENT DOCD: CPT | Mod: CPTII,S$GLB,, | Performed by: NURSE PRACTITIONER

## 2021-03-31 PROCEDURE — 99999 PR PBB SHADOW E&M-EST. PATIENT-LVL IV: ICD-10-PCS | Mod: PBBFAC,,, | Performed by: FAMILY MEDICINE

## 2021-03-31 PROCEDURE — 3080F DIAST BP >= 90 MM HG: CPT | Mod: CPTII,S$GLB,, | Performed by: FAMILY MEDICINE

## 2021-03-31 PROCEDURE — 3077F PR MOST RECENT SYSTOLIC BLOOD PRESSURE >= 140 MM HG: ICD-10-PCS | Mod: CPTII,S$GLB,, | Performed by: FAMILY MEDICINE

## 2021-03-31 PROCEDURE — 99499 UNLISTED E&M SERVICE: CPT | Mod: S$GLB,,, | Performed by: FAMILY MEDICINE

## 2021-03-31 PROCEDURE — 1101F PT FALLS ASSESS-DOCD LE1/YR: CPT | Mod: CPTII,S$GLB,, | Performed by: NURSE PRACTITIONER

## 2021-03-31 PROCEDURE — 1159F PR MEDICATION LIST DOCUMENTED IN MEDICAL RECORD: ICD-10-PCS | Mod: S$GLB,,, | Performed by: FAMILY MEDICINE

## 2021-03-31 PROCEDURE — 3077F SYST BP >= 140 MM HG: CPT | Mod: CPTII,S$GLB,, | Performed by: NURSE PRACTITIONER

## 2021-03-31 PROCEDURE — 1101F PR PT FALLS ASSESS DOC 0-1 FALLS W/OUT INJ PAST YR: ICD-10-PCS | Mod: CPTII,S$GLB,, | Performed by: FAMILY MEDICINE

## 2021-03-31 PROCEDURE — 3046F PR MOST RECENT HEMOGLOBIN A1C LEVEL > 9.0%: ICD-10-PCS | Mod: CPTII,S$GLB,, | Performed by: NURSE PRACTITIONER

## 2021-03-31 PROCEDURE — 99999 PR PBB SHADOW E&M-EST. PATIENT-LVL III: ICD-10-PCS | Mod: PBBFAC,,, | Performed by: NURSE PRACTITIONER

## 2021-03-31 RX ORDER — LANCETS
EACH MISCELLANEOUS
Qty: 100 EACH | Refills: 11 | Status: SHIPPED | OUTPATIENT
Start: 2021-03-31

## 2021-03-31 RX ORDER — INSULIN PUMP SYRINGE, 3 ML
EACH MISCELLANEOUS
Qty: 1 EACH | Refills: 0 | Status: SHIPPED | OUTPATIENT
Start: 2021-03-31 | End: 2022-03-15

## 2021-03-31 RX ORDER — HYDRALAZINE HYDROCHLORIDE 50 MG/1
50 TABLET, FILM COATED ORAL 2 TIMES DAILY
Qty: 60 TABLET | Refills: 5 | Status: SHIPPED | OUTPATIENT
Start: 2021-03-31 | End: 2021-11-11

## 2021-03-31 RX ORDER — ISOSORBIDE DINITRATE 20 MG/1
20 TABLET ORAL 2 TIMES DAILY
Qty: 60 TABLET | Refills: 5 | Status: SHIPPED | OUTPATIENT
Start: 2021-03-31 | End: 2021-11-11

## 2021-04-06 DIAGNOSIS — Z76.89 ESTABLISHING CARE WITH NEW DOCTOR, ENCOUNTER FOR: Primary | ICD-10-CM

## 2021-04-13 ENCOUNTER — TELEPHONE (OUTPATIENT)
Dept: FAMILY MEDICINE | Facility: CLINIC | Age: 66
End: 2021-04-13

## 2021-04-13 DIAGNOSIS — Z79.4 UNCONTROLLED TYPE 2 DIABETES MELLITUS WITH HYPERGLYCEMIA, WITH LONG-TERM CURRENT USE OF INSULIN: ICD-10-CM

## 2021-04-13 DIAGNOSIS — E11.65 UNCONTROLLED TYPE 2 DIABETES MELLITUS WITH HYPERGLYCEMIA, WITH LONG-TERM CURRENT USE OF INSULIN: ICD-10-CM

## 2021-04-14 RX ORDER — INSULIN GLARGINE 300 U/ML
INJECTION, SOLUTION SUBCUTANEOUS
Qty: 9 ML | Refills: 5 | Status: CANCELLED | OUTPATIENT
Start: 2021-04-14

## 2021-04-20 ENCOUNTER — PATIENT OUTREACH (OUTPATIENT)
Dept: ADMINISTRATIVE | Facility: HOSPITAL | Age: 66
End: 2021-04-20

## 2021-04-27 ENCOUNTER — PATIENT OUTREACH (OUTPATIENT)
Dept: ADMINISTRATIVE | Facility: HOSPITAL | Age: 66
End: 2021-04-27

## 2021-04-28 ENCOUNTER — OFFICE VISIT (OUTPATIENT)
Dept: CARDIOLOGY | Facility: CLINIC | Age: 66
End: 2021-04-28
Payer: MEDICARE

## 2021-04-28 ENCOUNTER — HOSPITAL ENCOUNTER (OUTPATIENT)
Dept: CARDIOLOGY | Facility: HOSPITAL | Age: 66
Discharge: HOME | End: 2021-04-28
Attending: INTERNAL MEDICINE
Payer: MEDICARE

## 2021-04-28 ENCOUNTER — PATIENT MESSAGE (OUTPATIENT)
Dept: RESEARCH | Facility: HOSPITAL | Age: 66
End: 2021-04-28

## 2021-04-28 VITALS
RESPIRATION RATE: 16 BRPM | WEIGHT: 158 LBS | DIASTOLIC BLOOD PRESSURE: 102 MMHG | BODY MASS INDEX: 26.33 KG/M2 | SYSTOLIC BLOOD PRESSURE: 176 MMHG | OXYGEN SATURATION: 99 % | HEIGHT: 65 IN | HEART RATE: 61 BPM

## 2021-04-28 DIAGNOSIS — E11.69 COMBINED HYPERLIPIDEMIA ASSOCIATED WITH TYPE 2 DIABETES MELLITUS: ICD-10-CM

## 2021-04-28 DIAGNOSIS — I15.2 HYPERTENSION ASSOCIATED WITH DIABETES: Primary | ICD-10-CM

## 2021-04-28 DIAGNOSIS — E11.59 HYPERTENSION ASSOCIATED WITH DIABETES: Primary | ICD-10-CM

## 2021-04-28 DIAGNOSIS — R94.31 ABNORMAL EKG: ICD-10-CM

## 2021-04-28 DIAGNOSIS — G47.33 OSA (OBSTRUCTIVE SLEEP APNEA): ICD-10-CM

## 2021-04-28 DIAGNOSIS — N18.31 STAGE 3A CHRONIC KIDNEY DISEASE: ICD-10-CM

## 2021-04-28 DIAGNOSIS — I44.7 LBBB (LEFT BUNDLE BRANCH BLOCK): ICD-10-CM

## 2021-04-28 DIAGNOSIS — E66.3 OVERWEIGHT (BMI 25.0-29.9): ICD-10-CM

## 2021-04-28 DIAGNOSIS — I25.10 CORONARY ARTERY DISEASE INVOLVING NATIVE CORONARY ARTERY OF NATIVE HEART WITHOUT ANGINA PECTORIS: ICD-10-CM

## 2021-04-28 DIAGNOSIS — F41.9 ANXIETY: ICD-10-CM

## 2021-04-28 DIAGNOSIS — Z76.89 ESTABLISHING CARE WITH NEW DOCTOR, ENCOUNTER FOR: ICD-10-CM

## 2021-04-28 DIAGNOSIS — E78.2 COMBINED HYPERLIPIDEMIA ASSOCIATED WITH TYPE 2 DIABETES MELLITUS: ICD-10-CM

## 2021-04-28 PROCEDURE — 93010 EKG 12-LEAD: ICD-10-PCS | Mod: ,,, | Performed by: INTERNAL MEDICINE

## 2021-04-28 PROCEDURE — 99999 PR PBB SHADOW E&M-EST. PATIENT-LVL IV: CPT | Mod: PBBFAC,,, | Performed by: INTERNAL MEDICINE

## 2021-04-28 PROCEDURE — 99204 PR OFFICE/OUTPT VISIT, NEW, LEVL IV, 45-59 MIN: ICD-10-PCS | Mod: S$GLB,,, | Performed by: INTERNAL MEDICINE

## 2021-04-28 PROCEDURE — 3046F HEMOGLOBIN A1C LEVEL >9.0%: CPT | Mod: CPTII,S$GLB,, | Performed by: INTERNAL MEDICINE

## 2021-04-28 PROCEDURE — 3077F PR MOST RECENT SYSTOLIC BLOOD PRESSURE >= 140 MM HG: ICD-10-PCS | Mod: CPTII,S$GLB,, | Performed by: INTERNAL MEDICINE

## 2021-04-28 PROCEDURE — 1159F MED LIST DOCD IN RCRD: CPT | Mod: S$GLB,,, | Performed by: INTERNAL MEDICINE

## 2021-04-28 PROCEDURE — 93005 ELECTROCARDIOGRAM TRACING: CPT

## 2021-04-28 PROCEDURE — 99204 OFFICE O/P NEW MOD 45 MIN: CPT | Mod: S$GLB,,, | Performed by: INTERNAL MEDICINE

## 2021-04-28 PROCEDURE — 1159F PR MEDICATION LIST DOCUMENTED IN MEDICAL RECORD: ICD-10-PCS | Mod: S$GLB,,, | Performed by: INTERNAL MEDICINE

## 2021-04-28 PROCEDURE — 93010 ELECTROCARDIOGRAM REPORT: CPT | Mod: ,,, | Performed by: INTERNAL MEDICINE

## 2021-04-28 PROCEDURE — 3080F PR MOST RECENT DIASTOLIC BLOOD PRESSURE >= 90 MM HG: ICD-10-PCS | Mod: CPTII,S$GLB,, | Performed by: INTERNAL MEDICINE

## 2021-04-28 PROCEDURE — 3008F PR BODY MASS INDEX (BMI) DOCUMENTED: ICD-10-PCS | Mod: CPTII,S$GLB,, | Performed by: INTERNAL MEDICINE

## 2021-04-28 PROCEDURE — 3046F PR MOST RECENT HEMOGLOBIN A1C LEVEL > 9.0%: ICD-10-PCS | Mod: CPTII,S$GLB,, | Performed by: INTERNAL MEDICINE

## 2021-04-28 PROCEDURE — 3008F BODY MASS INDEX DOCD: CPT | Mod: CPTII,S$GLB,, | Performed by: INTERNAL MEDICINE

## 2021-04-28 PROCEDURE — 3080F DIAST BP >= 90 MM HG: CPT | Mod: CPTII,S$GLB,, | Performed by: INTERNAL MEDICINE

## 2021-04-28 PROCEDURE — 99999 PR PBB SHADOW E&M-EST. PATIENT-LVL IV: ICD-10-PCS | Mod: PBBFAC,,, | Performed by: INTERNAL MEDICINE

## 2021-04-28 PROCEDURE — 3077F SYST BP >= 140 MM HG: CPT | Mod: CPTII,S$GLB,, | Performed by: INTERNAL MEDICINE

## 2021-05-04 ENCOUNTER — TELEPHONE (OUTPATIENT)
Dept: PHYSICAL MEDICINE AND REHAB | Facility: CLINIC | Age: 66
End: 2021-05-04

## 2021-05-21 ENCOUNTER — PATIENT OUTREACH (OUTPATIENT)
Dept: ADMINISTRATIVE | Facility: HOSPITAL | Age: 66
End: 2021-05-21

## 2021-06-10 ENCOUNTER — OFFICE VISIT (OUTPATIENT)
Dept: OPHTHALMOLOGY | Facility: CLINIC | Age: 66
End: 2021-06-10
Payer: MEDICARE

## 2021-06-10 DIAGNOSIS — H40.013 OPEN ANGLE WITH BORDERLINE FINDINGS OF BOTH EYES: Primary | ICD-10-CM

## 2021-06-10 PROCEDURE — 76514 ECHO EXAM OF EYE THICKNESS: CPT | Mod: S$GLB,,, | Performed by: OPTOMETRIST

## 2021-06-10 PROCEDURE — 92083 HUMPHREY VISUAL FIELD - OU - BOTH EYES: ICD-10-PCS | Mod: S$GLB,,, | Performed by: OPTOMETRIST

## 2021-06-10 PROCEDURE — 92012 INTRM OPH EXAM EST PATIENT: CPT | Mod: S$GLB,,, | Performed by: OPTOMETRIST

## 2021-06-10 PROCEDURE — 76514 PR  US, EYE, FOR CORNEAL THICKNESS: ICD-10-PCS | Mod: S$GLB,,, | Performed by: OPTOMETRIST

## 2021-06-10 PROCEDURE — 99999 PR PBB SHADOW E&M-EST. PATIENT-LVL I: CPT | Mod: PBBFAC,,, | Performed by: OPTOMETRIST

## 2021-06-10 PROCEDURE — 92012 PR EYE EXAM, EST PATIENT,INTERMED: ICD-10-PCS | Mod: S$GLB,,, | Performed by: OPTOMETRIST

## 2021-06-10 PROCEDURE — 99999 PR PBB SHADOW E&M-EST. PATIENT-LVL I: ICD-10-PCS | Mod: PBBFAC,,, | Performed by: OPTOMETRIST

## 2021-06-10 PROCEDURE — 92083 EXTENDED VISUAL FIELD XM: CPT | Mod: S$GLB,,, | Performed by: OPTOMETRIST

## 2021-06-11 ENCOUNTER — OFFICE VISIT (OUTPATIENT)
Dept: PHYSICAL MEDICINE AND REHAB | Facility: CLINIC | Age: 66
End: 2021-06-11
Payer: MEDICARE

## 2021-06-11 ENCOUNTER — HOSPITAL ENCOUNTER (OUTPATIENT)
Dept: CARDIOLOGY | Facility: HOSPITAL | Age: 66
Discharge: HOME | End: 2021-06-11
Attending: INTERNAL MEDICINE
Payer: MEDICARE

## 2021-06-11 VITALS
RESPIRATION RATE: 14 BRPM | BODY MASS INDEX: 26.33 KG/M2 | HEART RATE: 53 BPM | SYSTOLIC BLOOD PRESSURE: 239 MMHG | DIASTOLIC BLOOD PRESSURE: 104 MMHG | WEIGHT: 158 LBS | HEIGHT: 65 IN

## 2021-06-11 VITALS
SYSTOLIC BLOOD PRESSURE: 176 MMHG | DIASTOLIC BLOOD PRESSURE: 102 MMHG | BODY MASS INDEX: 26.33 KG/M2 | WEIGHT: 158 LBS | HEIGHT: 65 IN

## 2021-06-11 DIAGNOSIS — G47.33 OSA (OBSTRUCTIVE SLEEP APNEA): ICD-10-CM

## 2021-06-11 DIAGNOSIS — G56.03 BILATERAL CARPAL TUNNEL SYNDROME: Primary | ICD-10-CM

## 2021-06-11 DIAGNOSIS — I44.7 LBBB (LEFT BUNDLE BRANCH BLOCK): ICD-10-CM

## 2021-06-11 DIAGNOSIS — M54.12 CERVICAL RADICULOPATHY: ICD-10-CM

## 2021-06-11 DIAGNOSIS — G56.23 CUBITAL TUNNEL SYNDROME, BILATERAL: ICD-10-CM

## 2021-06-11 DIAGNOSIS — E11.59 HYPERTENSION ASSOCIATED WITH DIABETES: ICD-10-CM

## 2021-06-11 DIAGNOSIS — I15.2 HYPERTENSION ASSOCIATED WITH DIABETES: ICD-10-CM

## 2021-06-11 DIAGNOSIS — R94.31 ABNORMAL EKG: ICD-10-CM

## 2021-06-11 PROCEDURE — 1125F PR PAIN SEVERITY QUANTIFIED, PAIN PRESENT: ICD-10-PCS | Mod: S$GLB,,, | Performed by: PHYSICAL MEDICINE & REHABILITATION

## 2021-06-11 PROCEDURE — 93306 TTE W/DOPPLER COMPLETE: CPT

## 2021-06-11 PROCEDURE — 1159F PR MEDICATION LIST DOCUMENTED IN MEDICAL RECORD: ICD-10-PCS | Mod: S$GLB,,, | Performed by: PHYSICAL MEDICINE & REHABILITATION

## 2021-06-11 PROCEDURE — 93306 ECHO (CUPID ONLY): ICD-10-PCS | Mod: 26,,, | Performed by: INTERNAL MEDICINE

## 2021-06-11 PROCEDURE — 93306 TTE W/DOPPLER COMPLETE: CPT | Mod: 26,,, | Performed by: INTERNAL MEDICINE

## 2021-06-11 PROCEDURE — 3008F PR BODY MASS INDEX (BMI) DOCUMENTED: ICD-10-PCS | Mod: CPTII,S$GLB,, | Performed by: PHYSICAL MEDICINE & REHABILITATION

## 2021-06-11 PROCEDURE — 3008F BODY MASS INDEX DOCD: CPT | Mod: CPTII,S$GLB,, | Performed by: PHYSICAL MEDICINE & REHABILITATION

## 2021-06-11 PROCEDURE — 1125F AMNT PAIN NOTED PAIN PRSNT: CPT | Mod: S$GLB,,, | Performed by: PHYSICAL MEDICINE & REHABILITATION

## 2021-06-11 PROCEDURE — 99204 PR OFFICE/OUTPT VISIT, NEW, LEVL IV, 45-59 MIN: ICD-10-PCS | Mod: 25,S$GLB,, | Performed by: PHYSICAL MEDICINE & REHABILITATION

## 2021-06-11 PROCEDURE — 95886 PR EMG COMPLETE, W/ NERVE CONDUCTION STUDIES, 5+ MUSCLES: ICD-10-PCS | Mod: S$GLB,,, | Performed by: PHYSICAL MEDICINE & REHABILITATION

## 2021-06-11 PROCEDURE — 95886 MUSC TEST DONE W/N TEST COMP: CPT | Mod: S$GLB,,, | Performed by: PHYSICAL MEDICINE & REHABILITATION

## 2021-06-11 PROCEDURE — 99999 PR PBB SHADOW E&M-EST. PATIENT-LVL IV: CPT | Mod: PBBFAC,,, | Performed by: PHYSICAL MEDICINE & REHABILITATION

## 2021-06-11 PROCEDURE — 1159F MED LIST DOCD IN RCRD: CPT | Mod: S$GLB,,, | Performed by: PHYSICAL MEDICINE & REHABILITATION

## 2021-06-11 PROCEDURE — 99999 PR PBB SHADOW E&M-EST. PATIENT-LVL IV: ICD-10-PCS | Mod: PBBFAC,,, | Performed by: PHYSICAL MEDICINE & REHABILITATION

## 2021-06-11 PROCEDURE — 95912 NRV CNDJ TEST 11-12 STUDIES: CPT | Mod: S$GLB,,, | Performed by: PHYSICAL MEDICINE & REHABILITATION

## 2021-06-11 PROCEDURE — 95912 PR NERVE CONDUCTION STUDY; 11 -12 STUDIES: ICD-10-PCS | Mod: S$GLB,,, | Performed by: PHYSICAL MEDICINE & REHABILITATION

## 2021-06-11 PROCEDURE — 99204 OFFICE O/P NEW MOD 45 MIN: CPT | Mod: 25,S$GLB,, | Performed by: PHYSICAL MEDICINE & REHABILITATION

## 2021-06-14 LAB
ASCENDING AORTA: 2.97 CM
AV INDEX (PROSTH): 0.72
AV MEAN GRADIENT: 5 MMHG
AV PEAK GRADIENT: 10 MMHG
AV VALVE AREA: 2 CM2
AV VELOCITY RATIO: 0.65
BSA FOR ECHO PROCEDURE: 1.81 M2
CV ECHO LV RWT: 0.6 CM
DOP CALC AO PEAK VEL: 1.55 M/S
DOP CALC AO VTI: 40.83 CM
DOP CALC LVOT AREA: 2.8 CM2
DOP CALC LVOT DIAMETER: 1.88 CM
DOP CALC LVOT PEAK VEL: 1.01 M/S
DOP CALC LVOT STROKE VOLUME: 81.65 CM3
DOP CALC RVOT PEAK VEL: 0.67 M/S
DOP CALC RVOT VTI: 18.73 CM
DOP CALCLVOT PEAK VEL VTI: 29.43 CM
E WAVE DECELERATION TIME: 326.02 MSEC
E/A RATIO: 0.87
E/E' RATIO: 21.11 M/S
ECHO LV POSTERIOR WALL: 1.27 CM (ref 0.6–1.1)
EJECTION FRACTION: 60 %
FRACTIONAL SHORTENING: 36 % (ref 28–44)
INTERVENTRICULAR SEPTUM: 1.39 CM (ref 0.6–1.1)
IVRT: 134.16 MSEC
LA MAJOR: 5.19 CM
LA MINOR: 5.21 CM
LA WIDTH: 3.83 CM
LEFT ATRIUM SIZE: 3.68 CM
LEFT ATRIUM VOLUME INDEX: 34.8 ML/M2
LEFT ATRIUM VOLUME: 62.3 CM3
LEFT INTERNAL DIMENSION IN SYSTOLE: 2.71 CM (ref 2.1–4)
LEFT VENTRICLE DIASTOLIC VOLUME INDEX: 45.2 ML/M2
LEFT VENTRICLE DIASTOLIC VOLUME: 80.9 ML
LEFT VENTRICLE MASS INDEX: 118 G/M2
LEFT VENTRICLE SYSTOLIC VOLUME INDEX: 15.2 ML/M2
LEFT VENTRICLE SYSTOLIC VOLUME: 27.27 ML
LEFT VENTRICULAR INTERNAL DIMENSION IN DIASTOLE: 4.25 CM (ref 3.5–6)
LEFT VENTRICULAR MASS: 211.24 G
LV LATERAL E/E' RATIO: 19 M/S
LV SEPTAL E/E' RATIO: 23.75 M/S
MV A" WAVE DURATION": 13.99 MSEC
MV PEAK A VEL: 1.09 M/S
MV PEAK E VEL: 0.95 M/S
MV STENOSIS PRESSURE HALF TIME: 94.55 MS
MV VALVE AREA P 1/2 METHOD: 2.33 CM2
PISA TR MAX VEL: 1.97 M/S
PULM VEIN S/D RATIO: 1.51
PV MEAN GRADIENT: 1 MMHG
PV PEAK D VEL: 0.37 M/S
PV PEAK S VEL: 0.56 M/S
PV PEAK VELOCITY: 1.06 CM/S
RA MAJOR: 4.17 CM
RA PRESSURE: 3 MMHG
RA WIDTH: 3.92 CM
RIGHT VENTRICULAR END-DIASTOLIC DIMENSION: 3.09 CM
SINUS: 2.89 CM
STJ: 2.26 CM
TDI LATERAL: 0.05 M/S
TDI SEPTAL: 0.04 M/S
TDI: 0.05 M/S
TR MAX PG: 16 MMHG
TRICUSPID ANNULAR PLANE SYSTOLIC EXCURSION: 2.08 CM
TV REST PULMONARY ARTERY PRESSURE: 19 MMHG

## 2021-07-15 ENCOUNTER — TELEPHONE (OUTPATIENT)
Dept: PULMONOLOGY | Facility: CLINIC | Age: 66
End: 2021-07-15

## 2021-07-28 ENCOUNTER — PATIENT OUTREACH (OUTPATIENT)
Dept: ADMINISTRATIVE | Facility: HOSPITAL | Age: 66
End: 2021-07-28

## 2021-08-04 ENCOUNTER — PATIENT MESSAGE (OUTPATIENT)
Dept: ADMINISTRATIVE | Facility: HOSPITAL | Age: 66
End: 2021-08-04

## 2021-10-06 DIAGNOSIS — E11.65 UNCONTROLLED TYPE 2 DIABETES MELLITUS WITH HYPERGLYCEMIA, WITH LONG-TERM CURRENT USE OF INSULIN: ICD-10-CM

## 2021-10-06 DIAGNOSIS — Z79.4 UNCONTROLLED TYPE 2 DIABETES MELLITUS WITH HYPERGLYCEMIA, WITH LONG-TERM CURRENT USE OF INSULIN: ICD-10-CM

## 2021-10-07 RX ORDER — INSULIN GLARGINE 300 U/ML
INJECTION, SOLUTION SUBCUTANEOUS
Qty: 9 PEN | Refills: 5 | Status: SHIPPED | OUTPATIENT
Start: 2021-10-07 | End: 2021-10-21 | Stop reason: SDUPTHER

## 2021-10-18 ENCOUNTER — PATIENT MESSAGE (OUTPATIENT)
Dept: ADMINISTRATIVE | Facility: HOSPITAL | Age: 66
End: 2021-10-18
Payer: MEDICARE

## 2021-10-21 ENCOUNTER — TELEPHONE (OUTPATIENT)
Dept: ORTHOPEDICS | Facility: CLINIC | Age: 66
End: 2021-10-21

## 2021-10-21 ENCOUNTER — LAB VISIT (OUTPATIENT)
Dept: LAB | Facility: HOSPITAL | Age: 66
End: 2021-10-21
Attending: FAMILY MEDICINE
Payer: MEDICARE

## 2021-10-21 ENCOUNTER — OFFICE VISIT (OUTPATIENT)
Dept: FAMILY MEDICINE | Facility: CLINIC | Age: 66
End: 2021-10-21
Payer: MEDICARE

## 2021-10-21 VITALS
TEMPERATURE: 98 F | OXYGEN SATURATION: 95 % | HEART RATE: 72 BPM | WEIGHT: 157.63 LBS | HEIGHT: 65 IN | SYSTOLIC BLOOD PRESSURE: 158 MMHG | DIASTOLIC BLOOD PRESSURE: 96 MMHG | BODY MASS INDEX: 26.26 KG/M2

## 2021-10-21 DIAGNOSIS — Z78.0 POSTMENOPAUSAL: ICD-10-CM

## 2021-10-21 DIAGNOSIS — Z00.00 ANNUAL PHYSICAL EXAM: Primary | ICD-10-CM

## 2021-10-21 DIAGNOSIS — Z79.4 UNCONTROLLED TYPE 2 DIABETES MELLITUS WITH HYPERGLYCEMIA, WITH LONG-TERM CURRENT USE OF INSULIN: ICD-10-CM

## 2021-10-21 DIAGNOSIS — Z12.31 VISIT FOR SCREENING MAMMOGRAM: ICD-10-CM

## 2021-10-21 DIAGNOSIS — Z23 NEED FOR PROPHYLACTIC VACCINATION AGAINST STREPTOCOCCUS PNEUMONIAE (PNEUMOCOCCUS): ICD-10-CM

## 2021-10-21 DIAGNOSIS — I25.10 CORONARY ARTERY DISEASE INVOLVING NATIVE CORONARY ARTERY OF NATIVE HEART WITHOUT ANGINA PECTORIS: ICD-10-CM

## 2021-10-21 DIAGNOSIS — E78.2 COMBINED HYPERLIPIDEMIA ASSOCIATED WITH TYPE 2 DIABETES MELLITUS: ICD-10-CM

## 2021-10-21 DIAGNOSIS — N18.31 STAGE 3A CHRONIC KIDNEY DISEASE: ICD-10-CM

## 2021-10-21 DIAGNOSIS — G47.33 OSA (OBSTRUCTIVE SLEEP APNEA): ICD-10-CM

## 2021-10-21 DIAGNOSIS — E11.65 UNCONTROLLED TYPE 2 DIABETES MELLITUS WITH HYPERGLYCEMIA, WITH LONG-TERM CURRENT USE OF INSULIN: ICD-10-CM

## 2021-10-21 DIAGNOSIS — E11.59 HYPERTENSION ASSOCIATED WITH DIABETES: ICD-10-CM

## 2021-10-21 DIAGNOSIS — E11.69 COMBINED HYPERLIPIDEMIA ASSOCIATED WITH TYPE 2 DIABETES MELLITUS: ICD-10-CM

## 2021-10-21 DIAGNOSIS — G56.03 BILATERAL CARPAL TUNNEL SYNDROME: ICD-10-CM

## 2021-10-21 DIAGNOSIS — I15.2 HYPERTENSION ASSOCIATED WITH DIABETES: ICD-10-CM

## 2021-10-21 DIAGNOSIS — Z23 NEED FOR INFLUENZA VACCINATION: ICD-10-CM

## 2021-10-21 DIAGNOSIS — E66.3 OVERWEIGHT (BMI 25.0-29.9): ICD-10-CM

## 2021-10-21 LAB
ALBUMIN/CREAT UR: 366.7 UG/MG (ref 0–30)
CREAT UR-MCNC: 90 MG/DL (ref 15–325)
MICROALBUMIN UR DL<=1MG/L-MCNC: 330 UG/ML

## 2021-10-21 PROCEDURE — 4010F ACE/ARB THERAPY RXD/TAKEN: CPT | Mod: HCNC,CPTII,S$GLB, | Performed by: FAMILY MEDICINE

## 2021-10-21 PROCEDURE — 99397 PR PREVENTIVE VISIT,EST,65 & OVER: ICD-10-PCS | Mod: 25,HCNC,S$GLB, | Performed by: FAMILY MEDICINE

## 2021-10-21 PROCEDURE — 99499 UNLISTED E&M SERVICE: CPT | Mod: S$GLB,,, | Performed by: FAMILY MEDICINE

## 2021-10-21 PROCEDURE — 90694 FLU VACCINE - QUADRIVALENT - ADJUVANTED: ICD-10-PCS | Mod: HCNC,S$GLB,, | Performed by: FAMILY MEDICINE

## 2021-10-21 PROCEDURE — G0009 PNEUMOCOCCAL POLYSACCHARIDE VACCINE 23-VALENT =>2YO SQ IM: ICD-10-PCS | Mod: HCNC,S$GLB,, | Performed by: FAMILY MEDICINE

## 2021-10-21 PROCEDURE — 3066F NEPHROPATHY DOC TX: CPT | Mod: HCNC,CPTII,S$GLB, | Performed by: FAMILY MEDICINE

## 2021-10-21 PROCEDURE — 99499 RISK ADDL DX/OHS AUDIT: ICD-10-PCS | Mod: S$GLB,,, | Performed by: FAMILY MEDICINE

## 2021-10-21 PROCEDURE — 90694 VACC AIIV4 NO PRSRV 0.5ML IM: CPT | Mod: HCNC,S$GLB,, | Performed by: FAMILY MEDICINE

## 2021-10-21 PROCEDURE — 3062F PR POS MACROALBUMINURIA RESULT DOCUMENTED/REVIEW: ICD-10-PCS | Mod: HCNC,CPTII,S$GLB, | Performed by: FAMILY MEDICINE

## 2021-10-21 PROCEDURE — 90732 PPSV23 VACC 2 YRS+ SUBQ/IM: CPT | Mod: HCNC,S$GLB,, | Performed by: FAMILY MEDICINE

## 2021-10-21 PROCEDURE — 99397 PER PM REEVAL EST PAT 65+ YR: CPT | Mod: 25,HCNC,S$GLB, | Performed by: FAMILY MEDICINE

## 2021-10-21 PROCEDURE — G0009 ADMIN PNEUMOCOCCAL VACCINE: HCPCS | Mod: HCNC,S$GLB,, | Performed by: FAMILY MEDICINE

## 2021-10-21 PROCEDURE — G0008 ADMIN INFLUENZA VIRUS VAC: HCPCS | Mod: HCNC,S$GLB,, | Performed by: FAMILY MEDICINE

## 2021-10-21 PROCEDURE — 99999 PR PBB SHADOW E&M-EST. PATIENT-LVL V: CPT | Mod: PBBFAC,HCNC,, | Performed by: FAMILY MEDICINE

## 2021-10-21 PROCEDURE — G0008 FLU VACCINE - QUADRIVALENT - ADJUVANTED: ICD-10-PCS | Mod: HCNC,S$GLB,, | Performed by: FAMILY MEDICINE

## 2021-10-21 PROCEDURE — 99999 PR PBB SHADOW E&M-EST. PATIENT-LVL V: ICD-10-PCS | Mod: PBBFAC,HCNC,, | Performed by: FAMILY MEDICINE

## 2021-10-21 PROCEDURE — 4010F PR ACE/ARB THEARPY RXD/TAKEN: ICD-10-PCS | Mod: HCNC,CPTII,S$GLB, | Performed by: FAMILY MEDICINE

## 2021-10-21 PROCEDURE — 90732 PNEUMOCOCCAL POLYSACCHARIDE VACCINE 23-VALENT =>2YO SQ IM: ICD-10-PCS | Mod: HCNC,S$GLB,, | Performed by: FAMILY MEDICINE

## 2021-10-21 PROCEDURE — 3062F POS MACROALBUMINURIA REV: CPT | Mod: HCNC,CPTII,S$GLB, | Performed by: FAMILY MEDICINE

## 2021-10-21 PROCEDURE — 82570 ASSAY OF URINE CREATININE: CPT | Mod: HCNC | Performed by: FAMILY MEDICINE

## 2021-10-21 PROCEDURE — 3066F PR DOCUMENTATION OF TREATMENT FOR NEPHROPATHY: ICD-10-PCS | Mod: HCNC,CPTII,S$GLB, | Performed by: FAMILY MEDICINE

## 2021-10-21 RX ORDER — INSULIN GLARGINE 300 U/ML
32 INJECTION, SOLUTION SUBCUTANEOUS DAILY
Qty: 9 PEN | Refills: 5 | Status: SHIPPED | OUTPATIENT
Start: 2021-10-21

## 2021-10-22 ENCOUNTER — TELEPHONE (OUTPATIENT)
Dept: SPORTS MEDICINE | Facility: CLINIC | Age: 66
End: 2021-10-22

## 2021-10-25 ENCOUNTER — OFFICE VISIT (OUTPATIENT)
Dept: PULMONOLOGY | Facility: CLINIC | Age: 66
End: 2021-10-25
Payer: MEDICARE

## 2021-10-25 ENCOUNTER — TELEPHONE (OUTPATIENT)
Dept: PULMONOLOGY | Facility: HOSPITAL | Age: 66
End: 2021-10-25
Payer: MEDICARE

## 2021-10-25 ENCOUNTER — PATIENT OUTREACH (OUTPATIENT)
Dept: ADMINISTRATIVE | Facility: OTHER | Age: 66
End: 2021-10-25
Payer: MEDICARE

## 2021-10-25 VITALS
SYSTOLIC BLOOD PRESSURE: 160 MMHG | HEART RATE: 57 BPM | BODY MASS INDEX: 26 KG/M2 | OXYGEN SATURATION: 98 % | WEIGHT: 156.06 LBS | DIASTOLIC BLOOD PRESSURE: 84 MMHG | HEIGHT: 65 IN | RESPIRATION RATE: 16 BRPM

## 2021-10-25 DIAGNOSIS — I15.2 HYPERTENSION ASSOCIATED WITH DIABETES: Chronic | ICD-10-CM

## 2021-10-25 DIAGNOSIS — I25.10 CORONARY ARTERY DISEASE INVOLVING NATIVE CORONARY ARTERY OF NATIVE HEART WITHOUT ANGINA PECTORIS: Chronic | ICD-10-CM

## 2021-10-25 DIAGNOSIS — G47.00 INSOMNIA, UNSPECIFIED TYPE: ICD-10-CM

## 2021-10-25 DIAGNOSIS — G47.33 OSA (OBSTRUCTIVE SLEEP APNEA): Primary | ICD-10-CM

## 2021-10-25 DIAGNOSIS — E11.59 HYPERTENSION ASSOCIATED WITH DIABETES: Chronic | ICD-10-CM

## 2021-10-25 PROCEDURE — 1159F MED LIST DOCD IN RCRD: CPT | Mod: HCNC,CPTII,S$GLB, | Performed by: NURSE PRACTITIONER

## 2021-10-25 PROCEDURE — 1160F PR REVIEW ALL MEDS BY PRESCRIBER/CLIN PHARMACIST DOCUMENTED: ICD-10-PCS | Mod: HCNC,CPTII,S$GLB, | Performed by: NURSE PRACTITIONER

## 2021-10-25 PROCEDURE — 1159F PR MEDICATION LIST DOCUMENTED IN MEDICAL RECORD: ICD-10-PCS | Mod: HCNC,CPTII,S$GLB, | Performed by: NURSE PRACTITIONER

## 2021-10-25 PROCEDURE — 3046F HEMOGLOBIN A1C LEVEL >9.0%: CPT | Mod: HCNC,CPTII,S$GLB, | Performed by: NURSE PRACTITIONER

## 2021-10-25 PROCEDURE — 99204 OFFICE O/P NEW MOD 45 MIN: CPT | Mod: HCNC,S$GLB,, | Performed by: NURSE PRACTITIONER

## 2021-10-25 PROCEDURE — 3062F PR POS MACROALBUMINURIA RESULT DOCUMENTED/REVIEW: ICD-10-PCS | Mod: HCNC,CPTII,S$GLB, | Performed by: NURSE PRACTITIONER

## 2021-10-25 PROCEDURE — 99999 PR PBB SHADOW E&M-EST. PATIENT-LVL IV: ICD-10-PCS | Mod: PBBFAC,HCNC,, | Performed by: NURSE PRACTITIONER

## 2021-10-25 PROCEDURE — 3066F PR DOCUMENTATION OF TREATMENT FOR NEPHROPATHY: ICD-10-PCS | Mod: HCNC,CPTII,S$GLB, | Performed by: NURSE PRACTITIONER

## 2021-10-25 PROCEDURE — 3077F SYST BP >= 140 MM HG: CPT | Mod: HCNC,CPTII,S$GLB, | Performed by: NURSE PRACTITIONER

## 2021-10-25 PROCEDURE — 3079F DIAST BP 80-89 MM HG: CPT | Mod: HCNC,CPTII,S$GLB, | Performed by: NURSE PRACTITIONER

## 2021-10-25 PROCEDURE — 3066F NEPHROPATHY DOC TX: CPT | Mod: HCNC,CPTII,S$GLB, | Performed by: NURSE PRACTITIONER

## 2021-10-25 PROCEDURE — 4010F ACE/ARB THERAPY RXD/TAKEN: CPT | Mod: HCNC,CPTII,S$GLB, | Performed by: NURSE PRACTITIONER

## 2021-10-25 PROCEDURE — 3077F PR MOST RECENT SYSTOLIC BLOOD PRESSURE >= 140 MM HG: ICD-10-PCS | Mod: HCNC,CPTII,S$GLB, | Performed by: NURSE PRACTITIONER

## 2021-10-25 PROCEDURE — 3008F BODY MASS INDEX DOCD: CPT | Mod: HCNC,CPTII,S$GLB, | Performed by: NURSE PRACTITIONER

## 2021-10-25 PROCEDURE — 3046F PR MOST RECENT HEMOGLOBIN A1C LEVEL > 9.0%: ICD-10-PCS | Mod: HCNC,CPTII,S$GLB, | Performed by: NURSE PRACTITIONER

## 2021-10-25 PROCEDURE — 3008F PR BODY MASS INDEX (BMI) DOCUMENTED: ICD-10-PCS | Mod: HCNC,CPTII,S$GLB, | Performed by: NURSE PRACTITIONER

## 2021-10-25 PROCEDURE — 4010F PR ACE/ARB THEARPY RXD/TAKEN: ICD-10-PCS | Mod: HCNC,CPTII,S$GLB, | Performed by: NURSE PRACTITIONER

## 2021-10-25 PROCEDURE — 99204 PR OFFICE/OUTPT VISIT, NEW, LEVL IV, 45-59 MIN: ICD-10-PCS | Mod: HCNC,S$GLB,, | Performed by: NURSE PRACTITIONER

## 2021-10-25 PROCEDURE — 3062F POS MACROALBUMINURIA REV: CPT | Mod: HCNC,CPTII,S$GLB, | Performed by: NURSE PRACTITIONER

## 2021-10-25 PROCEDURE — 1160F RVW MEDS BY RX/DR IN RCRD: CPT | Mod: HCNC,CPTII,S$GLB, | Performed by: NURSE PRACTITIONER

## 2021-10-25 PROCEDURE — 99999 PR PBB SHADOW E&M-EST. PATIENT-LVL IV: CPT | Mod: PBBFAC,HCNC,, | Performed by: NURSE PRACTITIONER

## 2021-10-25 PROCEDURE — 3079F PR MOST RECENT DIASTOLIC BLOOD PRESSURE 80-89 MM HG: ICD-10-PCS | Mod: HCNC,CPTII,S$GLB, | Performed by: NURSE PRACTITIONER

## 2021-10-27 ENCOUNTER — PATIENT MESSAGE (OUTPATIENT)
Dept: SLEEP MEDICINE | Facility: HOSPITAL | Age: 66
End: 2021-10-27
Payer: MEDICARE

## 2021-11-01 ENCOUNTER — TELEPHONE (OUTPATIENT)
Dept: FAMILY MEDICINE | Facility: CLINIC | Age: 66
End: 2021-11-01
Payer: MEDICARE

## 2021-11-01 DIAGNOSIS — R80.9 PROTEINURIA, UNSPECIFIED TYPE: Primary | ICD-10-CM

## 2021-11-09 ENCOUNTER — TELEPHONE (OUTPATIENT)
Dept: FAMILY MEDICINE | Facility: CLINIC | Age: 66
End: 2021-11-09
Payer: MEDICARE

## 2021-11-10 DIAGNOSIS — N18.31 STAGE 3A CHRONIC KIDNEY DISEASE: Primary | ICD-10-CM

## 2021-11-11 DIAGNOSIS — M79.641 BILATERAL HAND PAIN: Primary | ICD-10-CM

## 2021-11-11 DIAGNOSIS — M79.642 BILATERAL HAND PAIN: Primary | ICD-10-CM

## 2021-11-19 ENCOUNTER — HOSPITAL ENCOUNTER (OUTPATIENT)
Dept: RADIOLOGY | Facility: HOSPITAL | Age: 66
Discharge: HOME | End: 2021-11-19
Attending: FAMILY MEDICINE
Payer: MEDICARE

## 2021-11-19 DIAGNOSIS — Z12.31 VISIT FOR SCREENING MAMMOGRAM: ICD-10-CM

## 2021-11-19 PROCEDURE — 77067 SCR MAMMO BI INCL CAD: CPT | Mod: TC,HCNC

## 2021-11-19 PROCEDURE — 77067 SCR MAMMO BI INCL CAD: CPT | Mod: 26,HCNC,, | Performed by: RADIOLOGY

## 2021-11-19 PROCEDURE — 77063 MAMMO DIGITAL SCREENING BILAT WITH TOMO: ICD-10-PCS | Mod: 26,HCNC,, | Performed by: RADIOLOGY

## 2021-11-19 PROCEDURE — 77063 BREAST TOMOSYNTHESIS BI: CPT | Mod: 26,HCNC,, | Performed by: RADIOLOGY

## 2021-11-19 PROCEDURE — 77067 MAMMO DIGITAL SCREENING BILAT WITH TOMO: ICD-10-PCS | Mod: 26,HCNC,, | Performed by: RADIOLOGY

## 2021-12-09 ENCOUNTER — PATIENT OUTREACH (OUTPATIENT)
Dept: ADMINISTRATIVE | Facility: HOSPITAL | Age: 66
End: 2021-12-09
Payer: MEDICARE

## 2021-12-10 ENCOUNTER — TELEPHONE (OUTPATIENT)
Dept: FAMILY MEDICINE | Facility: CLINIC | Age: 66
End: 2021-12-10
Payer: MEDICARE

## 2021-12-13 ENCOUNTER — PROCEDURE VISIT (OUTPATIENT)
Dept: SLEEP MEDICINE | Facility: CLINIC | Age: 66
End: 2021-12-13
Payer: MEDICARE

## 2021-12-13 DIAGNOSIS — G47.33 OSA (OBSTRUCTIVE SLEEP APNEA): Primary | ICD-10-CM

## 2021-12-13 PROCEDURE — 95806 SLEEP STUDY UNATT&RESP EFFT: CPT | Mod: 26,HCNC,S$GLB, | Performed by: INTERNAL MEDICINE

## 2021-12-13 PROCEDURE — 95806 PR SLEEP STUDY, UNATTENDED, SIMUL RECORD HR/O2 SAT/RESP FLOW/RESP EFFT: ICD-10-PCS | Mod: 26,HCNC,S$GLB, | Performed by: INTERNAL MEDICINE

## 2021-12-14 ENCOUNTER — TELEPHONE (OUTPATIENT)
Dept: PULMONOLOGY | Facility: CLINIC | Age: 66
End: 2021-12-14
Payer: MEDICARE

## 2021-12-14 DIAGNOSIS — G47.33 OSA (OBSTRUCTIVE SLEEP APNEA): Primary | ICD-10-CM

## 2021-12-17 ENCOUNTER — TELEPHONE (OUTPATIENT)
Dept: ADMINISTRATIVE | Facility: HOSPITAL | Age: 66
End: 2021-12-17
Payer: MEDICARE

## 2021-12-21 ENCOUNTER — TELEPHONE (OUTPATIENT)
Dept: FAMILY MEDICINE | Facility: CLINIC | Age: 66
End: 2021-12-21
Payer: MEDICARE

## 2022-01-26 ENCOUNTER — PATIENT OUTREACH (OUTPATIENT)
Dept: ADMINISTRATIVE | Facility: HOSPITAL | Age: 67
End: 2022-01-26
Payer: MEDICARE

## 2022-01-26 NOTE — PROGRESS NOTES
Working [employer] Report:     Reviewing chart for statin therapy compliance, or intolerance. Neither noted in chart. Updated appt notes for upcoming appt in 03/22.

## 2022-01-28 ENCOUNTER — OFFICE VISIT (OUTPATIENT)
Dept: INTERNAL MEDICINE | Facility: CLINIC | Age: 67
End: 2022-01-28
Payer: MEDICARE

## 2022-01-28 VITALS
RESPIRATION RATE: 18 BRPM | TEMPERATURE: 97 F | HEIGHT: 65 IN | HEART RATE: 86 BPM | DIASTOLIC BLOOD PRESSURE: 98 MMHG | BODY MASS INDEX: 25.27 KG/M2 | OXYGEN SATURATION: 98 % | SYSTOLIC BLOOD PRESSURE: 178 MMHG | WEIGHT: 151.69 LBS

## 2022-01-28 DIAGNOSIS — G56.23 CUBITAL TUNNEL SYNDROME, BILATERAL: Primary | ICD-10-CM

## 2022-01-28 DIAGNOSIS — M25.551 RIGHT HIP PAIN: ICD-10-CM

## 2022-01-28 PROCEDURE — 99214 OFFICE O/P EST MOD 30 MIN: CPT | Mod: HCNC,S$GLB,, | Performed by: NURSE PRACTITIONER

## 2022-01-28 PROCEDURE — 99999 PR PBB SHADOW E&M-EST. PATIENT-LVL III: ICD-10-PCS | Mod: PBBFAC,HCNC,, | Performed by: NURSE PRACTITIONER

## 2022-01-28 PROCEDURE — 3080F PR MOST RECENT DIASTOLIC BLOOD PRESSURE >= 90 MM HG: ICD-10-PCS | Mod: HCNC,CPTII,S$GLB, | Performed by: NURSE PRACTITIONER

## 2022-01-28 PROCEDURE — 1101F PT FALLS ASSESS-DOCD LE1/YR: CPT | Mod: HCNC,CPTII,S$GLB, | Performed by: NURSE PRACTITIONER

## 2022-01-28 PROCEDURE — 1101F PR PT FALLS ASSESS DOC 0-1 FALLS W/OUT INJ PAST YR: ICD-10-PCS | Mod: HCNC,CPTII,S$GLB, | Performed by: NURSE PRACTITIONER

## 2022-01-28 PROCEDURE — 3077F SYST BP >= 140 MM HG: CPT | Mod: HCNC,CPTII,S$GLB, | Performed by: NURSE PRACTITIONER

## 2022-01-28 PROCEDURE — 3077F PR MOST RECENT SYSTOLIC BLOOD PRESSURE >= 140 MM HG: ICD-10-PCS | Mod: HCNC,CPTII,S$GLB, | Performed by: NURSE PRACTITIONER

## 2022-01-28 PROCEDURE — 1125F PR PAIN SEVERITY QUANTIFIED, PAIN PRESENT: ICD-10-PCS | Mod: HCNC,CPTII,S$GLB, | Performed by: NURSE PRACTITIONER

## 2022-01-28 PROCEDURE — 3288F PR FALLS RISK ASSESSMENT DOCUMENTED: ICD-10-PCS | Mod: HCNC,CPTII,S$GLB, | Performed by: NURSE PRACTITIONER

## 2022-01-28 PROCEDURE — 99214 PR OFFICE/OUTPT VISIT, EST, LEVL IV, 30-39 MIN: ICD-10-PCS | Mod: HCNC,S$GLB,, | Performed by: NURSE PRACTITIONER

## 2022-01-28 PROCEDURE — 3080F DIAST BP >= 90 MM HG: CPT | Mod: HCNC,CPTII,S$GLB, | Performed by: NURSE PRACTITIONER

## 2022-01-28 PROCEDURE — 99999 PR PBB SHADOW E&M-EST. PATIENT-LVL III: CPT | Mod: PBBFAC,HCNC,, | Performed by: NURSE PRACTITIONER

## 2022-01-28 PROCEDURE — 3288F FALL RISK ASSESSMENT DOCD: CPT | Mod: HCNC,CPTII,S$GLB, | Performed by: NURSE PRACTITIONER

## 2022-01-28 PROCEDURE — 3008F BODY MASS INDEX DOCD: CPT | Mod: HCNC,CPTII,S$GLB, | Performed by: NURSE PRACTITIONER

## 2022-01-28 PROCEDURE — 1125F AMNT PAIN NOTED PAIN PRSNT: CPT | Mod: HCNC,CPTII,S$GLB, | Performed by: NURSE PRACTITIONER

## 2022-01-28 PROCEDURE — 3008F PR BODY MASS INDEX (BMI) DOCUMENTED: ICD-10-PCS | Mod: HCNC,CPTII,S$GLB, | Performed by: NURSE PRACTITIONER

## 2022-01-28 RX ORDER — TRAMADOL HYDROCHLORIDE 50 MG/1
50 TABLET ORAL EVERY 12 HOURS PRN
Qty: 10 TABLET | Refills: 0 | Status: SHIPPED | OUTPATIENT
Start: 2022-01-28

## 2022-01-28 RX ORDER — IPRATROPIUM BROMIDE 42 UG/1
2 SPRAY, METERED NASAL DAILY
COMMUNITY
Start: 2021-12-22

## 2022-01-28 RX ORDER — KETOROLAC TROMETHAMINE 10 MG/1
10 TABLET, FILM COATED ORAL EVERY 8 HOURS PRN
Qty: 12 TABLET | Refills: 0 | Status: SHIPPED | OUTPATIENT
Start: 2022-01-28 | End: 2022-02-02

## 2022-01-31 NOTE — PROGRESS NOTES
Subjective:       Patient ID: Lydia Danielson is a 66 y.o. female.    Chief Complaint: Hip Pain (Right)    HPI    Pt here with above complaint  Radiates to R thigh  No trauma  Pain 10/10  Also reports CTS flared up- have EMG recent- wants to follow to next step    Past Medical History:   Diagnosis Date    Allergic rhinitis, cause unspecified     Anxiety     CAD (coronary artery disease)     S/P MI in ; followed by Dr. Blevins, cardiologist    Depression     Diabetes mellitus, type 2     External hemorrhoid     GERD (gastroesophageal reflux disease)     Hyperlipidemia     Hypertension     Keloid     Multinodular goiter     Neck arthritis 2016    NATASHA (obstructive sleep apnea)     Postmenopausal     No history of abnormal pap smear     Past Surgical History:   Procedure Laterality Date    APPENDECTOMY      BREAST CYST EXCISION Left     over 20 yrs ago    CARDIAC CATHETERIZATION      COLONOSCOPY N/A 3/1/2017    Procedure: COLONOSCOPY;  Surgeon: Chase Moffett III, MD;  Location: [facility];  Service: Endoscopy;  Laterality: N/A;    HYSTERECTOMY      in her 50's    Left breast lumpectomy      NO cancer    OOPHORECTOMY      TLH/BSO      Due to noncancerous reasons     Social History     Socioeconomic History    Marital status:     Number of children: 1   Occupational History    Occupation: Housekeeping     Comment: [employer]   Tobacco Use    Smoking status: Former Smoker     Packs/day: 0.10     Years: 0.20     Pack years: 0.02     Types: Cigarettes     Quit date: 1993     Years since quittin.5    Smokeless tobacco: Never Used   Substance and Sexual Activity    Alcohol use: No     Alcohol/week: 0.0 standard drinks    Drug use: No    Sexual activity: Not Currently     Partners: Male     Birth control/protection: Surgical, Post-menopausal   Social History Narrative    She wears seatbelt.     Review of patient's allergies indicates:  No Known  "Allergies  Current Outpatient Medications   Medication Sig    aspirin (ECOTRIN) 81 MG EC tablet Take 81 mg by mouth once daily.    atorvastatin (LIPITOR) 40 MG tablet Take 1 tablet (40 mg total) by mouth nightly.    benazepriL (LOTENSIN) 40 MG tablet Take 1 tablet (40 mg total) by mouth once daily.    blood sugar diagnostic (ONETOUCH ULTRA TEST) Strp 1 strip by Misc.(Non-Drug; Combo Route) route 2 (two) times daily.    blood sugar diagnostic Strp To check BG 2 times daily, to use with insurance preferred meter    blood-glucose meter kit To check BG 2 times daily, to use with insurance preferred meter    fluticasone propionate (FLONASE) 50 mcg/actuation nasal spray 2 Spray, Suspension Nasal Every day.  each nostril    glimepiride (AMARYL) 4 MG tablet Take 1 tablet (4 mg total) by mouth 2 (two) times daily.    hydrALAZINE (APRESOLINE) 50 MG tablet Take 1 tablet (50 mg total) by mouth every 12 (twelve) hours.    insulin glargine, TOUJEO, (TOUJEO SOLOSTAR U-300 INSULIN) 300 unit/mL (1.5 mL) InPn pen Inject 32 Units into the skin once daily. INJECT FROM 32 UNITS DAILY UP TO MAX OF 48 UNTIS DAILY AS DIRECTED    ipratropium (ATROVENT) 42 mcg (0.06 %) nasal spray 2 sprays once daily.    isosorbide dinitrate (ISORDIL) 20 MG tablet Take 1 tablet (20 mg total) by mouth 2 (two) times daily.    lancets [facility] To check BG 2 times daily, to use with insurance preferred meter    pen needle, diabetic 32 gauge x 5/32" Ndle Use daily with Lantus    ketorolac (TORADOL) 10 mg tablet Take 1 tablet (10 mg total) by mouth every 8 (eight) hours as needed for Pain.    sertraline (ZOLOFT) 50 MG tablet Take 1 tablet (50 mg total) by mouth once daily. For anxiety    traMADoL (ULTRAM) 50 mg tablet Take 1 tablet (50 mg total) by mouth every 12 (twelve) hours as needed for Pain (for breakthrough pain unrelieved by toradol).     No current facility-administered medications for this visit.           Review of Systems   Constitutional: " Negative for activity change, appetite change, chills, diaphoresis, fatigue, fever and unexpected weight change.   HENT: Negative for congestion, ear pain, postnasal drip, rhinorrhea, sinus pressure, sinus pain, sneezing, sore throat, tinnitus, trouble swallowing and voice change.    Eyes: Negative for photophobia, pain and visual disturbance.   Respiratory: Negative for cough, chest tightness, shortness of breath and wheezing.    Cardiovascular: Negative for chest pain, palpitations and leg swelling.   Gastrointestinal: Negative for abdominal distention, abdominal pain, constipation, diarrhea, nausea and vomiting.   Genitourinary: Negative for decreased urine volume, difficulty urinating, dysuria, flank pain, frequency, hematuria and urgency.   Musculoskeletal: Positive for arthralgias and myalgias. Negative for back pain, joint swelling, neck pain and neck stiffness.   Allergic/Immunologic: Negative for immunocompromised state.   Neurological: Negative for dizziness, tremors, seizures, syncope, facial asymmetry, speech difficulty, weakness, light-headedness, numbness and headaches.   Hematological: Negative for adenopathy. Does not bruise/bleed easily.   Psychiatric/Behavioral: Negative for confusion and sleep disturbance.       Objective:      Physical Exam  Vitals reviewed.   Cardiovascular:      Rate and Rhythm: Normal rate and regular rhythm.      Pulses: Normal pulses.      Heart sounds: Normal heart sounds.   Pulmonary:      Effort: Pulmonary effort is normal.      Breath sounds: Normal breath sounds.   Musculoskeletal:      Right wrist: Tenderness present. Decreased range of motion.      Right hip: Tenderness present. No deformity, lacerations, bony tenderness or crepitus. Decreased range of motion. Decreased strength.      Comments: R wrist +tinels/plalens     Skin:     General: Skin is warm and dry.   Neurological:      Mental Status: She is alert and oriented to person, place, and time.   Psychiatric:          Mood and Affect: Mood normal.         Assessment:     Vitals:    01/28/22 1119   BP: (!) 178/98   Pulse: 86   Resp: 18   Temp: 96.9 °F (36.1 °C)         1. Cubital tunnel syndrome, bilateral    2. Right hip pain        Plan:   Cubital tunnel syndrome, bilateral  -     Ambulatory referral/consult to Orthopedics; Future; Expected date: 02/04/2022    Right hip pain    Other orders  -     ketorolac (TORADOL) 10 mg tablet; Take 1 tablet (10 mg total) by mouth every 8 (eight) hours as needed for Pain.  Dispense: 12 tablet; Refill: 0  -     traMADoL (ULTRAM) 50 mg tablet; Take 1 tablet (50 mg total) by mouth every 12 (twelve) hours as needed for Pain (for breakthrough pain unrelieved by toradol).  Dispense: 10 tablet; Refill: 0      BP elevated- monitor closely and f/u with PCP  Ortho referral  Meds/se discussed

## 2022-02-25 ENCOUNTER — TELEPHONE (OUTPATIENT)
Dept: PULMONOLOGY | Facility: CLINIC | Age: 67
End: 2022-02-25
Payer: MEDICARE

## 2022-02-25 NOTE — TELEPHONE ENCOUNTER
Phoned pt, no answer, left voicemail.   np lejeune will not be available on 3/11/2022.appointment will need to be rescheduled. Appointment rescheduled to next avail on 4/22/2022 at 1020 at the [facility].   If this time and date does not work, let me know by calling [phone].

## 2022-03-28 ENCOUNTER — TELEPHONE (OUTPATIENT)
Dept: PULMONOLOGY | Facility: CLINIC | Age: 67
End: 2022-03-28
Payer: MEDICARE

## 2022-03-28 NOTE — TELEPHONE ENCOUNTER
----- Message from Muriel Stevens sent at 3/28/2022 11:26 AM CDT -----  Regarding: PAP  Patient returned pap equipment today ama/not-compliant.

## 2022-04-21 ENCOUNTER — PATIENT OUTREACH (OUTPATIENT)
Dept: ADMINISTRATIVE | Facility: OTHER | Age: 67
End: 2022-04-21

## 2022-04-21 ENCOUNTER — PATIENT MESSAGE (OUTPATIENT)
Dept: ADMINISTRATIVE | Facility: HOSPITAL | Age: 67
End: 2022-04-21

## 2022-04-21 DIAGNOSIS — E11.9 DIABETES MELLITUS WITHOUT COMPLICATION: Primary | ICD-10-CM

## 2022-04-21 NOTE — PROGRESS NOTES
Health Maintenance Due   Topic Date Due    Shingles Vaccine (2 of 3) 03/03/2016    Hemoglobin A1c  01/21/2022     Updates were requested from care everywhere.  Chart was reviewed for overdue Proactive [facility] Encounters (LINDA) topics (CRS, Breast Cancer Screening, Eye exam)  Health Maintenance has been updated.  LINKS immunization registry triggered.  Immunizations were reconciled.

## 2022-04-22 ENCOUNTER — PATIENT OUTREACH (OUTPATIENT)
Dept: ADMINISTRATIVE | Facility: HOSPITAL | Age: 67
End: 2022-04-22

## 2022-04-22 NOTE — PROGRESS NOTES
Working Diabetes Report.     [facility]-no results found.  [facility]-no results found.  Care Everywhere-no recent results found.    Called to schedule follow up diabetes with A1C. Pt states she has changed pcp.  Chart updated.

## 2022-08-05 ENCOUNTER — PES CALL (OUTPATIENT)
Dept: ADMINISTRATIVE | Facility: CLINIC | Age: 67
End: 2022-08-05

## 2022-08-11 ENCOUNTER — PES CALL (OUTPATIENT)
Dept: ADMINISTRATIVE | Facility: CLINIC | Age: 67
End: 2022-08-11

## 2022-10-05 ENCOUNTER — PES CALL (OUTPATIENT)
Dept: ADMINISTRATIVE | Facility: CLINIC | Age: 67
End: 2022-10-05

## 2023-06-22 ENCOUNTER — HOSPITAL ENCOUNTER (EMERGENCY)
Facility: HOSPITAL | Age: 68
Discharge: HOME | End: 2023-06-22
Attending: EMERGENCY MEDICINE
Payer: MEDICARE

## 2023-06-22 VITALS
WEIGHT: 129.88 LBS | DIASTOLIC BLOOD PRESSURE: 93 MMHG | BODY MASS INDEX: 21.61 KG/M2 | TEMPERATURE: 97 F | HEART RATE: 71 BPM | SYSTOLIC BLOOD PRESSURE: 178 MMHG | OXYGEN SATURATION: 99 % | RESPIRATION RATE: 18 BRPM

## 2023-06-22 DIAGNOSIS — A08.4 VIRAL GASTROENTERITIS: Primary | ICD-10-CM

## 2023-06-22 DIAGNOSIS — R73.9 HYPERGLYCEMIA: ICD-10-CM

## 2023-06-22 DIAGNOSIS — R11.2 NAUSEA & VOMITING: ICD-10-CM

## 2023-06-22 LAB
ALBUMIN SERPL BCP-MCNC: 3.7 G/DL (ref 3.5–5.2)
ALP SERPL-CCNC: 102 U/L (ref 55–135)
ALT SERPL W/O P-5'-P-CCNC: 11 U/L (ref 10–44)
ANION GAP SERPL CALC-SCNC: 15 MMOL/L (ref 8–16)
AST SERPL-CCNC: 15 U/L (ref 10–40)
BASOPHILS # BLD AUTO: 0.02 K/UL (ref 0–0.2)
BASOPHILS NFR BLD: 0.2 % (ref 0–1.9)
BILIRUB SERPL-MCNC: 1.1 MG/DL (ref 0.1–1)
BUN SERPL-MCNC: 38 MG/DL (ref 8–23)
CALCIUM SERPL-MCNC: 10.4 MG/DL (ref 8.7–10.5)
CHLORIDE SERPL-SCNC: 91 MMOL/L (ref 95–110)
CO2 SERPL-SCNC: 23 MMOL/L (ref 23–29)
CREAT SERPL-MCNC: 1.8 MG/DL (ref 0.5–1.4)
DIFFERENTIAL METHOD: ABNORMAL
EOSINOPHIL # BLD AUTO: 0 K/UL (ref 0–0.5)
EOSINOPHIL NFR BLD: 0 % (ref 0–8)
ERYTHROCYTE [DISTWIDTH] IN BLOOD BY AUTOMATED COUNT: 11.3 % (ref 11.5–14.5)
EST. GFR  (NO RACE VARIABLE): 30 ML/MIN/1.73 M^2
GLUCOSE SERPL-MCNC: 536 MG/DL (ref 70–110)
HCT VFR BLD AUTO: 43.6 % (ref 37–48.5)
HGB BLD-MCNC: 15.5 G/DL (ref 12–16)
IMM GRANULOCYTES # BLD AUTO: 0.03 K/UL (ref 0–0.04)
IMM GRANULOCYTES NFR BLD AUTO: 0.3 % (ref 0–0.5)
LIPASE SERPL-CCNC: 14 U/L (ref 4–60)
LYMPHOCYTES # BLD AUTO: 2.4 K/UL (ref 1–4.8)
LYMPHOCYTES NFR BLD: 27.3 % (ref 18–48)
MCH RBC QN AUTO: 29.9 PG (ref 27–31)
MCHC RBC AUTO-ENTMCNC: 35.6 G/DL (ref 32–36)
MCV RBC AUTO: 84 FL (ref 82–98)
MONOCYTES # BLD AUTO: 0.7 K/UL (ref 0.3–1)
MONOCYTES NFR BLD: 7.9 % (ref 4–15)
NEUTROPHILS # BLD AUTO: 5.5 K/UL (ref 1.8–7.7)
NEUTROPHILS NFR BLD: 64.3 % (ref 38–73)
NRBC BLD-RTO: 0 /100 WBC
PLATELET # BLD AUTO: 449 K/UL (ref 150–450)
PMV BLD AUTO: 10.8 FL (ref 9.2–12.9)
POCT GLUCOSE: 270 MG/DL (ref 70–110)
POCT GLUCOSE: 403 MG/DL (ref 70–110)
POTASSIUM SERPL-SCNC: 3.9 MMOL/L (ref 3.5–5.1)
PROT SERPL-MCNC: 8 G/DL (ref 6–8.4)
RBC # BLD AUTO: 5.18 M/UL (ref 4–5.4)
SODIUM SERPL-SCNC: 129 MMOL/L (ref 136–145)
WBC # BLD AUTO: 8.6 K/UL (ref 3.9–12.7)

## 2023-06-22 PROCEDURE — 96361 HYDRATE IV INFUSION ADD-ON: CPT

## 2023-06-22 PROCEDURE — 63600175 PHARM REV CODE 636 W HCPCS: Performed by: EMERGENCY MEDICINE

## 2023-06-22 PROCEDURE — 80053 COMPREHEN METABOLIC PANEL: CPT | Performed by: REGISTERED NURSE

## 2023-06-22 PROCEDURE — 96374 THER/PROPH/DIAG INJ IV PUSH: CPT

## 2023-06-22 PROCEDURE — 82962 GLUCOSE BLOOD TEST: CPT | Mod: 91

## 2023-06-22 PROCEDURE — 63600175 PHARM REV CODE 636 W HCPCS: Performed by: REGISTERED NURSE

## 2023-06-22 PROCEDURE — 83690 ASSAY OF LIPASE: CPT | Performed by: REGISTERED NURSE

## 2023-06-22 PROCEDURE — 25000003 PHARM REV CODE 250: Performed by: EMERGENCY MEDICINE

## 2023-06-22 PROCEDURE — 25000003 PHARM REV CODE 250: Performed by: REGISTERED NURSE

## 2023-06-22 PROCEDURE — 99284 EMERGENCY DEPT VISIT MOD MDM: CPT | Mod: 25

## 2023-06-22 PROCEDURE — 96372 THER/PROPH/DIAG INJ SC/IM: CPT | Mod: 59 | Performed by: EMERGENCY MEDICINE

## 2023-06-22 PROCEDURE — 96375 TX/PRO/DX INJ NEW DRUG ADDON: CPT

## 2023-06-22 PROCEDURE — 85025 COMPLETE CBC W/AUTO DIFF WBC: CPT | Performed by: REGISTERED NURSE

## 2023-06-22 RX ORDER — ONDANSETRON 2 MG/ML
4 INJECTION INTRAMUSCULAR; INTRAVENOUS
Status: COMPLETED | OUTPATIENT
Start: 2023-06-22 | End: 2023-06-22

## 2023-06-22 RX ORDER — ONDANSETRON 4 MG/1
4 TABLET, FILM COATED ORAL EVERY 6 HOURS PRN
Qty: 12 TABLET | Refills: 0 | Status: SHIPPED | OUTPATIENT
Start: 2023-06-22 | End: 2023-10-19

## 2023-06-22 RX ADMIN — SODIUM CHLORIDE 1000 ML: 9 INJECTION, SOLUTION INTRAVENOUS at 03:06

## 2023-06-22 RX ADMIN — INSULIN HUMAN 6 UNITS: 100 INJECTION, SOLUTION PARENTERAL at 03:06

## 2023-06-22 RX ADMIN — ONDANSETRON 4 MG: 2 INJECTION INTRAMUSCULAR; INTRAVENOUS at 01:06

## 2023-06-22 RX ADMIN — SODIUM CHLORIDE 1000 ML: 9 INJECTION, SOLUTION INTRAVENOUS at 01:06

## 2023-06-22 NOTE — DISCHARGE INSTRUCTIONS
Drink plenty of fluids.  Continue home medications.  Zofran for nausea.  Have her abdomen re-examined in 1-2 days by your doctor.  Return as needed for any worsening symptoms, problems, questions or concerns.

## 2023-06-22 NOTE — FIRST PROVIDER EVALUATION
Medical screening examination initiated.  I have conducted a focused provider triage encounter, findings are as follows:    Brief history of present illness:  Patient reports nausea/vomiting/diarrhea and abdominal pain that began several days ago    Vitals:    06/22/23 1234 06/22/23 1235 06/22/23 1237   BP: 110/79     Pulse: 102     Resp: 18     Temp:  97.4 °F (36.3 °C)    TempSrc:  Oral    SpO2: 97%     Weight:   58.9 kg (129 lb 13.6 oz)       Pertinent physical exam:  No acute distress, vital signs stable    Brief workup plan:  Workup and further evaluation    Preliminary workup initiated; this workup will be continued and followed by the physician or advanced practice provider that is assigned to the patient when roomed.

## 2023-06-22 NOTE — ED PROVIDER NOTES
SCRIBE #1 NOTE: I, Lydia Awan, am scribing for, and in the presence of, Gen Lo Jr., MD. I have scribed the entire note.       History     Chief Complaint   Patient presents with    Vomiting     Pt reports nausea, vomiting, diarrhea since Thursday. Pt also reports fatigue.      Review of patient's allergies indicates:  No Known Allergies      History of Present Illness     HPI    6/22/2023, 12:46 PM  History obtained from the patient      History of Present Illness: Lydia Danielson is a 67 y.o. female patient with a PMHx of HTN, CAD, HLD, and DM who presents to the Emergency Department for evaluation of nausea/vomiting which onset 1 week ago. Symptoms are constant and moderate in severity. No mitigating or exacerbating factors reported. Associated sxs include diarrhea, fatigue, and slight abdominal pain. Patient denies any fever, chills, headache, CP, and all other sxs at this time. No Prior Tx reported. No further complaints or concerns at this time.       Arrival mode: Personal vehicle    PCP: [facility]        Past Medical History:  Past Medical History:   Diagnosis Date    Allergic rhinitis, cause unspecified     Anxiety     CAD (coronary artery disease)     S/P MI in 2002; followed by Dr. Blevins, cardiologist    Depression     Diabetes mellitus, type 2     External hemorrhoid     GERD (gastroesophageal reflux disease)     Hyperlipidemia     Hypertension     Keloid     Multinodular goiter     Neck arthritis 07/28/2016    NATASHA (obstructive sleep apnea)     Postmenopausal     No history of abnormal pap smear       Past Surgical History:  Past Surgical History:   Procedure Laterality Date    APPENDECTOMY  2011    BREAST CYST EXCISION Left     over 20 yrs ago    CARDIAC CATHETERIZATION      COLONOSCOPY N/A 3/1/2017    Procedure: COLONOSCOPY;  Surgeon: Chase Moffett III, MD;  Location: [facility];  Service: Endoscopy;  Laterality: N/A;    HYSTERECTOMY      in her 50's    Left breast  lumpectomy      NO cancer    OOPHORECTOMY      TLH/BSO      Due to noncancerous reasons         Family History:  Family History   Problem Relation Age of Onset    Arthritis Mother     Lupus Sister     Diabetes Brother     Hypertension Brother     Diabetes Paternal Aunt     Cancer Maternal Cousin     Breast cancer Maternal Cousin     Cancer Cousin     Heart disease Sister     Diabetes Sister     Hypertension Sister     Diabetes Sister     Hypertension Sister     Diabetes Brother     Hypertension Brother     Cancer Brother         Pancreatic and lung cancer (Smoker)    Hypertension Son     Stroke Neg Hx        Social History:  Social History     Tobacco Use    Smoking status: Former     Packs/day: 0.10     Years: 0.20     Pack years: 0.02     Types: Cigarettes     Quit date: 1993     Years since quittin.9    Smokeless tobacco: Never   Substance and Sexual Activity    Alcohol use: No     Alcohol/week: 0.0 standard drinks    Drug use: No    Sexual activity: Not Currently     Partners: Male     Birth control/protection: Surgical, Post-menopausal        Review of Systems     Review of Systems   Constitutional:  Positive for fatigue. Negative for chills and fever.   HENT:  Negative for congestion and sore throat.    Respiratory:  Negative for shortness of breath.    Cardiovascular:  Negative for chest pain.   Gastrointestinal:  Positive for abdominal pain, diarrhea, nausea and vomiting.   Genitourinary:  Negative for dysuria.   Musculoskeletal:  Negative for back pain.   Skin:  Negative for rash.   Neurological:  Negative for weakness and headaches.   Hematological:  Does not bruise/bleed easily.      Physical Exam     Initial Vitals   BP Pulse Resp Temp SpO2   23 1234 23 1234 23 1234 23 1235 23 1234   110/79 102 18 97.4 °F (36.3 °C) 97 %      MAP       --                 Physical Exam  Nursing Notes and Vital Signs Reviewed.  Constitutional: Patient is in no acute distress.  Well-developed and well-nourished.  Head: Atraumatic. Normocephalic.  Eyes:  EOM intact.  No scleral icterus.  ENT: Mucous membranes are moist.  Nares clear   Neck:  Full ROM. No JVD.  Cardiovascular: Regular rate. Regular rhythm No murmurs, rubs, or gallops. Distal pulses are 2+ and symmetric  Pulmonary/Chest: No respiratory distress. Clear to auscultation bilaterally. No wheezing or rales.  Equal chest wall rise bilaterally  Abdominal: Soft and non-distended.  There is no tenderness.  No rebound, guarding, or rigidity. Good bowel sounds.  Genitourinary: No CVA tenderness.  No suprapubic tenderness  Musculoskeletal: Moves all extremities. No obvious deformities.  5 x 5 strength in all extremities   Skin: Warm and dry.  Neurological:  Alert, awake, and appropriate.  Normal speech.  No acute focal neurological deficits are appreciated.  Two through 12 intact bilaterally.  Psychiatric: Normal affect. Good eye contact. Appropriate in content.    ED Course   Procedures  ED Vital Signs:  Vitals:    06/22/23 1234 06/22/23 1235 06/22/23 1237 06/22/23 1330   BP: 110/79   (!) 143/90   Pulse: 102   84   Resp: 18      Temp:  97.4 °F (36.3 °C)     TempSrc:  Oral     SpO2: 97%   99%   Weight:   58.9 kg (129 lb 13.6 oz)     06/22/23 1400 06/22/23 1503 06/22/23 1530 06/22/23 1600   BP: (!) 155/87 (!) 156/82 (!) 142/106 (!) 172/84   Pulse: 79 74 76 74   Resp:       Temp:       TempSrc:       SpO2: 98% 99% 99% 98%   Weight:        06/22/23 1700   BP: (!) 159/79   Pulse: 69   Resp:    Temp:    TempSrc:    SpO2: 99%   Weight:        Abnormal Lab Results:  Labs Reviewed   CBC W/ AUTO DIFFERENTIAL - Abnormal; Notable for the following components:       Result Value    RDW 11.3 (*)     All other components within normal limits   COMPREHENSIVE METABOLIC PANEL - Abnormal; Notable for the following components:    Sodium 129 (*)     Chloride 91 (*)     Glucose 536 (*)     BUN 38 (*)     Creatinine 1.8 (*)     Total Bilirubin 1.1 (*)     eGFR  30 (*)     All other components within normal limits    Narrative:     glucose critical result(s) called and verbal readback obtained from   gerardo roberts rn by [facility] 06/22/2023 15:18   POCT GLUCOSE - Abnormal; Notable for the following components:    POCT Glucose 403 (*)     All other components within normal limits   POCT GLUCOSE - Abnormal; Notable for the following components:    POCT Glucose 270 (*)     All other components within normal limits   LIPASE   URINALYSIS, REFLEX TO URINE CULTURE   POCT GLUCOSE MONITORING CONTINUOUS   POCT GLUCOSE MONITORING CONTINUOUS        All Lab Results:  Results for orders placed or performed during the hospital encounter of 06/22/23   CBC auto differential   Result Value Ref Range    WBC 8.60 3.90 - 12.70 K/uL    RBC 5.18 4.00 - 5.40 M/uL    Hemoglobin 15.5 12.0 - 16.0 g/dL    Hematocrit 43.6 37.0 - 48.5 %    MCV 84 82 - 98 fL    MCH 29.9 27.0 - 31.0 pg    MCHC 35.6 32.0 - 36.0 g/dL    RDW 11.3 (L) 11.5 - 14.5 %    Platelets 449 150 - 450 K/uL    MPV 10.8 9.2 - 12.9 fL    Immature Granulocytes 0.3 0.0 - 0.5 %    Gran # (ANC) 5.5 1.8 - 7.7 K/uL    Immature Grans (Abs) 0.03 0.00 - 0.04 K/uL    Lymph # 2.4 1.0 - 4.8 K/uL    Mono # 0.7 0.3 - 1.0 K/uL    Eos # 0.0 0.0 - 0.5 K/uL    Baso # 0.02 0.00 - 0.20 K/uL    nRBC 0 0 /100 WBC    Gran % 64.3 38.0 - 73.0 %    Lymph % 27.3 18.0 - 48.0 %    Mono % 7.9 4.0 - 15.0 %    Eosinophil % 0.0 0.0 - 8.0 %    Basophil % 0.2 0.0 - 1.9 %    Differential Method Automated    Comprehensive metabolic panel   Result Value Ref Range    Sodium 129 (L) 136 - 145 mmol/L    Potassium 3.9 3.5 - 5.1 mmol/L    Chloride 91 (L) 95 - 110 mmol/L    CO2 23 23 - 29 mmol/L    Glucose 536 (HH) 70 - 110 mg/dL    BUN 38 (H) 8 - 23 mg/dL    Creatinine 1.8 (H) 0.5 - 1.4 mg/dL    Calcium 10.4 8.7 - 10.5 mg/dL    Total Protein 8.0 6.0 - 8.4 g/dL    Albumin 3.7 3.5 - 5.2 g/dL    Total Bilirubin 1.1 (H) 0.1 - 1.0 mg/dL    Alkaline Phosphatase 102 55 - 135 U/L    AST 15 10 -  40 U/L    ALT 11 10 - 44 U/L    eGFR 30 (A) >60 mL/min/1.73 m^2    Anion Gap 15 8 - 16 mmol/L   Lipase   Result Value Ref Range    Lipase 14 4 - 60 U/L   POCT glucose   Result Value Ref Range    POCT Glucose 403 (H) 70 - 110 mg/dL   POCT glucose   Result Value Ref Range    POCT Glucose 270 (H) 70 - 110 mg/dL         Imaging Results:  Imaging Results              X-Ray Abdomen AP 1 View (KUB) (Final result)  Result time 06/22/23 14:11:20      Final result by JONATHAN Allen Sr., MD (06/22/23 14:11:20)                   Impression:      There is a prominent amount of fecal material within the colon.      Electronically signed by: Antonio Allen MD  Date:    06/22/2023  Time:    14:11               Narrative:    EXAMINATION:  XR ABDOMEN AP 1 VIEW    CLINICAL HISTORY:  Nausea with vomiting, unspecified    COMPARISON:  None    FINDINGS:  There is a prominent amount of fecal material within the colon.  There is no pneumoperitoneum. The bony structures appear intact.                                                The Emergency Provider reviewed the vital signs and test results, which are outlined above.     ED Discussion       5:27 PM: Reassessed pt at this time.  Discussed with pt all pertinent ED information and results. Discussed pt dx and plan of tx. Gave pt all f/u and return to the ED instructions. All questions and concerns were addressed at this time. Pt expresses understanding of information and instructions, and is comfortable with plan to discharge. Pt is stable for discharge.    I discussed with patient and/or family/caretaker that evaluation in the ED does not suggest any emergent or life threatening medical conditions requiring immediate intervention beyond what was provided in the ED, and I believe patient is safe for discharge.  Regardless, an unremarkable evaluation in the ED does not preclude the development or presence of a serious of life threatening condition. As such, patient was instructed to  return immediately for any worsening or change in current symptoms.         Medical Decision Making:   Initial Assessment:   Patient presents complaining of nausea and vomiting for the past several days.  She has no abdominal pain.  No diarrhea.  No fever chills.  She does have a history of diabetes.  Differential Diagnosis:   Viral gastroenteritis, diarrhea, intra-abdominal infection, intra-abdominal abscess, hyperglycemia, DKA.  Clinical Tests:   Lab Tests: Ordered and Reviewed  Radiological Study: Ordered and Reviewed  ED Management:  Patient was evaluated history and physical examination.  I ordered x-ray of her abdomen which showed prominent fecal matter but no obstruction.  Ordered interpreted all laboratory studies.  She had a CMP showing pseudo hyponatremia at 1:29 a.m. with a glucose of 536.  She would a BUN of 38 and creatinine 1.8 but her anion gap was normal at 15.  Her lipase was normal at 14.  Her CBC was normal.  There was no left shift or leukocytosis noted.  Clinically patient has viral gastroenteritis.  She is feeling better and tolerating p.o. now.  She denies any vomiting today and notes all of her symptoms for yesterday and the day before.  She is hyperglycemic received several units of IV and subcu insulin along with IV fluids.  We have brought her blood sugar down to below 300.  There is no evidence of DKA with a normal anion gap.  Patient is not unstable.  She is stable safe for discharge in my opinion.  Discussed with her all findings as well as the plan of care.  She verbalized agreement understanding with all instructions seems reliable.  She is safe for discharge my opinion.         ED Medication(s):  Medications   sodium chloride 0.9% bolus 1,000 mL 1,000 mL (0 mLs Intravenous Stopped 6/22/23 1426)   ondansetron injection 4 mg (4 mg Intravenous Given 6/22/23 1326)   sodium chloride 0.9% bolus 1,000 mL 1,000 mL (1,000 mLs Intravenous New Bag 6/22/23 1545)   insulin regular injection 6  Units 0.06 mL (6 Units Subcutaneous Given 6/22/23 1545)   insulin regular injection 6 Units 0.06 mL (6 Units Intravenous Given 6/22/23 1546)       New Prescriptions    ONDANSETRON (ZOFRAN) 4 MG TABLET    Take 1 tablet (4 mg total) by mouth every 6 (six) hours as needed for Nausea.        Follow-up Information       PCP.                                 Scribe Attestation:   Scribe #1: I performed the above scribed service and the documentation accurately describes the services I performed. I attest to the accuracy of the note.     Attending:   Physician Attestation Statement for Scribe #1: I, Gen Lo Jr., MD, personally performed the services described in this documentation, as scribed by Lydia Awan, in my presence, and it is both accurate and complete.           Clinical Impression       ICD-10-CM ICD-9-CM   1. Viral gastroenteritis  A08.4 008.8   2. Nausea & vomiting  R11.2 787.01   3. Hyperglycemia  R73.9 790.29       Disposition:   Disposition: Discharged  Condition: Stable       Gen Lo Jr., MD  06/22/23 [id number]

## 2023-09-04 ENCOUNTER — HOSPITAL ENCOUNTER (EMERGENCY)
Facility: HOSPITAL | Age: 68
Discharge: HOME | End: 2023-09-04
Attending: EMERGENCY MEDICINE
Payer: MEDICARE

## 2023-09-04 VITALS
OXYGEN SATURATION: 98 % | DIASTOLIC BLOOD PRESSURE: 79 MMHG | HEART RATE: 72 BPM | RESPIRATION RATE: 20 BRPM | HEIGHT: 65 IN | BODY MASS INDEX: 23.32 KG/M2 | WEIGHT: 140 LBS | SYSTOLIC BLOOD PRESSURE: 148 MMHG | TEMPERATURE: 98 F

## 2023-09-04 DIAGNOSIS — R11.2 NAUSEA AND VOMITING, UNSPECIFIED VOMITING TYPE: ICD-10-CM

## 2023-09-04 DIAGNOSIS — R73.9 HYPERGLYCEMIA: Primary | ICD-10-CM

## 2023-09-04 LAB
ALBUMIN SERPL BCP-MCNC: 3.8 G/DL (ref 3.5–5.2)
ALLENS TEST: ABNORMAL
ALP SERPL-CCNC: 85 U/L (ref 55–135)
ALT SERPL W/O P-5'-P-CCNC: 13 U/L (ref 10–44)
ANION GAP SERPL CALC-SCNC: 6 MMOL/L (ref 8–16)
AST SERPL-CCNC: 13 U/L (ref 10–40)
B-OH-BUTYR BLD STRIP-SCNC: 0.1 MMOL/L (ref 0–0.5)
BACTERIA #/AREA URNS HPF: ABNORMAL /HPF
BASOPHILS # BLD AUTO: 0.03 K/UL (ref 0–0.2)
BASOPHILS NFR BLD: 0.7 % (ref 0–1.9)
BILIRUB SERPL-MCNC: 0.5 MG/DL (ref 0.1–1)
BILIRUB UR QL STRIP: NEGATIVE
BUN SERPL-MCNC: 20 MG/DL (ref 8–23)
CALCIUM SERPL-MCNC: 10.1 MG/DL (ref 8.7–10.5)
CHLORIDE SERPL-SCNC: 96 MMOL/L (ref 95–110)
CLARITY UR: CLEAR
CO2 SERPL-SCNC: 28 MMOL/L (ref 23–29)
COLOR UR: COLORLESS
CREAT SERPL-MCNC: 1.5 MG/DL (ref 0.5–1.4)
DIFFERENTIAL METHOD: ABNORMAL
EOSINOPHIL # BLD AUTO: 0.2 K/UL (ref 0–0.5)
EOSINOPHIL NFR BLD: 3.9 % (ref 0–8)
ERYTHROCYTE [DISTWIDTH] IN BLOOD BY AUTOMATED COUNT: 11.4 % (ref 11.5–14.5)
EST. GFR  (NO RACE VARIABLE): 38 ML/MIN/1.73 M^2
FIO2: 21 %
GLUCOSE SERPL-MCNC: 608 MG/DL (ref 70–110)
GLUCOSE UR QL STRIP: ABNORMAL
HCT VFR BLD AUTO: 34 % (ref 37–48.5)
HGB BLD-MCNC: 12.1 G/DL (ref 12–16)
HGB UR QL STRIP: NEGATIVE
IMM GRANULOCYTES # BLD AUTO: 0.02 K/UL (ref 0–0.04)
IMM GRANULOCYTES NFR BLD AUTO: 0.4 % (ref 0–0.5)
KETONES UR QL STRIP: NEGATIVE
LEUKOCYTE ESTERASE UR QL STRIP: NEGATIVE
LIPASE SERPL-CCNC: 30 U/L (ref 4–60)
LYMPHOCYTES # BLD AUTO: 1.7 K/UL (ref 1–4.8)
LYMPHOCYTES NFR BLD: 37.4 % (ref 18–48)
MCH RBC QN AUTO: 30.3 PG (ref 27–31)
MCHC RBC AUTO-ENTMCNC: 35.6 G/DL (ref 32–36)
MCV RBC AUTO: 85 FL (ref 82–98)
MICROSCOPIC COMMENT: ABNORMAL
MONOCYTES # BLD AUTO: 0.3 K/UL (ref 0.3–1)
MONOCYTES NFR BLD: 7.2 % (ref 4–15)
NEUTROPHILS # BLD AUTO: 2.3 K/UL (ref 1.8–7.7)
NEUTROPHILS NFR BLD: 50.4 % (ref 38–73)
NITRITE UR QL STRIP: NEGATIVE
NRBC BLD-RTO: 0 /100 WBC
PCO2 BLDA: 61.3 MMHG (ref 35–45)
PH SMN: 7.31 [PH] (ref 7.35–7.45)
PH UR STRIP: 5 [PH] (ref 5–8)
PLATELET # BLD AUTO: ABNORMAL K/UL (ref 150–450)
PLATELET BLD QL SMEAR: ABNORMAL
PMV BLD AUTO: 11.1 FL (ref 9.2–12.9)
PO2 BLDA: 16.2 MMHG (ref 40–60)
POC BASE DEFICIT: 2.8 MMOL/L (ref -2–2)
POC HCO3: 30.8 MMOL/L (ref 24–28)
POC PERFORMED BY: ABNORMAL
POC SATURATED O2: 20 % (ref 95–100)
POCT GLUCOSE: 474 MG/DL (ref 70–110)
POCT GLUCOSE: >500 MG/DL (ref 70–110)
POTASSIUM SERPL-SCNC: 4.2 MMOL/L (ref 3.5–5.1)
PROT SERPL-MCNC: 7.1 G/DL (ref 6–8.4)
PROT UR QL STRIP: NEGATIVE
RBC # BLD AUTO: 4 M/UL (ref 4–5.4)
RBC #/AREA URNS HPF: 0 /HPF (ref 0–4)
SODIUM SERPL-SCNC: 130 MMOL/L (ref 136–145)
SP GR UR STRIP: 1.02 (ref 1–1.03)
SPECIMEN SOURCE: ABNORMAL
SQUAMOUS #/AREA URNS HPF: 0 /HPF
TROPONIN I SERPL DL<=0.01 NG/ML-MCNC: 0.02 NG/ML (ref 0–0.03)
URN SPEC COLLECT METH UR: ABNORMAL
UROBILINOGEN UR STRIP-ACNC: NEGATIVE EU/DL
WBC # BLD AUTO: 4.57 K/UL (ref 3.9–12.7)
WBC #/AREA URNS HPF: 1 /HPF (ref 0–5)
YEAST URNS QL MICRO: ABNORMAL

## 2023-09-04 PROCEDURE — 83690 ASSAY OF LIPASE: CPT | Performed by: EMERGENCY MEDICINE

## 2023-09-04 PROCEDURE — 96361 HYDRATE IV INFUSION ADD-ON: CPT

## 2023-09-04 PROCEDURE — 84484 ASSAY OF TROPONIN QUANT: CPT | Performed by: EMERGENCY MEDICINE

## 2023-09-04 PROCEDURE — 82010 KETONE BODYS QUAN: CPT | Performed by: EMERGENCY MEDICINE

## 2023-09-04 PROCEDURE — 80053 COMPREHEN METABOLIC PANEL: CPT | Performed by: EMERGENCY MEDICINE

## 2023-09-04 PROCEDURE — 82962 GLUCOSE BLOOD TEST: CPT | Mod: 91

## 2023-09-04 PROCEDURE — 25000003 PHARM REV CODE 250: Performed by: EMERGENCY MEDICINE

## 2023-09-04 PROCEDURE — 99284 EMERGENCY DEPT VISIT MOD MDM: CPT | Mod: 25

## 2023-09-04 PROCEDURE — 63600175 PHARM REV CODE 636 W HCPCS: Performed by: EMERGENCY MEDICINE

## 2023-09-04 PROCEDURE — 96374 THER/PROPH/DIAG INJ IV PUSH: CPT

## 2023-09-04 PROCEDURE — 81000 URINALYSIS NONAUTO W/SCOPE: CPT | Performed by: EMERGENCY MEDICINE

## 2023-09-04 PROCEDURE — 85025 COMPLETE CBC W/AUTO DIFF WBC: CPT | Performed by: EMERGENCY MEDICINE

## 2023-09-04 RX ORDER — SODIUM CHLORIDE 9 MG/ML
1000 INJECTION, SOLUTION INTRAVENOUS
Status: COMPLETED | OUTPATIENT
Start: 2023-09-04 | End: 2023-09-04

## 2023-09-04 RX ADMIN — SODIUM CHLORIDE 1000 ML: 9 INJECTION, SOLUTION INTRAVENOUS at 06:09

## 2023-09-04 RX ADMIN — INSULIN HUMAN 6 UNITS: 100 INJECTION, SOLUTION PARENTERAL at 06:09

## 2023-09-04 NOTE — ED PROVIDER NOTES
Encounter Date: 9/4/2023       History     Chief Complaint   Patient presents with    Vomiting     Pt had a sudden episode of vomiting, diaphoresis and near syncope. Pt reports she was unable to take her insulin today due to the needle not working. Pt has glucose of >500 in triage.      67-year-old past medical history of type 2 diabetes on lispro 40 units once per day, CAD, anxiety, GERD, hyperlipidemia, hypertension, NATASHA presenting with altered mental status and elevated glucose.  As per patient and history additionally obtain by bedside.  Patient mentions not taking her lispro earlier today after a meal.  While at home witnessed type episode of altered mental status associated with nausea vomiting patient minute responsive episode lasting for 15 or 20 minutes.  On arrival close over 500.  Patient mentions being noncompliant today with medication mentions originally from [place] and visiting family today.  Patient denies any new onset numbness, tingling, weakness, chest pain, fevers, diarrhea or abdominal pain.  Patient mentions having prior ED occasions for similar complaints.  Mentions drinking multiple Dr. Peppers today as well.        Review of patient's allergies indicates:  No Known Allergies  Past Medical History:   Diagnosis Date    Allergic rhinitis, cause unspecified     Anxiety     CAD (coronary artery disease)     S/P MI in 2002; followed by Dr. Blevins, cardiologist    Depression     Diabetes mellitus, type 2     External hemorrhoid     GERD (gastroesophageal reflux disease)     Hyperlipidemia     Hypertension     Keloid     Multinodular goiter     Neck arthritis 07/28/2016    NATASHA (obstructive sleep apnea)     Postmenopausal     No history of abnormal pap smear     Past Surgical History:   Procedure Laterality Date    APPENDECTOMY  2011    BREAST CYST EXCISION Left     over 20 yrs ago    CARDIAC CATHETERIZATION      COLONOSCOPY N/A 3/1/2017    Procedure: COLONOSCOPY;  Surgeon: Chase Moffett III,  MD;  Location: [facility];  Service: Endoscopy;  Laterality: N/A;    HYSTERECTOMY      in her 50's    Left breast lumpectomy      NO cancer    OOPHORECTOMY      TLH/BSO      Due to noncancerous reasons     Family History   Problem Relation Age of Onset    Arthritis Mother     Lupus Sister     Diabetes Brother     Hypertension Brother     Diabetes Paternal Aunt     Cancer Maternal Cousin     Breast cancer Maternal Cousin     Cancer Cousin     Heart disease Sister     Diabetes Sister     Hypertension Sister     Diabetes Sister     Hypertension Sister     Diabetes Brother     Hypertension Brother     Cancer Brother         Pancreatic and lung cancer (Smoker)    Hypertension Son     Stroke Neg Hx      Social History     Tobacco Use    Smoking status: Former     Current packs/day: 0.00     Average packs/day: 0.1 packs/day for 0.2 years     Types: Cigarettes     Start date: 1993     Quit date: 1993     Years since quittin.1    Smokeless tobacco: Never   Substance Use Topics    Alcohol use: No     Alcohol/week: 0.0 standard drinks of alcohol    Drug use: No     Review of Systems    Physical Exam     Initial Vitals [23 1646]   BP Pulse Resp Temp SpO2   (!) 157/74 (!) 54 18 97.8 °F (36.6 °C) 96 %      MAP       --         Physical Exam    Constitutional: She appears well-developed and well-nourished. She is not diaphoretic. No distress.   HENT:   Head: Normocephalic and atraumatic.   Eyes: Conjunctivae and EOM are normal. Pupils are equal, round, and reactive to light. Right eye exhibits no discharge. Left eye exhibits no discharge. No scleral icterus.   Neck: Neck supple.   Normal range of motion.  Cardiovascular:  Normal rate and regular rhythm.     Exam reveals no friction rub.       No murmur heard.  Pulmonary/Chest: Breath sounds normal. No respiratory distress. She has no wheezes. She has no rales.   Abdominal: Abdomen is soft. Bowel sounds are normal. She exhibits no distension. There is no  abdominal tenderness. There is no rebound and no guarding.   Musculoskeletal:         General: Normal range of motion.      Cervical back: Normal range of motion and neck supple.     Neurological: She is alert and oriented to person, place, and time. She has normal strength. No cranial nerve deficit or sensory deficit. GCS score is 15. GCS eye subscore is 4. GCS verbal subscore is 5. GCS motor subscore is 6.   Skin: Skin is warm and dry. No erythema. No pallor.   Psychiatric: She has a normal mood and affect. Her behavior is normal. Judgment and thought content normal.         ED Course   Procedures  Labs Reviewed   CBC W/ AUTO DIFFERENTIAL - Abnormal; Notable for the following components:       Result Value    Hematocrit 34.0 (*)     RDW 11.4 (*)     Platelet Estimate Clumped (*)     All other components within normal limits   COMPREHENSIVE METABOLIC PANEL - Abnormal; Notable for the following components:    Sodium 130 (*)     Glucose 608 (*)     Creatinine 1.5 (*)     eGFR 38 (*)     Anion Gap 6 (*)     All other components within normal limits    Narrative:     GLU critical result(s) called and verbal readback obtained from Merritt Velasquez RN.  by [facility] 09/04/2023 18:07   URINALYSIS, REFLEX TO URINE CULTURE - Abnormal; Notable for the following components:    Color, UA Colorless (*)     Glucose, UA 4+ (*)     All other components within normal limits    Narrative:     Specimen Source->Urine   URINALYSIS MICROSCOPIC - Abnormal; Notable for the following components:    Yeast, UA Rare (*)     All other components within normal limits    Narrative:     Specimen Source->Urine   POCT GLUCOSE - Abnormal; Notable for the following components:    POCT Glucose >500 (*)     All other components within normal limits   POCT GLUCOSE - Abnormal; Notable for the following components:    POCT Glucose 474 (*)     All other components within normal limits   BETA - HYDROXYBUTYRATE, SERUM   LIPASE   TROPONIN I          Imaging Results     None          Medications   0.9%  NaCl infusion (0 mLs Intravenous Stopped 9/4/23 1908)   insulin regular injection 6 Units 0.06 mL (6 Units Intravenous Given 9/4/23 1820)     Medical Decision Making  67-year-old past medical history of type 2 diabetes presenting with altered mental status now neurologically at baseline additionally with elevated glucose over 500.  DX includes concern for DKA, hyperglycemia, renal failure, gastritis, gastroenteritis, dehydration, ULYSSES, electrolyte derangement, ACS, med noncompliance.  Plan is to obtain CBC, CMP,  troponin, VBG, fluids, hydroxybutyrate reassess.    Was noted 600s will give IV insulin, no noted anion gap patient mentions feeling well while in ED awaiting continue to reassess trend glucose.        Risk  OTC drugs.  Prescription drug management.               ED Course as of 09/04/23 1922   Mon Sep 04, 2023   1920 Patient's glucose downward trending following fluids and insulin injection.  Patient is awake alert A&O x4 no evidence of DKA as per current workup.  Glucose elevation likely secondary to medication noncompliance in take of soda.  Discussed with patient the necessity to remain compliant with insulin patient had patient's family member at understanding of plan no indication of abrasion hospitalization at this time.  Will DC home with follow-up instructions and return precautions patient very well has no further questions. [DC]      ED Course User Index  [DC] Alba Vasquez Jr., MD                    Clinical Impression:   Final diagnoses:  [R73.9] Hyperglycemia (Primary)  [R11.2] Nausea and vomiting, unspecified vomiting type        ED Disposition Condition    Discharge Stable          ED Prescriptions    None       Follow-up Information       Follow up With Specialties Details Why Contact [facility] - Emergency Dept Emergency Medicine  If symptoms worsen [address] [facility] [address] [id number] [phone]             Alba Vasquez  MD Polly  09/04/23 [id number]

## 2023-09-05 NOTE — ED NOTES
Pt care assumed. Report received by NALDO Ware. Pt lying in stretcher in low and locked position and side rails raised x2. Call light, pt's belongings, and bedside table within pt's reach. Pt on continuous cardiac monitoring, pulse oximetry, and BP cycling every 60 minutes. Pt in NAD and verbalized no needs at this time. Family member x1 at bedside.

## 2023-10-19 ENCOUNTER — HOSPITAL ENCOUNTER (EMERGENCY)
Facility: HOSPITAL | Age: 68
Discharge: HOME | End: 2023-10-20
Attending: EMERGENCY MEDICINE
Payer: MEDICARE

## 2023-10-19 VITALS
TEMPERATURE: 98 F | BODY MASS INDEX: 24.01 KG/M2 | DIASTOLIC BLOOD PRESSURE: 61 MMHG | RESPIRATION RATE: 19 BRPM | SYSTOLIC BLOOD PRESSURE: 133 MMHG | HEART RATE: 84 BPM | WEIGHT: 144.31 LBS | OXYGEN SATURATION: 99 %

## 2023-10-19 DIAGNOSIS — N18.9 CHRONIC KIDNEY DISEASE, UNSPECIFIED CKD STAGE: ICD-10-CM

## 2023-10-19 DIAGNOSIS — I10 HYPERTENSION, UNSPECIFIED TYPE: ICD-10-CM

## 2023-10-19 DIAGNOSIS — R11.2 NAUSEA AND VOMITING, UNSPECIFIED VOMITING TYPE: ICD-10-CM

## 2023-10-19 DIAGNOSIS — I16.0 HYPERTENSIVE URGENCY: ICD-10-CM

## 2023-10-19 DIAGNOSIS — E11.65 HYPERGLYCEMIA DUE TO DIABETES MELLITUS: ICD-10-CM

## 2023-10-19 DIAGNOSIS — K59.00 CONSTIPATION, UNSPECIFIED CONSTIPATION TYPE: Primary | ICD-10-CM

## 2023-10-19 LAB
ALBUMIN SERPL BCP-MCNC: 4 G/DL (ref 3.5–5.2)
ALLENS TEST: ABNORMAL
ALP SERPL-CCNC: 103 U/L (ref 55–135)
ALT SERPL W/O P-5'-P-CCNC: 14 U/L (ref 10–44)
ANION GAP SERPL CALC-SCNC: 13 MMOL/L (ref 8–16)
ANION GAP SERPL CALC-SCNC: 19 MMOL/L (ref 8–16)
AST SERPL-CCNC: 14 U/L (ref 10–40)
B-OH-BUTYR BLD STRIP-SCNC: 2.5 MMOL/L (ref 0–0.5)
BACTERIA #/AREA URNS HPF: NORMAL /HPF
BASOPHILS # BLD AUTO: 0.01 K/UL (ref 0–0.2)
BASOPHILS NFR BLD: 0.1 % (ref 0–1.9)
BILIRUB SERPL-MCNC: 0.8 MG/DL (ref 0.1–1)
BILIRUB UR QL STRIP: NEGATIVE
BUN SERPL-MCNC: 27 MG/DL (ref 8–23)
BUN SERPL-MCNC: 30 MG/DL (ref 8–23)
CALCIUM SERPL-MCNC: 10.4 MG/DL (ref 8.7–10.5)
CALCIUM SERPL-MCNC: 9.7 MG/DL (ref 8.7–10.5)
CHLORIDE SERPL-SCNC: 100 MMOL/L (ref 95–110)
CHLORIDE SERPL-SCNC: 104 MMOL/L (ref 95–110)
CLARITY UR: CLEAR
CO2 SERPL-SCNC: 16 MMOL/L (ref 23–29)
CO2 SERPL-SCNC: 20 MMOL/L (ref 23–29)
COLOR UR: YELLOW
CREAT SERPL-MCNC: 1.5 MG/DL (ref 0.5–1.4)
CREAT SERPL-MCNC: 1.6 MG/DL (ref 0.5–1.4)
DELSYS: ABNORMAL
DIFFERENTIAL METHOD: ABNORMAL
EOSINOPHIL # BLD AUTO: 0 K/UL (ref 0–0.5)
EOSINOPHIL NFR BLD: 0 % (ref 0–8)
ERYTHROCYTE [DISTWIDTH] IN BLOOD BY AUTOMATED COUNT: 11.5 % (ref 11.5–14.5)
EST. GFR  (NO RACE VARIABLE): 35 ML/MIN/1.73 M^2
EST. GFR  (NO RACE VARIABLE): 37 ML/MIN/1.73 M^2
FIO2: 21
GLUCOSE SERPL-MCNC: 233 MG/DL (ref 70–110)
GLUCOSE SERPL-MCNC: 335 MG/DL (ref 70–110)
GLUCOSE UR QL STRIP: ABNORMAL
HCO3 UR-SCNC: 18.3 MMOL/L (ref 24–28)
HCT VFR BLD AUTO: 43 % (ref 37–48.5)
HGB BLD-MCNC: 15 G/DL (ref 12–16)
HGB UR QL STRIP: NEGATIVE
HYALINE CASTS #/AREA URNS LPF: 0 /LPF
IMM GRANULOCYTES # BLD AUTO: 0.02 K/UL (ref 0–0.04)
IMM GRANULOCYTES NFR BLD AUTO: 0.3 % (ref 0–0.5)
KETONES UR QL STRIP: ABNORMAL
LEUKOCYTE ESTERASE UR QL STRIP: ABNORMAL
LIPASE SERPL-CCNC: 9 U/L (ref 4–60)
LYMPHOCYTES # BLD AUTO: 1.2 K/UL (ref 1–4.8)
LYMPHOCYTES NFR BLD: 18.4 % (ref 18–48)
MCH RBC QN AUTO: 29.9 PG (ref 27–31)
MCHC RBC AUTO-ENTMCNC: 34.9 G/DL (ref 32–36)
MCV RBC AUTO: 86 FL (ref 82–98)
MICROSCOPIC COMMENT: NORMAL
MODE: ABNORMAL
MONOCYTES # BLD AUTO: 0.2 K/UL (ref 0.3–1)
MONOCYTES NFR BLD: 2.7 % (ref 4–15)
NEUTROPHILS # BLD AUTO: 5.3 K/UL (ref 1.8–7.7)
NEUTROPHILS NFR BLD: 78.5 % (ref 38–73)
NITRITE UR QL STRIP: NEGATIVE
NRBC BLD-RTO: 0 /100 WBC
PCO2 BLDA: 33.1 MMHG (ref 35–45)
PH SMN: 7.35 [PH] (ref 7.35–7.45)
PH UR STRIP: 6 [PH] (ref 5–8)
PLATELET # BLD AUTO: 472 K/UL (ref 150–450)
PMV BLD AUTO: 10.1 FL (ref 9.2–12.9)
PO2 BLDA: 111 MMHG (ref 40–60)
POC BE: -7 MMOL/L
POC SATURATED O2: 98 % (ref 95–100)
POCT GLUCOSE: 216 MG/DL (ref 70–110)
POCT GLUCOSE: 264 MG/DL (ref 70–110)
POTASSIUM SERPL-SCNC: 4.1 MMOL/L (ref 3.5–5.1)
POTASSIUM SERPL-SCNC: 4.5 MMOL/L (ref 3.5–5.1)
PROT SERPL-MCNC: 8.5 G/DL (ref 6–8.4)
PROT UR QL STRIP: ABNORMAL
RBC # BLD AUTO: 5.02 M/UL (ref 4–5.4)
RBC #/AREA URNS HPF: 1 /HPF (ref 0–4)
SAMPLE: ABNORMAL
SITE: ABNORMAL
SODIUM SERPL-SCNC: 135 MMOL/L (ref 136–145)
SODIUM SERPL-SCNC: 137 MMOL/L (ref 136–145)
SP GR UR STRIP: 1.02 (ref 1–1.03)
SQUAMOUS #/AREA URNS HPF: 0 /HPF
URN SPEC COLLECT METH UR: ABNORMAL
UROBILINOGEN UR STRIP-ACNC: NEGATIVE EU/DL
WBC # BLD AUTO: 6.73 K/UL (ref 3.9–12.7)
WBC #/AREA URNS HPF: 0 /HPF (ref 0–5)
YEAST URNS QL MICRO: NORMAL

## 2023-10-19 PROCEDURE — 96375 TX/PRO/DX INJ NEW DRUG ADDON: CPT

## 2023-10-19 PROCEDURE — 82803 BLOOD GASES ANY COMBINATION: CPT

## 2023-10-19 PROCEDURE — 25000003 PHARM REV CODE 250: Performed by: REGISTERED NURSE

## 2023-10-19 PROCEDURE — A9698 NON-RAD CONTRAST MATERIALNOC: HCPCS | Performed by: EMERGENCY MEDICINE

## 2023-10-19 PROCEDURE — 80048 BASIC METABOLIC PNL TOTAL CA: CPT | Mod: XB | Performed by: EMERGENCY MEDICINE

## 2023-10-19 PROCEDURE — 81000 URINALYSIS NONAUTO W/SCOPE: CPT | Performed by: REGISTERED NURSE

## 2023-10-19 PROCEDURE — 83690 ASSAY OF LIPASE: CPT | Performed by: REGISTERED NURSE

## 2023-10-19 PROCEDURE — 25000003 PHARM REV CODE 250: Performed by: EMERGENCY MEDICINE

## 2023-10-19 PROCEDURE — 63600175 PHARM REV CODE 636 W HCPCS: Performed by: EMERGENCY MEDICINE

## 2023-10-19 PROCEDURE — 80053 COMPREHEN METABOLIC PANEL: CPT | Performed by: REGISTERED NURSE

## 2023-10-19 PROCEDURE — 85025 COMPLETE CBC W/AUTO DIFF WBC: CPT | Performed by: REGISTERED NURSE

## 2023-10-19 PROCEDURE — 96361 HYDRATE IV INFUSION ADD-ON: CPT

## 2023-10-19 PROCEDURE — 25500020 PHARM REV CODE 255: Performed by: EMERGENCY MEDICINE

## 2023-10-19 PROCEDURE — 99285 EMERGENCY DEPT VISIT HI MDM: CPT | Mod: 25

## 2023-10-19 PROCEDURE — 63600175 PHARM REV CODE 636 W HCPCS: Performed by: REGISTERED NURSE

## 2023-10-19 PROCEDURE — 82010 KETONE BODYS QUAN: CPT | Performed by: EMERGENCY MEDICINE

## 2023-10-19 PROCEDURE — 82962 GLUCOSE BLOOD TEST: CPT

## 2023-10-19 PROCEDURE — 99900035 HC TECH TIME PER 15 MIN (STAT)

## 2023-10-19 PROCEDURE — 96374 THER/PROPH/DIAG INJ IV PUSH: CPT

## 2023-10-19 RX ORDER — ONDANSETRON 4 MG/1
4 TABLET, ORALLY DISINTEGRATING ORAL EVERY 8 HOURS PRN
Qty: 11 TABLET | Refills: 0 | Status: SHIPPED | OUTPATIENT
Start: 2023-10-19 | End: 2023-10-23

## 2023-10-19 RX ORDER — PANTOPRAZOLE SODIUM 40 MG/1
40 TABLET, DELAYED RELEASE ORAL DAILY
Qty: 30 TABLET | Refills: 0 | Status: SHIPPED | OUTPATIENT
Start: 2023-10-19 | End: 2023-11-18

## 2023-10-19 RX ORDER — ONDANSETRON 2 MG/ML
4 INJECTION INTRAMUSCULAR; INTRAVENOUS
Status: COMPLETED | OUTPATIENT
Start: 2023-10-19 | End: 2023-10-19

## 2023-10-19 RX ORDER — HYDRALAZINE HYDROCHLORIDE 25 MG/1
50 TABLET, FILM COATED ORAL
Status: COMPLETED | OUTPATIENT
Start: 2023-10-19 | End: 2023-10-19

## 2023-10-19 RX ORDER — LABETALOL HYDROCHLORIDE 5 MG/ML
10 INJECTION, SOLUTION INTRAVENOUS
Status: COMPLETED | OUTPATIENT
Start: 2023-10-19 | End: 2023-10-19

## 2023-10-19 RX ORDER — LACTULOSE 10 G/15ML
20 SOLUTION ORAL DAILY PRN
Qty: 210 ML | Refills: 0 | Status: SHIPPED | OUTPATIENT
Start: 2023-10-19 | End: 2023-10-26

## 2023-10-19 RX ORDER — MORPHINE SULFATE 4 MG/ML
4 INJECTION, SOLUTION INTRAMUSCULAR; INTRAVENOUS
Status: COMPLETED | OUTPATIENT
Start: 2023-10-19 | End: 2023-10-19

## 2023-10-19 RX ORDER — ONDANSETRON 4 MG/1
4 TABLET, FILM COATED ORAL EVERY 8 HOURS PRN
Qty: 12 TABLET | Refills: 0 | Status: SHIPPED | OUTPATIENT
Start: 2023-10-19 | End: 2023-10-19 | Stop reason: SDUPTHER

## 2023-10-19 RX ADMIN — MORPHINE SULFATE 4 MG: 4 INJECTION INTRAVENOUS at 07:10

## 2023-10-19 RX ADMIN — INSULIN HUMAN 6 UNITS: 100 INJECTION, SOLUTION PARENTERAL at 08:10

## 2023-10-19 RX ADMIN — LABETALOL HYDROCHLORIDE 10 MG: 5 INJECTION INTRAVENOUS at 08:10

## 2023-10-19 RX ADMIN — ONDANSETRON 4 MG: 2 INJECTION INTRAMUSCULAR; INTRAVENOUS at 04:10

## 2023-10-19 RX ADMIN — SODIUM CHLORIDE 1000 ML: 9 INJECTION, SOLUTION INTRAVENOUS at 04:10

## 2023-10-19 RX ADMIN — HYDRALAZINE HYDROCHLORIDE 50 MG: 25 TABLET, FILM COATED ORAL at 07:10

## 2023-10-19 RX ADMIN — IOHEXOL 1000 ML: 9 SOLUTION ORAL at 06:10

## 2023-10-19 NOTE — ED PROVIDER NOTES
SCRIBE #1 NOTE: I, Tarah Joyce, am scribing for, and in the presence of, Trent Hernandez MD. I have scribed HPI, ROS, PEx    SCRIBE #2 NOTE: I, Mohsen Brown, am scribing for, and in the presence of,  Darrell Breaux MD. I have scribed the remaining portions of the note not scribed by Scribe #1.      History     Chief Complaint   Patient presents with    Abdominal Pain     Pt. C/o feeling weak, vomiting, and constipation since Monday. Pt. States her abd pain is defuse over her abd      Review of patient's allergies indicates:  No Known Allergies      History of Present Illness     HPI    10/19/2023, 5:52 PM  History obtained from the patient      History of Present Illness: Lydia Danielson is a 68 y.o. female patient with a PMHx of HTN, CAD, DM who presents to the Emergency Department for evaluation of abdominal pain which onset 2 weeks ago. Pt state she has not have bowel movement in 2 weeks and is experiencing n/v Symptoms are constant and moderate in severity. No mitigating or exacerbating factors reported. Associated sxs include constipation. Patient denies any sob, cp, weakness, fever, back pain, and all other sxs at this time. No prior Tx reported. No further complaints or concerns at this time.       Arrival mode: Personal vehicle        PCP: Gita Govea        Past Medical History:  Past Medical History:   Diagnosis Date    Allergic rhinitis, cause unspecified     Anxiety     CAD (coronary artery disease)     S/P MI in 2002; followed by Dr. Blevins, cardiologist    Depression     Diabetes mellitus, type 2     External hemorrhoid     GERD (gastroesophageal reflux disease)     Hyperlipidemia     Hypertension     Keloid     Multinodular goiter     Neck arthritis 07/28/2016    NATASHA (obstructive sleep apnea)     Postmenopausal     No history of abnormal pap smear       Past Surgical History:  Past Surgical History:   Procedure Laterality Date    APPENDECTOMY  2011    BREAST CYST EXCISION Left     over 20  yrs ago    CARDIAC CATHETERIZATION      COLONOSCOPY N/A 3/1/2017    Procedure: COLONOSCOPY;  Surgeon: Chase Moffett III, MD;  Location: [facility];  Service: Endoscopy;  Laterality: N/A;    HYSTERECTOMY      in her 50's    Left breast lumpectomy      NO cancer    OOPHORECTOMY      TLH/BSO      Due to noncancerous reasons         Family History:  Family History   Problem Relation Age of Onset    Arthritis Mother     Lupus Sister     Diabetes Brother     Hypertension Brother     Diabetes Paternal Aunt     Cancer Maternal Cousin     Breast cancer Maternal Cousin     Cancer Cousin     Heart disease Sister     Diabetes Sister     Hypertension Sister     Diabetes Sister     Hypertension Sister     Diabetes Brother     Hypertension Brother     Cancer Brother         Pancreatic and lung cancer (Smoker)    Hypertension Son     Stroke Neg Hx        Social History:  Social History     Tobacco Use    Smoking status: Former     Current packs/day: 0.00     Average packs/day: 0.1 packs/day for 0.2 years     Types: Cigarettes     Start date: 1993     Quit date: 1993     Years since quittin.3    Smokeless tobacco: Never   Substance and Sexual Activity    Alcohol use: No     Alcohol/week: 0.0 standard drinks of alcohol    Drug use: No    Sexual activity: Not Currently     Partners: Male     Birth control/protection: Surgical, Post-menopausal        Review of Systems     Review of Systems   Constitutional:  Negative for fever.   HENT:  Negative for sore throat.    Respiratory:  Negative for shortness of breath.    Cardiovascular:  Negative for chest pain.   Gastrointestinal:  Positive for abdominal pain, constipation, nausea and vomiting.   Genitourinary:  Negative for dysuria.   Musculoskeletal:  Negative for back pain.   Skin:  Negative for rash.   Neurological:  Negative for weakness.   Hematological:  Does not bruise/bleed easily.   All other systems reviewed and are negative.       Physical Exam     Initial Vitals  [10/19/23 1532]   BP Pulse Resp Temp SpO2   (!) 184/104 101 18 98.1 °F (36.7 °C) 99 %      MAP       --          Physical Exam  Nursing Notes and Vital Signs Reviewed.  Constitutional: Patient is in no apparent distress. Well-developed and well-nourished.  Head: Atraumatic. Normocephalic.  Eyes: PERRL. EOM intact. Conjunctivae are not pale. No scleral icterus.  ENT: Mucous membranes are moist. Oropharynx is clear and symmetric.    Neck: Supple. Full ROM. No lymphadenopathy.  Cardiovascular: Regular rate. Regular rhythm. No murmurs, rubs, or gallops. Distal pulses are 2+ and symmetric.  Pulmonary/Chest: No respiratory distress. Clear to auscultation bilaterally. No wheezing or rales.  Abdominal: Soft and non-distended.  There is some paraumbilical  tenderness.  No rebound, guarding, or rigidity. Good bowel sounds.  Genitourinary: No CVA tenderness  Musculoskeletal: Moves all extremities. No obvious deformities. No edema. No calf tenderness.  Skin: Warm and dry.  Neurological:  Alert, awake, and appropriate.  Normal speech.  No acute focal neurological deficits are appreciated.  Psychiatric: Normal affect. Good eye contact. Appropriate in content.     ED Course   Critical Care    Date/Time: 10/19/2023 11:19 PM    Performed by: Darrell Breaux MD  Authorized by: Darrell Breaux MD  Direct patient critical care time: 20 minutes  Additional history critical care time: 10 minutes  Ordering / reviewing critical care time: 5 minutes  Documentation critical care time: 5 minutes  Consulting other physicians critical care time: 5 minutes  Total critical care time (exclusive of procedural time) : 45 minutes  Critical care time was exclusive of separately billable procedures and treating other patients and teaching time.  Critical care was necessary to treat or prevent imminent or life-threatening deterioration of the following conditions: hypertensive urgency and hyperglycemia requiring IV medication.  Critical care was time  spent personally by me on the following activities: blood draw for specimens, development of treatment plan with patient or surrogate, interpretation of cardiac output measurements, evaluation of patient's response to treatment, examination of patient, obtaining history from patient or surrogate, ordering and performing treatments and interventions, ordering and review of laboratory studies, ordering and review of radiographic studies, pulse oximetry, re-evaluation of patient's condition and review of old charts.        ED Vital Signs:  Vitals:    10/19/23 1532 10/19/23 1844 10/19/23 1913 10/19/23 2003   BP: (!) 184/104 (!) 213/106 (!) 224/108 (!) 218/105   Pulse: 101 84     Resp: 18 18 19    Temp: 98.1 °F (36.7 °C)      TempSrc: Oral      SpO2: 99% 99%     Weight: 65.5 kg (144 lb 4.7 oz)       10/19/23 2017 10/19/23 2047 10/19/23 2116 10/19/23 2231   BP: (!) 143/75 117/64 126/70 133/61   Pulse:       Resp:       Temp:       TempSrc:       SpO2:       Weight:           Abnormal Lab Results:  Labs Reviewed   CBC W/ AUTO DIFFERENTIAL - Abnormal; Notable for the following components:       Result Value    Platelets 472 (*)     Mono # 0.2 (*)     Gran % 78.5 (*)     Mono % 2.7 (*)     All other components within normal limits   COMPREHENSIVE METABOLIC PANEL - Abnormal; Notable for the following components:    Sodium 135 (*)     CO2 16 (*)     Glucose 335 (*)     BUN 27 (*)     Creatinine 1.6 (*)     Total Protein 8.5 (*)     eGFR 35 (*)     Anion Gap 19 (*)     All other components within normal limits   URINALYSIS, REFLEX TO URINE CULTURE - Abnormal; Notable for the following components:    Protein, UA 2+ (*)     Glucose, UA 4+ (*)     Ketones, UA 2+ (*)     Leukocytes, UA 1+ (*)     All other components within normal limits    Narrative:     Specimen Source->Urine   BETA - HYDROXYBUTYRATE, SERUM - Abnormal; Notable for the following components:    Beta-Hydroxybutyrate 2.5 (*)     All other components within normal  limits   BASIC METABOLIC PANEL - Abnormal; Notable for the following components:    CO2 20 (*)     Glucose 233 (*)     BUN 30 (*)     Creatinine 1.5 (*)     eGFR 37 (*)     All other components within normal limits   ISTAT PROCEDURE - Abnormal; Notable for the following components:    POC PCO2 33.1 (*)     POC PO2 111 (*)     POC HCO3 18.3 (*)     POC BE -7 (*)     All other components within normal limits   POCT GLUCOSE - Abnormal; Notable for the following components:    POCT Glucose 264 (*)     All other components within normal limits   POCT GLUCOSE - Abnormal; Notable for the following components:    POCT Glucose 216 (*)     All other components within normal limits   LIPASE   URINALYSIS MICROSCOPIC    Narrative:     Specimen Source->Urine        All Lab Results:  Results for orders placed or performed during the hospital encounter of 10/19/23   CBC auto differential   Result Value Ref Range    WBC 6.73 3.90 - 12.70 K/uL    RBC 5.02 4.00 - 5.40 M/uL    Hemoglobin 15.0 12.0 - 16.0 g/dL    Hematocrit 43.0 37.0 - 48.5 %    MCV 86 82 - 98 fL    MCH 29.9 27.0 - 31.0 pg    MCHC 34.9 32.0 - 36.0 g/dL    RDW 11.5 11.5 - 14.5 %    Platelets 472 (H) 150 - 450 K/uL    MPV 10.1 9.2 - 12.9 fL    Immature Granulocytes 0.3 0.0 - 0.5 %    Gran # (ANC) 5.3 1.8 - 7.7 K/uL    Immature Grans (Abs) 0.02 0.00 - 0.04 K/uL    Lymph # 1.2 1.0 - 4.8 K/uL    Mono # 0.2 (L) 0.3 - 1.0 K/uL    Eos # 0.0 0.0 - 0.5 K/uL    Baso # 0.01 0.00 - 0.20 K/uL    nRBC 0 0 /100 WBC    Gran % 78.5 (H) 38.0 - 73.0 %    Lymph % 18.4 18.0 - 48.0 %    Mono % 2.7 (L) 4.0 - 15.0 %    Eosinophil % 0.0 0.0 - 8.0 %    Basophil % 0.1 0.0 - 1.9 %    Differential Method Automated    Comprehensive metabolic panel   Result Value Ref Range    Sodium 135 (L) 136 - 145 mmol/L    Potassium 4.5 3.5 - 5.1 mmol/L    Chloride 100 95 - 110 mmol/L    CO2 16 (L) 23 - 29 mmol/L    Glucose 335 (H) 70 - 110 mg/dL    BUN 27 (H) 8 - 23 mg/dL    Creatinine 1.6 (H) 0.5 - 1.4 mg/dL     Calcium 10.4 8.7 - 10.5 mg/dL    Total Protein 8.5 (H) 6.0 - 8.4 g/dL    Albumin 4.0 3.5 - 5.2 g/dL    Total Bilirubin 0.8 0.1 - 1.0 mg/dL    Alkaline Phosphatase 103 55 - 135 U/L    AST 14 10 - 40 U/L    ALT 14 10 - 44 U/L    eGFR 35 (A) >60 mL/min/1.73 m^2    Anion Gap 19 (H) 8 - 16 mmol/L   Lipase   Result Value Ref Range    Lipase 9 4 - 60 U/L   Urinalysis, Reflex to Urine Culture Urine, Clean Catch    Specimen: Urine   Result Value Ref Range    Specimen UA Urine, Clean Catch     Color, UA Yellow Yellow, Straw, Yolanda    Appearance, UA Clear Clear    pH, UA 6.0 5.0 - 8.0    Specific Gravity, UA 1.025 1.005 - 1.030    Protein, UA 2+ (A) Negative    Glucose, UA 4+ (A) Negative    Ketones, UA 2+ (A) Negative    Bilirubin (UA) Negative Negative    Occult Blood UA Negative Negative    Nitrite, UA Negative Negative    Urobilinogen, UA Negative <2.0 EU/dL    Leukocytes, UA 1+ (A) Negative   Urinalysis Microscopic   Result Value Ref Range    RBC, UA 1 0 - 4 /hpf    WBC, UA 0 0 - 5 /hpf    Bacteria Rare None-Occ /hpf    Yeast, UA None None    Squam Epithel, UA 0 /hpf    Hyaline Casts, UA 0 0-1/lpf /lpf    Microscopic Comment SEE COMMENT    Beta-Hydroxybutyrate, Serum   Result Value Ref Range    Beta-Hydroxybutyrate 2.5 (H) 0.0 - 0.5 mmol/L   Basic metabolic panel   Result Value Ref Range    Sodium 137 136 - 145 mmol/L    Potassium 4.1 3.5 - 5.1 mmol/L    Chloride 104 95 - 110 mmol/L    CO2 20 (L) 23 - 29 mmol/L    Glucose 233 (H) 70 - 110 mg/dL    BUN 30 (H) 8 - 23 mg/dL    Creatinine 1.5 (H) 0.5 - 1.4 mg/dL    Calcium 9.7 8.7 - 10.5 mg/dL    Anion Gap 13 8 - 16 mmol/L    eGFR 37 (A) >60 mL/min/1.73 m^2   ISTAT PROCEDURE   Result Value Ref Range    POC PH 7.352 7.35 - 7.45    POC PCO2 33.1 (L) 35 - 45 mmHg    POC PO2 111 (HH) 40 - 60 mmHg    POC HCO3 18.3 (L) 24 - 28 mmol/L    POC BE -7 (L) -2 to 2 mmol/L    POC SATURATED O2 98 95 - 100 %    Sample VENOUS     Site Other     Allens Test N/A     DelSys Room Air     Mode  SPONT     FiO2 21    POCT glucose   Result Value Ref Range    POCT Glucose 264 (H) 70 - 110 mg/dL   POCT glucose   Result Value Ref Range    POCT Glucose 216 (H) 70 - 110 mg/dL        Imaging Results:  Imaging Results              CT Abdomen Pelvis  Without Contrast (Final result)  Result time 10/19/23 19:16:10      Final result by Naren Martinez MD (10/19/23 19:16:10)                   Impression:      No acute abnormality identified.    Complete findings as above.    All CT scans at this facility are performed  using dose modulation techniques as appropriate to performed exam including the following:  automated exposure control; adjustment of mA and/or kV according to the patients size (this includes techniques or standardized protocols for targeted exams where dose is matched to indication/reason for exam: i.e. extremities or head);  iterative reconstruction technique.      Electronically signed by: Naren Martinez  Date:    10/19/2023  Time:    19:16               Narrative:    EXAMINATION:  CT ABDOMEN PELVIS WITHOUT CONTRAST    CLINICAL HISTORY:  Bowel obstruction suspected;    TECHNIQUE:  Low dose axial images, sagittal and coronal reformations were obtained from the lung bases to the pubic symphysis.  Contrast was not administered.    COMPARISON:  None    FINDINGS:  Heart: Normal in size. No pericardial effusion.    Lung Bases: Well aerated, without consolidation or pleural fluid.    Liver: Normal in size and attenuation, with no focal hepatic lesions.    Gallbladder: No calcified gallstones.    Bile Ducts: No evidence of dilated ducts.    Pancreas: No mass or peripancreatic fat stranding.    Spleen: Unremarkable.    Adrenals: Suspected right adrenal angiomyelolipoma measuring 1.4 cm along the inferior margin.  Left adrenal unremarkable.    Kidneys/ Ureters: No hydronephrosis.  No obstructive uropathy.  No nonobstructive nephrolithiasis.    Bladder: No evidence of wall thickening.    Reproductive organs:  Unremarkable.    GI Tract/Mesentery: No evidence of bowel obstruction or inflammation.  Appendix appears surgically absent.  Possible constipation.    Peritoneal Space: No ascites. No free air.    Retroperitoneum: No significant adenopathy.    Abdominal wall: Skin thickening along the umbilicus possibly related to tiny hernia with internal fat stranding or infection.  Correlation is advised.    Vasculature: No significant atherosclerosis or aneurysm.    Bones: No acute fracture.                                            ED Discussion     7:59 PM: Dr. Hernandez transfers care of patient to Dr. Breaux pending Lab results.     11:20 PM: Reassessed pt at this time. Discussed with pt all pertinent ED information and results. Discussed pt dx and plan of tx. Gave pt all f/u and return to the ED instructions. All questions and concerns were addressed at this time. Pt expresses understanding of information and instructions, and is comfortable with plan to discharge. Pt is stable for discharge.    I discussed with patient and/or family/caretaker that evaluation in the ED does not suggest any emergent or life threatening medical conditions requiring immediate intervention beyond what was provided in the ED, and I believe patient is safe for discharge.  Regardless, an unremarkable evaluation in the ED does not preclude the development or presence of a serious of life threatening condition. As such, patient was instructed to return immediately for any worsening or change in current symptoms.      ED Course as of 10/20/23 0546   Thu Oct 19, 2023   1946 I discussed admission with the patient given her elevated blood pressure, elevated blood sugar with ketones present in her bloodstream.  She does not appear to be in DKA today.  Patient is feeling well at this time.  Patient says she does not wish to be admitted to the hospital for this, we will continue taking your home medications that she says she missed today, and follow up with  her primary care provider.  She understands there is an increased risk of stroke, disability, death, deterioration, debility if these issues go on addressed.  Patient understands this at this time, has capacity to make medical decisions, and is voicing a decision to go home rather than being admitted to the hospital.  Patient is hemodynamically stable, will sign out against medical advice. [BA]   1953 Patient's family members are present now, patient has changed her mind and would like to be admitted to the hospital. [BA]      ED Course User Index  [BA] Trent Hernandez MD     Medical Decision Making  Amount and/or Complexity of Data Reviewed  Labs: ordered. Decision-making details documented in ED Course.  Radiology: ordered. Decision-making details documented in ED Course.    Risk  OTC drugs.  Prescription drug management.                ED Medication(s):  Medications   sodium chloride 0.9% bolus 1,000 mL 1,000 mL (0 mLs Intravenous Stopped 10/19/23 1708)   ondansetron injection 4 mg (4 mg Intravenous Given 10/19/23 1607)   hydrALAZINE tablet 50 mg (50 mg Oral Given 10/19/23 1914)   morphine injection 4 mg (4 mg Intravenous Given 10/19/23 1913)   iohexoL (OMNIPAQUE 9) oral solution 1,000 mL (1,000 mLs Oral Given 10/19/23 1808)   labetaloL injection 10 mg (10 mg Intravenous Given 10/19/23 2003)   insulin regular injection 6 Units 0.06 mL (6 Units Intravenous Given 10/19/23 2001)       Discharge Medication List as of 10/19/2023 11:48 PM        START taking these medications    Details   lactulose (CHRONULAC) 20 gram/30 mL Soln Take 30 mLs (20 g total) by mouth daily as needed (constipation)., Starting Thu 10/19/2023, Until Thu 10/26/2023 at 2359, Print      ondansetron (ZOFRAN-ODT) 4 MG TbDL Take 1 tablet (4 mg total) by mouth every 8 (eight) hours as needed., Starting Thu 10/19/2023, Until Mon 10/23/2023 at 2359, Print      pantoprazole (PROTONIX) 40 MG tablet Take 1 tablet (40 mg total) by mouth once daily.,  Starting Thu 10/19/2023, Until Sat 11/18/2023, Print              Follow-up Information       Your Primary Care Provider. Schedule an appointment as soon as possible for a visit in 1 day.    Why: For re-evaluation and further treatment             [facility] - Emergency Dept..    Specialty: Emergency Medicine  Why: As needed, If symptoms worsen  Contact information:  [id number] [facility] [address] [phone]                               Scribe Attestation:   Scribe #1: I performed the above scribed service and the documentation accurately describes the services I performed. I attest to the accuracy of the note.     Attending:   Physician Attestation Statement for Scribe #1: I, Trent Hernandez MD, personally performed the services described in this documentation, as scribed by Tarah Joyce, in my presence, and it is both accurate and complete.       Scribe Attestation:   Scribe #2: I performed the above scribed service and the documentation accurately describes the services I performed. I attest to the accuracy of the note.    Attending Attestation:           Physician Attestation for Scribe:    Physician Attestation Statement for Scribe #2: I, Darrell Breaux MD, reviewed documentation, as scribed by Mohsen Brown in my presence, and it is both accurate and complete. I also acknowledge and confirm the content of the note done by Scribe #1.           Clinical Impression       ICD-10-CM ICD-9-CM   1. Constipation, unspecified constipation type  K59.00 564.00   2. Nausea and vomiting, unspecified vomiting type  R11.2 787.01   3. Hyperglycemia due to diabetes mellitus  E11.65 250.02   4. Hypertension, unspecified type  I10 401.9   5. Hypertensive urgency  I16.0 401.9   6. Chronic kidney disease, unspecified CKD stage  N18.9 585.9       Disposition:   Disposition: Discharged  Condition: Stable        Darrell Breaux MD  10/20/23 0546

## 2023-10-19 NOTE — ED NOTES
Bed: [facility] 05  Expected date:   Expected time:   Means of arrival:   Comments:

## 2023-10-19 NOTE — FIRST PROVIDER EVALUATION
Medical screening examination initiated.  I have conducted a focused provider triage encounter, findings are as follows:    Brief history of present illness:  Abdominal pain, vomiting and constipation x3 days    Vitals:    10/19/23 1532   BP: (!) 184/104   BP Location: Right arm   Patient Position: Sitting   Pulse: 101   Resp: 18   Temp: 98.1 °F (36.7 °C)   TempSrc: Oral   SpO2: 99%   Weight: 65.5 kg (144 lb 4.7 oz)       Pertinent physical exam:  No acute distress, vital signs stable    Brief workup plan:  Workup    Preliminary workup initiated; this workup will be continued and followed by the physician or advanced practice provider that is assigned to the patient when roomed.

## 2023-10-19 NOTE — ED NOTES
Bed: 21  Expected date:   Expected time:   Means of arrival:   Comments:  florencio Danielson Brandon C., NP  10/19/23 1826

## 2023-10-19 NOTE — Clinical Note
Date: 10/19/2023  Patient: Lydia Danielson  Admitted: 10/19/2023  4:01 PM  Attending Provider: Trent Hernandez MD    Lydia Danielson or her authorized caregiver has made the decision for the patient to leave the emergency department against the adv ice of her attending physician. She or her authorized caregiver has been informed and understands the inherent risks, including death, disability, deterioration.  She or her authorized caregiver has decided to accept the responsibility for this decis ion. Lydia Danielson and all necessary parties have been advised that she may return for further evaluation or treatment. Her condition at time of discharge was stable.  Lydia Danielson had current vital signs as follows:  BP (!) 224/108   Pulse 8 4   Temp 98.1 °F (36.7 °C) (Oral)   Resp 19   Wt 65.5 kg (144 lb 4.7 oz)

## 2023-12-30 ENCOUNTER — HOSPITAL ENCOUNTER (EMERGENCY)
Facility: HOSPITAL | Age: 68
Discharge: HOME | End: 2023-12-30
Attending: EMERGENCY MEDICINE
Payer: MEDICARE

## 2023-12-30 VITALS
WEIGHT: 130.81 LBS | DIASTOLIC BLOOD PRESSURE: 101 MMHG | HEART RATE: 84 BPM | OXYGEN SATURATION: 100 % | SYSTOLIC BLOOD PRESSURE: 194 MMHG | RESPIRATION RATE: 16 BRPM | BODY MASS INDEX: 21.77 KG/M2 | TEMPERATURE: 98 F

## 2023-12-30 DIAGNOSIS — E11.10 DIABETIC KETOACIDOSIS WITHOUT COMA ASSOCIATED WITH TYPE 2 DIABETES MELLITUS: Primary | ICD-10-CM

## 2023-12-30 DIAGNOSIS — R10.84 GENERALIZED ABDOMINAL PAIN: ICD-10-CM

## 2023-12-30 LAB
ALBUMIN SERPL BCP-MCNC: 4.1 G/DL (ref 3.5–5.2)
ALP SERPL-CCNC: 79 U/L (ref 55–135)
ALT SERPL W/O P-5'-P-CCNC: 8 U/L (ref 10–44)
ANION GAP SERPL CALC-SCNC: 15 MMOL/L (ref 8–16)
ANION GAP SERPL CALC-SCNC: 19 MMOL/L (ref 8–16)
AST SERPL-CCNC: 13 U/L (ref 10–40)
BASOPHILS # BLD AUTO: 0.02 K/UL (ref 0–0.2)
BASOPHILS NFR BLD: 0.4 % (ref 0–1.9)
BILIRUB SERPL-MCNC: 1.2 MG/DL (ref 0.1–1)
BUN SERPL-MCNC: 14 MG/DL (ref 8–23)
BUN SERPL-MCNC: 16 MG/DL (ref 8–23)
CALCIUM SERPL-MCNC: 10.8 MG/DL (ref 8.7–10.5)
CALCIUM SERPL-MCNC: 9.1 MG/DL (ref 8.7–10.5)
CHLORIDE SERPL-SCNC: 102 MMOL/L (ref 95–110)
CHLORIDE SERPL-SCNC: 98 MMOL/L (ref 95–110)
CO2 SERPL-SCNC: 16 MMOL/L (ref 23–29)
CO2 SERPL-SCNC: 18 MMOL/L (ref 23–29)
CREAT SERPL-MCNC: 1 MG/DL (ref 0.5–1.4)
CREAT SERPL-MCNC: 1.6 MG/DL (ref 0.5–1.4)
DIFFERENTIAL METHOD BLD: NORMAL
EOSINOPHIL # BLD AUTO: 0.1 K/UL (ref 0–0.5)
EOSINOPHIL NFR BLD: 1.2 % (ref 0–8)
ERYTHROCYTE [DISTWIDTH] IN BLOOD BY AUTOMATED COUNT: 11.6 % (ref 11.5–14.5)
EST. GFR  (NO RACE VARIABLE): 35 ML/MIN/1.73 M^2
EST. GFR  (NO RACE VARIABLE): >60 ML/MIN/1.73 M^2
GLUCOSE SERPL-MCNC: 234 MG/DL (ref 70–110)
GLUCOSE SERPL-MCNC: 430 MG/DL (ref 70–110)
HCT VFR BLD AUTO: 42.2 % (ref 37–48.5)
HCV AB SERPL QL IA: NEGATIVE
HEP C VIRUS HOLD SPECIMEN: NORMAL
HGB BLD-MCNC: 15 G/DL (ref 12–16)
HIV 1+2 AB+HIV1 P24 AG SERPL QL IA: NEGATIVE
IMM GRANULOCYTES # BLD AUTO: 0.01 K/UL (ref 0–0.04)
IMM GRANULOCYTES NFR BLD AUTO: 0.2 % (ref 0–0.5)
LYMPHOCYTES # BLD AUTO: 1.9 K/UL (ref 1–4.8)
LYMPHOCYTES NFR BLD: 33 % (ref 18–48)
MCH RBC QN AUTO: 29.8 PG (ref 27–31)
MCHC RBC AUTO-ENTMCNC: 35.5 G/DL (ref 32–36)
MCV RBC AUTO: 84 FL (ref 82–98)
MONOCYTES # BLD AUTO: 0.4 K/UL (ref 0.3–1)
MONOCYTES NFR BLD: 7.1 % (ref 4–15)
NEUTROPHILS # BLD AUTO: 3.3 K/UL (ref 1.8–7.7)
NEUTROPHILS NFR BLD: 58.1 % (ref 38–73)
NRBC BLD-RTO: 0 /100 WBC
PLATELET # BLD AUTO: 381 K/UL (ref 150–450)
PMV BLD AUTO: 10.6 FL (ref 9.2–12.9)
POCT GLUCOSE: 329 MG/DL (ref 70–110)
POTASSIUM SERPL-SCNC: 3.7 MMOL/L (ref 3.5–5.1)
POTASSIUM SERPL-SCNC: 4.5 MMOL/L (ref 3.5–5.1)
PROT SERPL-MCNC: 8 G/DL (ref 6–8.4)
RBC # BLD AUTO: 5.04 M/UL (ref 4–5.4)
SODIUM SERPL-SCNC: 133 MMOL/L (ref 136–145)
SODIUM SERPL-SCNC: 135 MMOL/L (ref 136–145)
WBC # BLD AUTO: 5.61 K/UL (ref 3.9–12.7)

## 2023-12-30 PROCEDURE — 25500020 PHARM REV CODE 255: Performed by: EMERGENCY MEDICINE

## 2023-12-30 PROCEDURE — 80053 COMPREHEN METABOLIC PANEL: CPT | Performed by: EMERGENCY MEDICINE

## 2023-12-30 PROCEDURE — 80048 BASIC METABOLIC PNL TOTAL CA: CPT | Mod: XB | Performed by: EMERGENCY MEDICINE

## 2023-12-30 PROCEDURE — 99285 EMERGENCY DEPT VISIT HI MDM: CPT | Mod: 25

## 2023-12-30 PROCEDURE — 82962 GLUCOSE BLOOD TEST: CPT

## 2023-12-30 PROCEDURE — 96361 HYDRATE IV INFUSION ADD-ON: CPT

## 2023-12-30 PROCEDURE — 85025 COMPLETE CBC W/AUTO DIFF WBC: CPT | Performed by: EMERGENCY MEDICINE

## 2023-12-30 PROCEDURE — 96374 THER/PROPH/DIAG INJ IV PUSH: CPT | Mod: 59

## 2023-12-30 PROCEDURE — 87389 HIV-1 AG W/HIV-1&-2 AB AG IA: CPT | Performed by: EMERGENCY MEDICINE

## 2023-12-30 PROCEDURE — A9698 NON-RAD CONTRAST MATERIALNOC: HCPCS | Performed by: EMERGENCY MEDICINE

## 2023-12-30 PROCEDURE — 96375 TX/PRO/DX INJ NEW DRUG ADDON: CPT

## 2023-12-30 PROCEDURE — 86803 HEPATITIS C AB TEST: CPT | Performed by: EMERGENCY MEDICINE

## 2023-12-30 PROCEDURE — 63600175 PHARM REV CODE 636 W HCPCS: Performed by: EMERGENCY MEDICINE

## 2023-12-30 RX ORDER — MORPHINE SULFATE 4 MG/ML
4 INJECTION, SOLUTION INTRAMUSCULAR; INTRAVENOUS
Status: COMPLETED | OUTPATIENT
Start: 2023-12-30 | End: 2023-12-30

## 2023-12-30 RX ORDER — ONDANSETRON 2 MG/ML
4 INJECTION INTRAMUSCULAR; INTRAVENOUS
Status: COMPLETED | OUTPATIENT
Start: 2023-12-30 | End: 2023-12-30

## 2023-12-30 RX ORDER — LACTULOSE 10 G/15ML
10 SOLUTION ORAL; RECTAL 2 TIMES DAILY PRN
Qty: 210 ML | Refills: 0 | Status: SHIPPED | OUTPATIENT
Start: 2023-12-30 | End: 2024-01-06

## 2023-12-30 RX ADMIN — SODIUM CHLORIDE, POTASSIUM CHLORIDE, SODIUM LACTATE AND CALCIUM CHLORIDE 1000 ML: 600; 310; 30; 20 INJECTION, SOLUTION INTRAVENOUS at 10:12

## 2023-12-30 RX ADMIN — IOHEXOL 1000 ML: 9 SOLUTION ORAL at 01:12

## 2023-12-30 RX ADMIN — INSULIN HUMAN 6 UNITS: 100 INJECTION, SOLUTION PARENTERAL at 11:12

## 2023-12-30 RX ADMIN — SODIUM CHLORIDE, POTASSIUM CHLORIDE, SODIUM LACTATE AND CALCIUM CHLORIDE 1000 ML: 600; 310; 30; 20 INJECTION, SOLUTION INTRAVENOUS at 11:12

## 2023-12-30 RX ADMIN — MORPHINE SULFATE 4 MG: 4 INJECTION INTRAVENOUS at 10:12

## 2023-12-30 RX ADMIN — IOHEXOL 65 ML: 350 INJECTION, SOLUTION INTRAVENOUS at 01:12

## 2023-12-30 RX ADMIN — ONDANSETRON 4 MG: 2 INJECTION INTRAMUSCULAR; INTRAVENOUS at 10:12

## 2023-12-30 NOTE — ED NOTES
Pt instructed to take home BP meds and insulin. Okay to DC with current vitals per Niesha VAZQUEZ. Pt verbalizes instructions back to RN and states she will take meds when she arrives home. DM and HTN education given to pt.

## 2023-12-30 NOTE — ED PROVIDER NOTES
SCRIBE #1 NOTE: I, Pretty Collins, am scribing for, and in the presence of, Jarrell Scherer MD. I have scribed the entire note.       History     Chief Complaint   Patient presents with    Abdominal Pain     Left lower quadrant pain x 2 days, + nausea and vomiting, denies diarrhea, LBM 4 days ago, + constipation. Hx of constipation. Tried OTC  meds and enema without relief.     HPI  12/30/2023, 10:46 AM  History obtained from the patient    HPI:  Lydia Danielson is a 68 y.o. female with a PMH of HTN, CAD, DM, HLD, and GERD who presents to the [facility] emergency department for evaluation of LLQ abd pain which onset 3 days PTA. This is a recurrent problem. Associated symptoms include constipation, nausea, and vomiting. Pt reports last BM was more than 7 days PTA. Sxs were not mitigated by Miralax. Pt has taken 2 tablespoons of Miralax a few times over the past 2 days. Pt states she is currently nausea free. Pt denies a PMHx of diverticulitis. Pt notes last colonoscopy was about 10 yrs go. No other complaints or concerns.     Arrival mode: Personal vehicle        PCP: Jeferson, Provider    Review of patient's allergies indicates:  No Known Allergies   Past Medical History:   Diagnosis Date    Allergic rhinitis, cause unspecified     Anxiety     CAD (coronary artery disease)     S/P MI in 2002; followed by Dr. Blevins, cardiologist    Depression     Diabetes mellitus, type 2     External hemorrhoid     GERD (gastroesophageal reflux disease)     Hyperlipidemia     Hypertension     Keloid     Multinodular goiter     Neck arthritis 07/28/2016    NATASHA (obstructive sleep apnea)     Postmenopausal     No history of abnormal pap smear     Past Surgical History:   Procedure Laterality Date    APPENDECTOMY  2011    BREAST CYST EXCISION Left     over 20 yrs ago    CARDIAC CATHETERIZATION      COLONOSCOPY N/A 3/1/2017    Procedure: COLONOSCOPY;  Surgeon: Chase Moffett III, MD;  Location: [facility];  Service:  Endoscopy;  Laterality: N/A;    HYSTERECTOMY      in her 50's    Left breast lumpectomy      NO cancer    OOPHORECTOMY      TLH/BSO      Due to noncancerous reasons       Family History   Problem Relation Age of Onset    Arthritis Mother     Lupus Sister     Diabetes Brother     Hypertension Brother     Diabetes Paternal Aunt     Cancer Maternal Cousin     Breast cancer Maternal Cousin     Cancer Cousin     Heart disease Sister     Diabetes Sister     Hypertension Sister     Diabetes Sister     Hypertension Sister     Diabetes Brother     Hypertension Brother     Cancer Brother         Pancreatic and lung cancer (Smoker)    Hypertension Son     Stroke Neg Hx      Social History     Tobacco Use    Smoking status: Former     Current packs/day: 0.00     Average packs/day: 0.1 packs/day for 0.2 years     Types: Cigarettes     Start date: 1993     Quit date: 1993     Years since quittin.4    Smokeless tobacco: Never   Substance and Sexual Activity    Alcohol use: No     Alcohol/week: 0.0 standard drinks of alcohol    Drug use: No    Sexual activity: Not Currently     Partners: Male     Birth control/protection: Surgical, Post-menopausal      Review of Systems     Review of Systems   Constitutional: Negative.  Negative for fever.   HENT: Negative.     Eyes: Negative.    Respiratory: Negative.     Cardiovascular: Negative.    Gastrointestinal:  Positive for abdominal pain (LLQ), constipation, nausea and vomiting.   Endocrine: Negative.    Genitourinary: Negative.  Negative for dysuria.   Musculoskeletal: Negative.    Skin: Negative.    Allergic/Immunologic: Negative.    Neurological: Negative.    Hematological: Negative.    Psychiatric/Behavioral: Negative.     All other systems reviewed and are negative.       Physical Exam     Initial Vitals [23 1008]   BP Pulse Resp Temp SpO2   (!) 177/104 (!) 115 20 98.1 °F (36.7 °C) 98 %      MAP       --          Physical Exam  Nursing notes and vital signs  reviewed.  Constitutional: Patient is in moderate distress.   Head: Normocephalic. Atraumatic.   Eyes: Conjunctivae are not pale. No scleral icterus.   ENT: Mucous membranes moist.   Neck: Supple.   Cardiovascular: Regular rate. Regular rhythm.   Pulmonary: No respiratory distress.   Abdominal: Non-distended. LLQ tenderness.  Musculoskeletal: Moves all extremities. No obvious deformities. No edema.   Skin: Warm and dry.   Neurological:  Alert, awake, and appropriate. Normal speech. No acute lateralizing neurologic deficits appreciated.   Psychiatric: Normal affect.       ED Course   Procedures  Vitals:    12/30/23 1008 12/30/23 1045 12/30/23 1201 12/30/23 1358   BP: (!) 177/104  (!) 165/93 (!) 206/100   Pulse: (!) 115  86 82   Resp: 20 18 17 13   Temp: 98.1 °F (36.7 °C)      TempSrc: Oral      SpO2: 98%  100% 100%   Weight: 59.4 kg (130 lb 13.5 oz)       12/30/23 1503   BP: (!) 194/101   Pulse: 84   Resp: 16   Temp:    TempSrc:    SpO2: 100%   Weight:      Lab Results Interpreted as Abnormal:  Labs Reviewed   COMPREHENSIVE METABOLIC PANEL - Abnormal; Notable for the following components:       Result Value    Sodium 133 (*)     CO2 16 (*)     Glucose 430 (*)     Creatinine 1.6 (*)     Calcium 10.8 (*)     Total Bilirubin 1.2 (*)     ALT 8 (*)     eGFR 35 (*)     Anion Gap 19 (*)     All other components within normal limits    Narrative:     Release to patient->Immediate   BASIC METABOLIC PANEL - Abnormal; Notable for the following components:    Sodium 135 (*)     CO2 18 (*)     Glucose 234 (*)     All other components within normal limits   POCT GLUCOSE - Abnormal; Notable for the following components:    POCT Glucose 329 (*)     All other components within normal limits   HIV 1 / 2 ANTIBODY    Narrative:     Release to patient->Immediate   HEPATITIS C ANTIBODY    Narrative:     Release to patient->Immediate   HEP C VIRUS HOLD SPECIMEN    Narrative:     Release to patient->Immediate   CBC W/ AUTO DIFFERENTIAL     Narrative:     Release to patient->Immediate   POCT GLUCOSE MONITORING CONTINUOUS      All Lab Results:  Results for orders placed or performed during the hospital encounter of 12/30/23   HIV 1/2 Ag/Ab (4th Gen)   Result Value Ref Range    HIV 1/2 Ag/Ab Negative Negative   Hepatitis C Antibody   Result Value Ref Range    Hepatitis C Ab Negative Negative   HCV Virus Hold Specimen   Result Value Ref Range    HEP C Virus Hold Specimen Hold for HCV sendout    CBC auto differential   Result Value Ref Range    WBC 5.61 3.90 - 12.70 K/uL    RBC 5.04 4.00 - 5.40 M/uL    Hemoglobin 15.0 12.0 - 16.0 g/dL    Hematocrit 42.2 37.0 - 48.5 %    MCV 84 82 - 98 fL    MCH 29.8 27.0 - 31.0 pg    MCHC 35.5 32.0 - 36.0 g/dL    RDW 11.6 11.5 - 14.5 %    Platelets 381 150 - 450 K/uL    MPV 10.6 9.2 - 12.9 fL    Immature Granulocytes 0.2 0.0 - 0.5 %    Gran # (ANC) 3.3 1.8 - 7.7 K/uL    Immature Grans (Abs) 0.01 0.00 - 0.04 K/uL    Lymph # 1.9 1.0 - 4.8 K/uL    Mono # 0.4 0.3 - 1.0 K/uL    Eos # 0.1 0.0 - 0.5 K/uL    Baso # 0.02 0.00 - 0.20 K/uL    nRBC 0 0 /100 WBC    Gran % 58.1 38.0 - 73.0 %    Lymph % 33.0 18.0 - 48.0 %    Mono % 7.1 4.0 - 15.0 %    Eosinophil % 1.2 0.0 - 8.0 %    Basophil % 0.4 0.0 - 1.9 %    Differential Method Automated    Comprehensive metabolic panel   Result Value Ref Range    Sodium 133 (L) 136 - 145 mmol/L    Potassium 4.5 3.5 - 5.1 mmol/L    Chloride 98 95 - 110 mmol/L    CO2 16 (L) 23 - 29 mmol/L    Glucose 430 (H) 70 - 110 mg/dL    BUN 16 8 - 23 mg/dL    Creatinine 1.6 (H) 0.5 - 1.4 mg/dL    Calcium 10.8 (H) 8.7 - 10.5 mg/dL    Total Protein 8.0 6.0 - 8.4 g/dL    Albumin 4.1 3.5 - 5.2 g/dL    Total Bilirubin 1.2 (H) 0.1 - 1.0 mg/dL    Alkaline Phosphatase 79 55 - 135 U/L    AST 13 10 - 40 U/L    ALT 8 (L) 10 - 44 U/L    eGFR 35 (A) >60 mL/min/1.73 m^2    Anion Gap 19 (H) 8 - 16 mmol/L   Basic metabolic panel   Result Value Ref Range    Sodium 135 (L) 136 - 145 mmol/L    Potassium 3.7 3.5 - 5.1 mmol/L     Chloride 102 95 - 110 mmol/L    CO2 18 (L) 23 - 29 mmol/L    Glucose 234 (H) 70 - 110 mg/dL    BUN 14 8 - 23 mg/dL    Creatinine 1.0 0.5 - 1.4 mg/dL    Calcium 9.1 8.7 - 10.5 mg/dL    Anion Gap 15 8 - 16 mmol/L    eGFR >60 >60 mL/min/1.73 m^2   POCT glucose   Result Value Ref Range    POCT Glucose 329 (H) 70 - 110 mg/dL     Imaging Results              CT Abdomen Pelvis With IV Contrast Routine Oral Contrast (Final result)  Result time 12/30/23 13:54:20      Final result by Kojo Vera MD (12/30/23 13:54:20)                   Impression:      No acute abdominopelvic abnormality.      Electronically signed by: Kojo Vera  Date:    12/30/2023  Time:    13:54               Narrative:    EXAMINATION:  CT ABDOMEN PELVIS WITH IV CONTRAST    CLINICAL HISTORY:  LLQ abdominal pain;    TECHNIQUE:  Low dose axial images, sagittal and coronal reformations were obtained from the lung bases to the pubic symphysis following the IV administration of 65 mL of Omnipaque 350 and the oral administration of 1000 mL of Omnipaque 9.    All CT scans at this location are performed using dose optimization techniques including the following: Automated exposure control; adjustment of the mA and/or kv; use of iterative reconstruction technique.    COMPARISON:  CT dated 10/19/2023    FINDINGS:  Liver is normal in size, contour, and enhancement.  No suspicious focal lesion.  Portal venous system is patent.    Gallbladder and biliary tree are unremarkable.  No cholelithiasis or ductal dilatation.    Spleen is normal in size and enhancement.  No suspicious focal lesion.    Pancreas is normal in size, contour, and enhancement.  No focal lesion or ductal dilatation.    Adrenal glands are unremarkable.    Kidneys are normal in size and enhancement.  No suspicious focal lesion, urolithiasis, or hydroureteronephrosis.    Gastrointestinal tract is unremarkable.  No evidence of obstruction, mass lesion, or inflammation.    Urinary  bladder is unremarkable.    Mild scattered atherosclerosis.  Major vessels of the abdomen and pelvis are otherwise unremarkable.    No suspicious lymphadenopathy.    No significant ascites, pneumoperitoneum, or peritoneal implant.    No acute bony abnormality.  No aggressive lytic or blastic lesion.  Degenerative changes noted in the spine and pelvis.    Small calcified granuloma in the right middle lobe.  Visualized lower lungs are otherwise unremarkable.                                     ED Physician's independent review of the above imaging: agree with radiologist.        The emergency physician reviewed the vital signs and test results, which are outlined above.     ED Discussion     2:46 PM: Abd pain resolved after insulin and fluids. Sx likely due to early DKA, which has resolved after insulin and multiple liters of IV fluid. Large coke at bedside. Pt instructed to please decrease sugar intake and follow up with PCP this week. Patient's evaluation in the ED does not suggest any emergent or life-threatening medical conditions requiring immediate intervention beyond what was provided in the ED, and I believe patient is safe for discharge.  Regardless, an unremarkable evaluation in the ED does not preclude the development or presence of a serious or life-threatening condition. As such, patient was given return instructions for any change or worsening of symptoms.             ED Medication(s) Administered:  Medications   lactated ringers bolus 1,000 mL (0 mLs Intravenous Stopped 12/30/23 1144)   morphine injection 4 mg (4 mg Intravenous Given 12/30/23 1045)   ondansetron injection 4 mg (4 mg Intravenous Given 12/30/23 1045)   insulin regular injection 6 Units 0.06 mL (6 Units Intravenous Given 12/30/23 1150)   lactated ringers bolus 1,000 mL (0 mLs Intravenous Stopped 12/30/23 1341)   iohexoL (OMNIPAQUE 350) injection 65 mL (65 mLs Intravenous Given 12/30/23 1343)   iohexoL (OMNIPAQUE 9) oral solution 1,000 mL  "(1,000 mLs Oral Given 12/30/23 1344)       Prescription Management: I performed a review of the patient's current Rx medication list as input by nursing staff.    Patient's Medications   New Prescriptions    LACTULOSE (CHRONULAC) 10 GRAM/15 ML SOLUTION    Take 15 mLs (10 g total) by mouth 2 (two) times daily as needed (constipation).   Previous Medications    ASPIRIN (ECOTRIN) 81 MG EC TABLET    Take 81 mg by mouth once daily.    ATORVASTATIN (LIPITOR) 40 MG TABLET    Take 1 tablet (40 mg total) by mouth nightly.    BENAZEPRIL (LOTENSIN) 40 MG TABLET    TAKE 1 TABLET(40 MG) BY MOUTH EVERY DAY    BLOOD SUGAR DIAGNOSTIC (ONETOUCH ULTRA TEST) STRP    1 strip by Misc.(Non-Drug; Combo Route) route 2 (two) times daily.    BLOOD SUGAR DIAGNOSTIC STRP    To check BG 2 times daily, to use with insurance preferred meter    FLUTICASONE PROPIONATE (FLONASE) 50 MCG/ACTUATION NASAL SPRAY    2 Spray, Suspension Nasal Every day.  each nostril    GLIMEPIRIDE (AMARYL) 4 MG TABLET    Take 1 tablet (4 mg total) by mouth 2 (two) times daily.    HYDRALAZINE (APRESOLINE) 50 MG TABLET    Take 1 tablet (50 mg total) by mouth every 12 (twelve) hours.    INSULIN GLARGINE, TOUJEO, (TOUJEO SOLOSTAR U-300 INSULIN) 300 UNIT/ML (1.5 ML) INPN PEN    Inject 32 Units into the skin once daily. INJECT FROM 32 UNITS DAILY UP TO MAX OF 48 UNTIS DAILY AS DIRECTED    IPRATROPIUM (ATROVENT) 42 MCG (0.06 %) NASAL SPRAY    2 sprays once daily.    ISOSORBIDE DINITRATE (ISORDIL) 20 MG TABLET    Take 1 tablet (20 mg total) by mouth 2 (two) times daily.    LANCETS MISC    To check BG 2 times daily, to use with insurance preferred meter    PANTOPRAZOLE (PROTONIX) 40 MG TABLET    Take 1 tablet (40 mg total) by mouth once daily.    PEN NEEDLE, DIABETIC 32 GAUGE X 5/32" NDLE    Use daily with Lantus    SERTRALINE (ZOLOFT) 50 MG TABLET    Take 1 tablet (50 mg total) by mouth once daily. For anxiety    TRAMADOL (ULTRAM) 50 MG TABLET    Take 1 tablet (50 mg total) by " mouth every 12 (twelve) hours as needed for Pain (for breakthrough pain unrelieved by toradol).    TRUE METRIX GLUCOSE METER MISC    USE TO CK BLOOD GLUCOSE TWICE DAILY   Modified Medications    No medications on file   Discontinued Medications    No medications on file           Follow-up Information       your pcp. Schedule an appointment as soon as possible for a visit in 3 days.               [facility] - Emergency Dept..    Specialty: Emergency Medicine  Why: As needed, If symptoms worsen  Contact information:  [address] [facility] [address] [phone]                           Scribe Attestation:   Scribe #1: I performed the above scribed service and the documentation accurately describes the services I performed. I attest to the accuracy of the note.     Attending:   Physician Attestation Statement for Scribe #1: I, Jarrell Scherer MD, personally performed the services described in this documentation, as scribed by Pretty Collins, in my presence, and it is both accurate and complete. As with other dictation methods such as dictation software, small errors or inconsistencies may be overlooked due to the goal of spending more face-to-face time with patients.      Clinical Impression       ICD-10-CM ICD-9-CM   1. Diabetic ketoacidosis without coma associated with type 2 diabetes mellitus  E11.10 250.12   2. Generalized abdominal pain  R10.84 789.07      ED Disposition Condition    Discharge Stable               Jarrell Scherer MD  12/30/23 8864